# Patient Record
Sex: MALE | Race: WHITE | NOT HISPANIC OR LATINO | Employment: OTHER | URBAN - METROPOLITAN AREA
[De-identification: names, ages, dates, MRNs, and addresses within clinical notes are randomized per-mention and may not be internally consistent; named-entity substitution may affect disease eponyms.]

---

## 2023-02-20 LAB
ALANINE AMINOTRANSFERASE (SGPT) (U/L) IN SER/PLAS: 11 U/L (ref 10–52)
ALBUMIN (G/DL) IN SER/PLAS: 4.4 G/DL (ref 3.4–5)
ALKALINE PHOSPHATASE (U/L) IN SER/PLAS: 91 U/L (ref 33–136)
ANION GAP IN SER/PLAS: 15 MMOL/L (ref 10–20)
ASPARTATE AMINOTRANSFERASE (SGOT) (U/L) IN SER/PLAS: 14 U/L (ref 9–39)
BILIRUBIN TOTAL (MG/DL) IN SER/PLAS: 0.6 MG/DL (ref 0–1.2)
CALCIUM (MG/DL) IN SER/PLAS: 9.6 MG/DL (ref 8.6–10.3)
CARBON DIOXIDE, TOTAL (MMOL/L) IN SER/PLAS: 30 MMOL/L (ref 21–32)
CHLORIDE (MMOL/L) IN SER/PLAS: 100 MMOL/L (ref 98–107)
CHOLESTEROL (MG/DL) IN SER/PLAS: 192 MG/DL (ref 0–199)
CHOLESTEROL IN HDL (MG/DL) IN SER/PLAS: 60.2 MG/DL
CHOLESTEROL/HDL RATIO: 3.2
CREATININE (MG/DL) IN SER/PLAS: 0.94 MG/DL (ref 0.5–1.3)
ERYTHROCYTE DISTRIBUTION WIDTH (RATIO) BY AUTOMATED COUNT: 14 % (ref 11.5–14.5)
ERYTHROCYTE MEAN CORPUSCULAR HEMOGLOBIN CONCENTRATION (G/DL) BY AUTOMATED: 31.8 G/DL (ref 32–36)
ERYTHROCYTE MEAN CORPUSCULAR VOLUME (FL) BY AUTOMATED COUNT: 94 FL (ref 80–100)
ERYTHROCYTES (10*6/UL) IN BLOOD BY AUTOMATED COUNT: 4.5 X10E12/L (ref 4.5–5.9)
GFR MALE: 82 ML/MIN/1.73M2
GLUCOSE (MG/DL) IN SER/PLAS: 100 MG/DL (ref 74–99)
HEMATOCRIT (%) IN BLOOD BY AUTOMATED COUNT: 42.1 % (ref 41–52)
HEMOGLOBIN (G/DL) IN BLOOD: 13.4 G/DL (ref 13.5–17.5)
LDL: 110 MG/DL (ref 0–99)
LEUKOCYTES (10*3/UL) IN BLOOD BY AUTOMATED COUNT: 6.2 X10E9/L (ref 4.4–11.3)
PLATELETS (10*3/UL) IN BLOOD AUTOMATED COUNT: 244 X10E9/L (ref 150–450)
POTASSIUM (MMOL/L) IN SER/PLAS: 3.8 MMOL/L (ref 3.5–5.3)
PROSTATE SPECIFIC AG (NG/ML) IN SER/PLAS: 0.06 NG/ML (ref 0–4)
PROTEIN TOTAL: 7.5 G/DL (ref 6.4–8.2)
SODIUM (MMOL/L) IN SER/PLAS: 141 MMOL/L (ref 136–145)
TRIGLYCERIDE (MG/DL) IN SER/PLAS: 107 MG/DL (ref 0–149)
UREA NITROGEN (MG/DL) IN SER/PLAS: 14 MG/DL (ref 6–23)
VLDL: 21 MG/DL (ref 0–40)

## 2023-02-21 LAB
ESTIMATED AVERAGE GLUCOSE FOR HBA1C: 114 MG/DL
HEMOGLOBIN A1C/HEMOGLOBIN TOTAL IN BLOOD: 5.6 %

## 2023-03-29 LAB
ALBUMIN (G/DL) IN SER/PLAS: 3.8 G/DL (ref 3.4–5)
ANION GAP IN SER/PLAS: 9 MMOL/L (ref 10–20)
CALCIUM (MG/DL) IN SER/PLAS: 9 MG/DL (ref 8.6–10.3)
CARBON DIOXIDE, TOTAL (MMOL/L) IN SER/PLAS: 31 MMOL/L (ref 21–32)
CHLORIDE (MMOL/L) IN SER/PLAS: 106 MMOL/L (ref 98–107)
CREATININE (MG/DL) IN SER/PLAS: 1.06 MG/DL (ref 0.5–1.3)
GFR MALE: 71 ML/MIN/1.73M2
GLUCOSE (MG/DL) IN SER/PLAS: 85 MG/DL (ref 74–99)
PHOSPHATE (MG/DL) IN SER/PLAS: 3.8 MG/DL (ref 2.5–4.9)
POTASSIUM (MMOL/L) IN SER/PLAS: 4.3 MMOL/L (ref 3.5–5.3)
SODIUM (MMOL/L) IN SER/PLAS: 142 MMOL/L (ref 136–145)
UREA NITROGEN (MG/DL) IN SER/PLAS: 15 MG/DL (ref 6–23)

## 2023-04-12 ENCOUNTER — TELEPHONE (OUTPATIENT)
Dept: PRIMARY CARE | Facility: CLINIC | Age: 78
End: 2023-04-12
Payer: MEDICARE

## 2023-04-12 PROBLEM — R91.1 LUNG NODULE SEEN ON IMAGING STUDY: Status: ACTIVE | Noted: 2023-04-12

## 2023-04-12 PROBLEM — J44.9 COLD (CHRONIC OBSTRUCTIVE LUNG DISEASE) (MULTI): Status: ACTIVE | Noted: 2023-04-12

## 2023-04-12 PROBLEM — E11.9 NEW ONSET TYPE 2 DIABETES MELLITUS (MULTI): Status: ACTIVE | Noted: 2023-04-12

## 2023-04-12 PROBLEM — N40.0 BPH (BENIGN PROSTATIC HYPERPLASIA): Status: ACTIVE | Noted: 2023-04-12

## 2023-04-12 PROBLEM — E78.5 HYPERLIPIDEMIA: Status: ACTIVE | Noted: 2023-04-12

## 2023-04-12 PROBLEM — C61 PROSTATE CANCER (MULTI): Status: ACTIVE | Noted: 2023-04-12

## 2023-04-12 PROBLEM — I65.23 ATHEROSCLEROSIS OF BOTH CAROTID ARTERIES: Status: ACTIVE | Noted: 2023-04-12

## 2023-04-12 PROBLEM — I63.9 CEREBROVASCULAR ACCIDENT (CVA) (MULTI): Status: ACTIVE | Noted: 2023-04-12

## 2023-04-12 PROBLEM — R09.89 BRUIT OF RIGHT CAROTID ARTERY: Status: ACTIVE | Noted: 2023-04-12

## 2023-04-12 RX ORDER — MULTIVITAMIN
1 TABLET ORAL DAILY
COMMUNITY

## 2023-04-12 RX ORDER — ATORVASTATIN CALCIUM 20 MG/1
1 TABLET, FILM COATED ORAL DAILY
COMMUNITY
End: 2023-09-21 | Stop reason: SDUPTHER

## 2023-04-12 RX ORDER — ATORVASTATIN CALCIUM 10 MG/1
1 TABLET, FILM COATED ORAL DAILY
COMMUNITY
Start: 2021-04-13

## 2023-04-12 RX ORDER — FLUTICASONE PROPIONATE AND SALMETEROL 250; 50 UG/1; UG/1
POWDER RESPIRATORY (INHALATION)
COMMUNITY
Start: 2022-11-07 | End: 2023-09-21 | Stop reason: ALTCHOICE

## 2023-04-12 RX ORDER — FLUTICASONE FUROATE, UMECLIDINIUM BROMIDE AND VILANTEROL TRIFENATATE 100; 62.5; 25 UG/1; UG/1; UG/1
POWDER RESPIRATORY (INHALATION)
COMMUNITY
Start: 2023-02-20

## 2023-04-12 RX ORDER — CLOPIDOGREL BISULFATE 75 MG/1
1 TABLET ORAL DAILY
COMMUNITY
End: 2023-08-28 | Stop reason: SDUPTHER

## 2023-04-12 RX ORDER — ALBUTEROL SULFATE 0.83 MG/ML
3 SOLUTION RESPIRATORY (INHALATION) EVERY 6 HOURS
COMMUNITY

## 2023-04-12 RX ORDER — LISINOPRIL 40 MG/1
1 TABLET ORAL DAILY
COMMUNITY
Start: 2023-03-29

## 2023-04-12 RX ORDER — OXYBUTYNIN CHLORIDE 5 MG/1
TABLET ORAL 2 TIMES DAILY
COMMUNITY
End: 2023-09-21 | Stop reason: ALTCHOICE

## 2023-04-12 RX ORDER — PREDNISONE 20 MG/1
2 TABLET ORAL DAILY
COMMUNITY
Start: 2022-11-16 | End: 2023-09-21 | Stop reason: ALTCHOICE

## 2023-04-12 RX ORDER — ALBUTEROL SULFATE 90 UG/1
AEROSOL, METERED RESPIRATORY (INHALATION) EVERY 6 HOURS PRN
COMMUNITY
Start: 2022-11-22

## 2023-04-12 RX ORDER — ASPIRIN 81 MG/1
1 TABLET ORAL DAILY
COMMUNITY
Start: 2021-09-15

## 2023-04-12 RX ORDER — TIOTROPIUM BROMIDE INHALATION SPRAY 1.56 UG/1
SPRAY, METERED RESPIRATORY (INHALATION)
COMMUNITY
Start: 2021-01-13 | End: 2023-09-21 | Stop reason: ALTCHOICE

## 2023-04-12 NOTE — TELEPHONE ENCOUNTER
PATIENT STOPPED IN OFFICE AND DROPPED OFF PAPERWORK TO BE FILLED OUT TO HAVE HIS MAILBOX MOVED    PAPERWORK PLACED IN [facility] MAILBOX 04/12/2023

## 2023-04-19 ENCOUNTER — TELEPHONE (OUTPATIENT)
Dept: PRIMARY CARE | Facility: CLINIC | Age: 78
End: 2023-04-19
Payer: MEDICARE

## 2023-04-19 NOTE — TELEPHONE ENCOUNTER
PT STOPPED BY TO SEE IF PAPERWORK FOR MOVING HIS MAILBOX IS READY. I RELAYED THAT YOU WOULD FILL OUT WHEN YOU ARE ABLE. HE STATES THAT HE IS NOT ABLE TO CROSS THE ROAD TO GET HIS MAIL. I TOLD HIM WE WOULD CALL WHEN IT IS READY FOR HIM TO

## 2023-04-24 NOTE — TELEPHONE ENCOUNTER
"The form dropped off by Hai Laboy is from the post office and is signed by him (there are 2 of the same form, only one is signed by pt). It is in the pile in the back with the clip marked \"other\".  "

## 2023-04-28 ENCOUNTER — TELEPHONE (OUTPATIENT)
Dept: PRIMARY CARE | Facility: CLINIC | Age: 78
End: 2023-04-28
Payer: MEDICARE

## 2023-08-03 ENCOUNTER — PATIENT OUTREACH (OUTPATIENT)
Dept: CARE COORDINATION | Facility: CLINIC | Age: 78
End: 2023-08-03
Payer: MEDICARE

## 2023-08-03 ENCOUNTER — DOCUMENTATION (OUTPATIENT)
Dept: CARE COORDINATION | Facility: CLINIC | Age: 78
End: 2023-08-03
Payer: MEDICARE

## 2023-08-03 NOTE — PROGRESS NOTES
Discharge Facility: [facility]  Discharge Diagnosis: Fall, generalized weakness with impaired ambulation  Admission Date: 6/23/23  Discharge Date: 8/1/23    PCP Appointment Date: No appts, message to clinical pool  Specialist Appointment Date: n/a  Hospital Encounter and Summary: not available at this time  Unsuccessful attempts to reach patient x2

## 2023-08-15 ENCOUNTER — TELEPHONE (OUTPATIENT)
Dept: PRIMARY CARE | Facility: CLINIC | Age: 78
End: 2023-08-15
Payer: MEDICARE

## 2023-08-15 DIAGNOSIS — I63.9 CEREBROVASCULAR ACCIDENT (CVA), UNSPECIFIED MECHANISM (MULTI): Primary | ICD-10-CM

## 2023-08-15 NOTE — TELEPHONE ENCOUNTER
Pt just got out of a nursing home and was put on Clopidogrel 75mg. This is only showing as history for the clopidogrel, even in [facility]. Pt is requesting this to [facility] [place]. Pt is also requesting Gemtesa which was previously rx'd by Dr. Lopez in May. Pt is wanting Dr. MAIER to fill this as it would be more convenient for him.

## 2023-08-25 ENCOUNTER — PATIENT OUTREACH (OUTPATIENT)
Dept: CARE COORDINATION | Facility: CLINIC | Age: 78
End: 2023-08-25
Payer: MEDICARE

## 2023-08-25 NOTE — PROGRESS NOTES
Unable to reach patient for call back after patient's hospitalization. Patient did not have PCP appt within 14 days of discharge.  LVM with call back number for patient to call if needed   If no voicemail available call attempts x 2 were made to contact the patient to assist with any questions or concerns patient may have.

## 2023-08-28 RX ORDER — CLOPIDOGREL BISULFATE 75 MG/1
75 TABLET ORAL DAILY
Qty: 30 TABLET | Refills: 6 | Status: SHIPPED | OUTPATIENT
Start: 2023-08-28 | End: 2023-10-06

## 2023-09-21 ENCOUNTER — OFFICE VISIT (OUTPATIENT)
Dept: PRIMARY CARE | Facility: CLINIC | Age: 78
End: 2023-09-21
Payer: MEDICARE

## 2023-09-21 VITALS
OXYGEN SATURATION: 96 % | HEART RATE: 96 BPM | BODY MASS INDEX: 19.23 KG/M2 | RESPIRATION RATE: 16 BRPM | SYSTOLIC BLOOD PRESSURE: 138 MMHG | DIASTOLIC BLOOD PRESSURE: 84 MMHG | TEMPERATURE: 96.8 F | WEIGHT: 134 LBS

## 2023-09-21 DIAGNOSIS — E11.9 NEW ONSET TYPE 2 DIABETES MELLITUS (MULTI): ICD-10-CM

## 2023-09-21 DIAGNOSIS — I65.23 ATHEROSCLEROSIS OF BOTH CAROTID ARTERIES: ICD-10-CM

## 2023-09-21 DIAGNOSIS — R26.2 AMBULATORY DYSFUNCTION: ICD-10-CM

## 2023-09-21 DIAGNOSIS — E78.5 HYPERLIPIDEMIA, UNSPECIFIED HYPERLIPIDEMIA TYPE: ICD-10-CM

## 2023-09-21 DIAGNOSIS — I63.039 CEREBROVASCULAR ACCIDENT (CVA) DUE TO THROMBOSIS OF CAROTID ARTERY, UNSPECIFIED BLOOD VESSEL LATERALITY (MULTI): ICD-10-CM

## 2023-09-21 DIAGNOSIS — C61 PROSTATE CANCER (MULTI): ICD-10-CM

## 2023-09-21 DIAGNOSIS — Z00.00 ROUTINE GENERAL MEDICAL EXAMINATION AT HEALTH CARE FACILITY: Primary | ICD-10-CM

## 2023-09-21 DIAGNOSIS — J44.9 CHRONIC OBSTRUCTIVE PULMONARY DISEASE, UNSPECIFIED COPD TYPE (MULTI): ICD-10-CM

## 2023-09-21 DIAGNOSIS — R91.1 LUNG NODULE SEEN ON IMAGING STUDY: ICD-10-CM

## 2023-09-21 PROBLEM — R39.15 URINARY URGENCY: Status: ACTIVE | Noted: 2023-09-21

## 2023-09-21 PROCEDURE — 99215 OFFICE O/P EST HI 40 MIN: CPT | Performed by: INTERNAL MEDICINE

## 2023-09-21 PROCEDURE — 1126F AMNT PAIN NOTED NONE PRSNT: CPT | Performed by: INTERNAL MEDICINE

## 2023-09-21 PROCEDURE — 1036F TOBACCO NON-USER: CPT | Performed by: INTERNAL MEDICINE

## 2023-09-21 PROCEDURE — G0439 PPPS, SUBSEQ VISIT: HCPCS | Performed by: INTERNAL MEDICINE

## 2023-09-21 PROCEDURE — 1160F RVW MEDS BY RX/DR IN RCRD: CPT | Performed by: INTERNAL MEDICINE

## 2023-09-21 PROCEDURE — 1170F FXNL STATUS ASSESSED: CPT | Performed by: INTERNAL MEDICINE

## 2023-09-21 PROCEDURE — 1159F MED LIST DOCD IN RCRD: CPT | Performed by: INTERNAL MEDICINE

## 2023-09-21 RX ORDER — VIBEGRON 75 MG/1
1 TABLET, FILM COATED ORAL DAILY
Qty: 90 TABLET | Refills: 3 | Status: SHIPPED | OUTPATIENT
Start: 2023-09-21

## 2023-09-21 RX ORDER — VIBEGRON 75 MG/1
1 TABLET, FILM COATED ORAL DAILY
COMMUNITY
Start: 2023-05-30 | End: 2023-09-21 | Stop reason: SDUPTHER

## 2023-09-21 SDOH — ECONOMIC STABILITY: FOOD INSECURITY: WITHIN THE PAST 12 MONTHS, THE FOOD YOU BOUGHT JUST DIDN'T LAST AND YOU DIDN'T HAVE MONEY TO GET MORE.: NEVER TRUE

## 2023-09-21 SDOH — ECONOMIC STABILITY: FOOD INSECURITY: WITHIN THE PAST 12 MONTHS, YOU WORRIED THAT YOUR FOOD WOULD RUN OUT BEFORE YOU GOT MONEY TO BUY MORE.: NEVER TRUE

## 2023-09-21 ASSESSMENT — LIFESTYLE VARIABLES
SKIP TO QUESTIONS 9-10: 1
HOW MANY STANDARD DRINKS CONTAINING ALCOHOL DO YOU HAVE ON A TYPICAL DAY: PATIENT DOES NOT DRINK
HOW OFTEN DO YOU HAVE A DRINK CONTAINING ALCOHOL: NEVER
HOW OFTEN DO YOU HAVE SIX OR MORE DRINKS ON ONE OCCASION: NEVER
AUDIT-C TOTAL SCORE: 0

## 2023-09-21 ASSESSMENT — ACTIVITIES OF DAILY LIVING (ADL)
BATHING: NEEDS ASSISTANCE
MANAGING_FINANCES: NEEDS ASSISTANCE
GROCERY_SHOPPING: NEEDS ASSISTANCE
TAKING_MEDICATION: NEEDS ASSISTANCE
DOING_HOUSEWORK: NEEDS ASSISTANCE
DRESSING: NEEDS ASSISTANCE

## 2023-09-21 ASSESSMENT — PAIN SCALES - GENERAL: PAINLEVEL: 0-NO PAIN

## 2023-09-21 ASSESSMENT — PATIENT HEALTH QUESTIONNAIRE - PHQ9
1. LITTLE INTEREST OR PLEASURE IN DOING THINGS: NOT AT ALL
SUM OF ALL RESPONSES TO PHQ9 QUESTIONS 1 & 2: 0
2. FEELING DOWN, DEPRESSED OR HOPELESS: NOT AT ALL

## 2023-09-21 ASSESSMENT — ENCOUNTER SYMPTOMS
OCCASIONAL FEELINGS OF UNSTEADINESS: 1
DEPRESSION: 0
LOSS OF SENSATION IN FEET: 0

## 2023-09-21 NOTE — ASSESSMENT & PLAN NOTE
Patient has poor insight into illnesses, home health ordered, a face to face exam completed today, patient does not cook his own food, he is not able to dispense his own med therapy. The patient requires home health to perform activities of daily living.

## 2023-09-21 NOTE — PROGRESS NOTES
"Subjective   Reason for Visit: Hai Laboy is an 78 y.o. male here for a Medicare Wellness visit and follow up exam     Past Medical, Surgical, and Family History reviewed and updated in chart.    Reviewed all medications by prescribing practitioner or clinical pharmacist (such as prescriptions, OTCs, herbal therapies and supplements) and documented in the medical record.    HPI    Mechanical fall s/p hospital admission: He was discharged to a nursing facility. He has been home since August 1st.     CVA/Carotid stenosis: He is followed by Vascular Surgery, Dr. Sal. He is on ASA. No reports of declining mentation or neurologic symptoms. CTA of the neck showing \"Extensive atherosclerotic disease from the aortic arch to the carotid  siphons\", patient is not aware of follow up. He apparently does not have a follow up scheduled    COPD: He is currently on Trelegy-Ellipta and utilizes an Albuterol inhaler/nebulizer treatments per as needed. Trelegy was begun on prior visit. He feels that the therapy has helped. He is not followed by Pulmonology.     DM type-II: He is not currently on medical therapy. No recent use of insulin. He has not had recent labs completed    HLD: He is on Atorvastatin 10 mg daily. His meals are controlled by others including friends. He is unable to make his own meals. He is due for lab testing    Black stools: No recent reports or issues.    Urinary Urgency/History of Prostate CA: He is followed by Dr. Lopez. He is on Gemteza 75 mg. No urinary complaints.    Spiculated Lung Nodule: He has not yet followed up with Pulmonology. Previous history of work in [employer].    Visual disturbances: Cataract surgery. No new issues reported.      He awaiting for home health care, patient apparently had home health care after SNF admission for about 2 weeks. The nursing service could not get patient help. Patient currently has a friend helping him at home only    Patient Care Team:  Damian SAINI" MD Yoshi as PCP - General  Damian Belle MD as PCP - [employer] PCP     Review of Systems    Patient gets some shortness of breath with exertion. Otherwise he denies any further current complaints, except as  noted in the HPI    Objective   Vitals:  /84 (BP Location: Left arm, Patient Position: Sitting, BP Cuff Size: Adult)   Pulse 96   Temp 36 °C (96.8 °F) (Temporal)   Resp 16   Wt 60.8 kg (134 lb)   SpO2 96%   BMI 19.23 kg/m²       Physical Exam    Constitutional   General appearance: Abnormal.  well developed, well nourished, within normal limits of ideal weight , walks with a walker, patient seems to have limited insight of his illnesses. He is accompanied by friend who helps him  Eyes   Inspection of eyes: Sclera and conjunctiva were normal.   Ears, Nose, Mouth, and Throat   Ears: Auricles: Normal.   Pulmonary   Respiratory assessment: No respiratory distress, normal respiratory rhythm and effort.    Auscultation of lungs: Abnormal.  Auscultation of the lungs revealed decreased breath sounds diffusely, but no expiratory wheezing and no inspiratory wheezing. no rales or crackles were heard bilaterally. no rhonchi. no friction rub. no wheezing. markedly diminished breath sounds throughtout all fields. no bronchial breath sounds.   Cardiovascular   Auscultation of heart: Apical pulse normal, heart rate and rhythm normal, normal S1 and S2, no murmurs and no pericardial rub. The heart rate was normal. The rhythm was regular. Heart sounds: the heart sounds were distant, but normal S1 and normal S2. no murmurs were heard.    Exam for edema: No peripheral edema. bilateral  carotid bruits are noted.   Lymphatic   Palpation of lymph nodes in neck: No cervical lymphadenopathy.   Neurologic   Cranial nerves: Patient has a very slight left facial droop only,  not wearing a mask , he does not manage meds at home, he walks with a walker  Psychiatric   Orientation: Oriented to person,  place, and time.    Mood and affect: Normal. He seems to have poor insight into current multiple medical issues  Assessment/Plan   Problem List Items Addressed This Visit       Atherosclerosis of both carotid arteries    Current Assessment & Plan     Refer to vascular surgery for follow up          Relevant Orders    CBC and Auto Differential    Comprehensive metabolic panel    Referral to Vascular Surgery    Referral to Home Care    Cerebrovascular accident (CVA) (CMS/[facility])    Current Assessment & Plan     Patient has poor insight into illnesses, home health ordered, a face to face exam completed today, patient does not cook his own food, he is not able to dispense his own med therapy. The patient requires home health to perform activities of daily living.          Relevant Orders    CBC and Auto Differential    Comprehensive metabolic panel    Referral to Vascular Surgery    Referral to Home Care    COLD (chronic obstructive lung disease) (CMS/[facility])    Current Assessment & Plan     Continue the Trelegy, refer to pulmonary med for follow up         Relevant Orders    CBC and Auto Differential    Comprehensive metabolic panel    Referral to Pulmonology    Referral to Home Care    Hyperlipidemia    Current Assessment & Plan     Check lipids, continue the atorvastatin therapy         Relevant Orders    CBC and Auto Differential    Comprehensive metabolic panel    Lipid panel    Referral to Home Care    Lung nodule seen on imaging study    Current Assessment & Plan     Check a follow up CT of the chest         Relevant Orders    CBC and Auto Differential    Comprehensive metabolic panel    Referral to Pulmonology    CT chest wo IV contrast    Referral to Home Care    New onset type 2 diabetes mellitus (CMS/[facility])    Current Assessment & Plan     Check labs, patient takes no meds for DM at present time         Relevant Orders    CBC and Auto Differential    Comprehensive metabolic panel    Hemoglobin A1c    Albumin, urine,  random    Referral to Home Care    Prostate cancer (CMS/[facility])    Current Assessment & Plan     He should follow up regularly with urology         Relevant Medications    Gemtesa 75 mg tablet    Other Relevant Orders    CBC and Auto Differential    Comprehensive metabolic panel    Referral to Urology    Referral to Home Care    Routine general medical examination at health care facility - Primary    Relevant Orders    CBC and Auto Differential    Comprehensive metabolic panel    Referral to Home Care     Other Visit Diagnoses       Ambulatory dysfunction        Relevant Orders    Referral to Home Care          Patient refused all vaccines today, Medicare wellness exam was completed, patient was referred to home health care for assessment. He needs help with AOLs at home

## 2023-09-25 ENCOUNTER — TELEPHONE (OUTPATIENT)
Dept: PRIMARY CARE | Facility: CLINIC | Age: 78
End: 2023-09-25
Payer: MEDICARE

## 2023-09-25 NOTE — TELEPHONE ENCOUNTER
Bennett Gutierrez with  Homecare called stating they do not have a home health aid in his area.      They are able to provide a skilled nurse and a     Call back number [phone]

## 2023-10-01 DIAGNOSIS — I63.9 CEREBROVASCULAR ACCIDENT (CVA), UNSPECIFIED MECHANISM (MULTI): ICD-10-CM

## 2023-10-06 RX ORDER — CLOPIDOGREL BISULFATE 75 MG/1
75 TABLET ORAL DAILY
Qty: 30 TABLET | Refills: 6 | Status: SHIPPED | OUTPATIENT
Start: 2023-10-06

## 2023-10-16 ENCOUNTER — APPOINTMENT (OUTPATIENT)
Dept: RADIOLOGY | Facility: HOSPITAL | Age: 78
DRG: 871 | End: 2023-10-16
Payer: MEDICARE

## 2023-10-16 ENCOUNTER — HOSPITAL ENCOUNTER (INPATIENT)
Facility: HOSPITAL | Age: 78
LOS: 4 days | Discharge: NURSING, REHAB OR LONG-TERM CARE | DRG: 871 | End: 2023-10-20
Attending: EMERGENCY MEDICINE | Admitting: INTERNAL MEDICINE
Payer: MEDICARE

## 2023-10-16 DIAGNOSIS — R41.82 ALTERED MENTAL STATUS, UNSPECIFIED ALTERED MENTAL STATUS TYPE: ICD-10-CM

## 2023-10-16 DIAGNOSIS — I63.9 CEREBROVASCULAR ACCIDENT (CVA), UNSPECIFIED MECHANISM (MULTI): ICD-10-CM

## 2023-10-16 DIAGNOSIS — R93.0 ABNORMAL CT SCAN OF HEAD: ICD-10-CM

## 2023-10-16 DIAGNOSIS — J96.01 ACUTE HYPOXIC RESPIRATORY FAILURE (MULTI): ICD-10-CM

## 2023-10-16 DIAGNOSIS — G93.40 ENCEPHALOPATHY: ICD-10-CM

## 2023-10-16 DIAGNOSIS — G93.89 CEREBRAL VENTRICULOMEGALY: Primary | ICD-10-CM

## 2023-10-16 DIAGNOSIS — I65.23 ATHEROSCLEROSIS OF BOTH CAROTID ARTERIES: ICD-10-CM

## 2023-10-16 DIAGNOSIS — I65.01 OCCLUSION OF RIGHT VERTEBRAL ARTERY: ICD-10-CM

## 2023-10-16 DIAGNOSIS — J18.9 ACUTE PNEUMONIA: ICD-10-CM

## 2023-10-16 LAB
ALBUMIN SERPL BCP-MCNC: 4.6 G/DL (ref 3.4–5)
ALP SERPL-CCNC: 87 U/L (ref 33–136)
ALT SERPL W P-5'-P-CCNC: 6 U/L (ref 10–52)
ANION GAP BLDV CALCULATED.4IONS-SCNC: 12 MMOL/L (ref 10–25)
ANION GAP SERPL CALC-SCNC: 20 MMOL/L (ref 10–20)
AST SERPL W P-5'-P-CCNC: 21 U/L (ref 9–39)
BASE EXCESS BLDV CALC-SCNC: 1.4 MMOL/L (ref -2–3)
BASOPHILS # BLD AUTO: 0.07 X10*3/UL (ref 0–0.1)
BASOPHILS NFR BLD AUTO: 0.4 %
BILIRUB SERPL-MCNC: 0.9 MG/DL (ref 0–1.2)
BODY TEMPERATURE: 37 DEGREES CELSIUS
BUN SERPL-MCNC: 39 MG/DL (ref 6–23)
CA-I BLDV-SCNC: 1.16 MMOL/L (ref 1.1–1.33)
CALCIUM SERPL-MCNC: 10 MG/DL (ref 8.6–10.3)
CARDIAC TROPONIN I PNL SERPL HS: 21 NG/L (ref 0–20)
CHLORIDE BLDV-SCNC: 102 MMOL/L (ref 98–107)
CHLORIDE SERPL-SCNC: 103 MMOL/L (ref 98–107)
CO2 SERPL-SCNC: 20 MMOL/L (ref 21–32)
CREAT SERPL-MCNC: 1.29 MG/DL (ref 0.5–1.3)
D DIMER PPP FEU-MCNC: 1935 NG/ML FEU
EOSINOPHIL # BLD AUTO: 0.01 X10*3/UL (ref 0–0.4)
EOSINOPHIL NFR BLD AUTO: 0.1 %
ERYTHROCYTE [DISTWIDTH] IN BLOOD BY AUTOMATED COUNT: 14.3 % (ref 11.5–14.5)
FLUAV RNA RESP QL NAA+PROBE: NOT DETECTED
FLUBV RNA RESP QL NAA+PROBE: NOT DETECTED
GFR SERPL CREATININE-BSD FRML MDRD: 56 ML/MIN/1.73M*2
GLUCOSE BLDV-MCNC: 148 MG/DL (ref 74–99)
GLUCOSE SERPL-MCNC: 141 MG/DL (ref 74–99)
HCO3 BLDV-SCNC: 27.2 MMOL/L (ref 22–26)
HCT VFR BLD AUTO: 50.1 % (ref 41–52)
HCT VFR BLD EST: 47 % (ref 41–52)
HGB BLD-MCNC: 16.9 G/DL (ref 13.5–17.5)
HGB BLDV-MCNC: 15.5 G/DL (ref 13.5–17.5)
IMM GRANULOCYTES # BLD AUTO: 0.09 X10*3/UL (ref 0–0.5)
IMM GRANULOCYTES NFR BLD AUTO: 0.5 % (ref 0–0.9)
INHALED O2 CONCENTRATION: 37 %
LACTATE BLDV-SCNC: 2.4 MMOL/L (ref 0.4–2)
LACTATE SERPL-SCNC: 2.1 MMOL/L (ref 0.4–2)
LYMPHOCYTES # BLD AUTO: 0.79 X10*3/UL (ref 0.8–3)
LYMPHOCYTES NFR BLD AUTO: 4.8 %
MCH RBC QN AUTO: 30.7 PG (ref 26–34)
MCHC RBC AUTO-ENTMCNC: 33.7 G/DL (ref 32–36)
MCV RBC AUTO: 91 FL (ref 80–100)
MONOCYTES # BLD AUTO: 1.81 X10*3/UL (ref 0.05–0.8)
MONOCYTES NFR BLD AUTO: 11 %
NEUTROPHILS # BLD AUTO: 13.74 X10*3/UL (ref 1.6–5.5)
NEUTROPHILS NFR BLD AUTO: 83.2 %
NRBC BLD-RTO: 0 /100 WBCS (ref 0–0)
OXYHGB MFR BLDV: 53 % (ref 45–75)
PCO2 BLDV: 46 MM HG (ref 41–51)
PH BLDV: 7.38 PH (ref 7.33–7.43)
PLATELET # BLD AUTO: 275 X10*3/UL (ref 150–450)
PMV BLD AUTO: 10.7 FL (ref 7.5–11.5)
PO2 BLDV: 35 MM HG (ref 35–45)
POTASSIUM BLDV-SCNC: 4.5 MMOL/L (ref 3.5–5.3)
POTASSIUM SERPL-SCNC: 4.4 MMOL/L (ref 3.5–5.3)
POTASSIUM SERPL-SCNC: 5.7 MMOL/L (ref 3.5–5.3)
PROT SERPL-MCNC: 8.2 G/DL (ref 6.4–8.2)
RBC # BLD AUTO: 5.5 X10*6/UL (ref 4.5–5.9)
SAO2 % BLDV: 54 % (ref 45–75)
SARS-COV-2 RNA RESP QL NAA+PROBE: NOT DETECTED
SODIUM BLDV-SCNC: 137 MMOL/L (ref 136–145)
SODIUM SERPL-SCNC: 137 MMOL/L (ref 136–145)
WBC # BLD AUTO: 16.5 X10*3/UL (ref 4.4–11.3)

## 2023-10-16 PROCEDURE — 2500000004 HC RX 250 GENERAL PHARMACY W/ HCPCS (ALT 636 FOR OP/ED): Performed by: EMERGENCY MEDICINE

## 2023-10-16 PROCEDURE — 36415 COLL VENOUS BLD VENIPUNCTURE: CPT | Performed by: EMERGENCY MEDICINE

## 2023-10-16 PROCEDURE — 2500000004 HC RX 250 GENERAL PHARMACY W/ HCPCS (ALT 636 FOR OP/ED): Performed by: STUDENT IN AN ORGANIZED HEALTH CARE EDUCATION/TRAINING PROGRAM

## 2023-10-16 PROCEDURE — 71275 CT ANGIOGRAPHY CHEST: CPT | Performed by: RADIOLOGY

## 2023-10-16 PROCEDURE — 70450 CT HEAD/BRAIN W/O DYE: CPT | Mod: ME

## 2023-10-16 PROCEDURE — 83605 ASSAY OF LACTIC ACID: CPT | Performed by: EMERGENCY MEDICINE

## 2023-10-16 PROCEDURE — 80053 COMPREHEN METABOLIC PANEL: CPT | Performed by: EMERGENCY MEDICINE

## 2023-10-16 PROCEDURE — 2500000004 HC RX 250 GENERAL PHARMACY W/ HCPCS (ALT 636 FOR OP/ED)

## 2023-10-16 PROCEDURE — 71275 CT ANGIOGRAPHY CHEST: CPT | Mod: MG

## 2023-10-16 PROCEDURE — 2500000002 HC RX 250 W HCPCS SELF ADMINISTERED DRUGS (ALT 637 FOR MEDICARE OP, ALT 636 FOR OP/ED): Performed by: EMERGENCY MEDICINE

## 2023-10-16 PROCEDURE — 84132 ASSAY OF SERUM POTASSIUM: CPT | Performed by: EMERGENCY MEDICINE

## 2023-10-16 PROCEDURE — 70450 CT HEAD/BRAIN W/O DYE: CPT | Performed by: RADIOLOGY

## 2023-10-16 PROCEDURE — 85379 FIBRIN DEGRADATION QUANT: CPT | Performed by: EMERGENCY MEDICINE

## 2023-10-16 PROCEDURE — 1200000002 HC GENERAL ROOM WITH TELEMETRY DAILY

## 2023-10-16 PROCEDURE — 99285 EMERGENCY DEPT VISIT HI MDM: CPT | Performed by: EMERGENCY MEDICINE

## 2023-10-16 PROCEDURE — 99223 1ST HOSP IP/OBS HIGH 75: CPT | Performed by: PHYSICIAN ASSISTANT

## 2023-10-16 PROCEDURE — 2550000001 HC RX 255 CONTRASTS: Performed by: STUDENT IN AN ORGANIZED HEALTH CARE EDUCATION/TRAINING PROGRAM

## 2023-10-16 PROCEDURE — 87636 SARSCOV2 & INF A&B AMP PRB: CPT | Performed by: EMERGENCY MEDICINE

## 2023-10-16 PROCEDURE — 71045 X-RAY EXAM CHEST 1 VIEW: CPT | Mod: FY

## 2023-10-16 PROCEDURE — 87040 BLOOD CULTURE FOR BACTERIA: CPT | Mod: CMCLAB,PORLAB | Performed by: EMERGENCY MEDICINE

## 2023-10-16 PROCEDURE — 36415 COLL VENOUS BLD VENIPUNCTURE: CPT | Performed by: PHYSICIAN ASSISTANT

## 2023-10-16 PROCEDURE — 96375 TX/PRO/DX INJ NEW DRUG ADDON: CPT

## 2023-10-16 PROCEDURE — 85025 COMPLETE CBC W/AUTO DIFF WBC: CPT | Performed by: EMERGENCY MEDICINE

## 2023-10-16 PROCEDURE — 82805 BLOOD GASES W/O2 SATURATION: CPT | Performed by: EMERGENCY MEDICINE

## 2023-10-16 PROCEDURE — 96365 THER/PROPH/DIAG IV INF INIT: CPT

## 2023-10-16 PROCEDURE — 84484 ASSAY OF TROPONIN QUANT: CPT | Performed by: EMERGENCY MEDICINE

## 2023-10-16 PROCEDURE — 71045 X-RAY EXAM CHEST 1 VIEW: CPT | Performed by: RADIOLOGY

## 2023-10-16 RX ORDER — ACETAMINOPHEN 160 MG/5ML
650 SOLUTION ORAL EVERY 4 HOURS PRN
Status: DISCONTINUED | OUTPATIENT
Start: 2023-10-16 | End: 2023-10-20 | Stop reason: HOSPADM

## 2023-10-16 RX ORDER — ACETAMINOPHEN 325 MG/1
650 TABLET ORAL EVERY 4 HOURS PRN
Status: DISCONTINUED | OUTPATIENT
Start: 2023-10-16 | End: 2023-10-20 | Stop reason: HOSPADM

## 2023-10-16 RX ORDER — IPRATROPIUM BROMIDE AND ALBUTEROL SULFATE 2.5; .5 MG/3ML; MG/3ML
9 SOLUTION RESPIRATORY (INHALATION) ONCE
Status: COMPLETED | OUTPATIENT
Start: 2023-10-16 | End: 2023-10-16

## 2023-10-16 RX ORDER — PANTOPRAZOLE SODIUM 40 MG/1
40 TABLET, DELAYED RELEASE ORAL
Status: DISCONTINUED | OUTPATIENT
Start: 2023-10-17 | End: 2023-10-20 | Stop reason: HOSPADM

## 2023-10-16 RX ORDER — CEFTRIAXONE 1 G/50ML
1 INJECTION, SOLUTION INTRAVENOUS ONCE
Status: COMPLETED | OUTPATIENT
Start: 2023-10-16 | End: 2023-10-16

## 2023-10-16 RX ORDER — ATORVASTATIN CALCIUM 10 MG/1
10 TABLET, FILM COATED ORAL DAILY
Status: DISCONTINUED | OUTPATIENT
Start: 2023-10-17 | End: 2023-10-20 | Stop reason: HOSPADM

## 2023-10-16 RX ORDER — TALC
3 POWDER (GRAM) TOPICAL NIGHTLY PRN
Status: DISCONTINUED | OUTPATIENT
Start: 2023-10-16 | End: 2023-10-20 | Stop reason: HOSPADM

## 2023-10-16 RX ORDER — CLOPIDOGREL BISULFATE 75 MG/1
75 TABLET ORAL DAILY
Status: DISCONTINUED | OUTPATIENT
Start: 2023-10-17 | End: 2023-10-20 | Stop reason: HOSPADM

## 2023-10-16 RX ORDER — CEFTRIAXONE 1 G/50ML
INJECTION, SOLUTION INTRAVENOUS
Status: COMPLETED
Start: 2023-10-16 | End: 2023-10-16

## 2023-10-16 RX ORDER — ACETAMINOPHEN 650 MG/1
650 SUPPOSITORY RECTAL EVERY 4 HOURS PRN
Status: DISCONTINUED | OUTPATIENT
Start: 2023-10-16 | End: 2023-10-20 | Stop reason: HOSPADM

## 2023-10-16 RX ORDER — FLUTICASONE FUROATE AND VILANTEROL 100; 25 UG/1; UG/1
1 POWDER RESPIRATORY (INHALATION)
Status: DISCONTINUED | OUTPATIENT
Start: 2023-10-17 | End: 2023-10-20 | Stop reason: HOSPADM

## 2023-10-16 RX ORDER — ASPIRIN 81 MG/1
81 TABLET ORAL DAILY
Status: DISCONTINUED | OUTPATIENT
Start: 2023-10-17 | End: 2023-10-20 | Stop reason: HOSPADM

## 2023-10-16 RX ORDER — PANTOPRAZOLE SODIUM 40 MG/10ML
40 INJECTION, POWDER, LYOPHILIZED, FOR SOLUTION INTRAVENOUS
Status: DISCONTINUED | OUTPATIENT
Start: 2023-10-17 | End: 2023-10-20 | Stop reason: HOSPADM

## 2023-10-16 RX ORDER — POLYETHYLENE GLYCOL 3350 17 G/17G
17 POWDER, FOR SOLUTION ORAL DAILY
Status: DISCONTINUED | OUTPATIENT
Start: 2023-10-17 | End: 2023-10-20 | Stop reason: HOSPADM

## 2023-10-16 RX ORDER — HEPARIN SODIUM 5000 [USP'U]/ML
5000 INJECTION, SOLUTION INTRAVENOUS; SUBCUTANEOUS EVERY 8 HOURS
Status: DISCONTINUED | OUTPATIENT
Start: 2023-10-16 | End: 2023-10-20 | Stop reason: HOSPADM

## 2023-10-16 RX ORDER — LISINOPRIL 20 MG/1
40 TABLET ORAL DAILY
Status: DISCONTINUED | OUTPATIENT
Start: 2023-10-17 | End: 2023-10-20 | Stop reason: HOSPADM

## 2023-10-16 RX ADMIN — CEFTRIAXONE SODIUM 1 G: 1 INJECTION, SOLUTION INTRAVENOUS at 21:13

## 2023-10-16 RX ADMIN — CEFTRIAXONE 1 G: 1 INJECTION, SOLUTION INTRAVENOUS at 21:13

## 2023-10-16 RX ADMIN — IOHEXOL 75 ML: 350 INJECTION, SOLUTION INTRAVENOUS at 20:05

## 2023-10-16 RX ADMIN — IPRATROPIUM BROMIDE AND ALBUTEROL SULFATE 9 ML: 2.5; .5 SOLUTION RESPIRATORY (INHALATION) at 17:22

## 2023-10-16 RX ADMIN — METHYLPREDNISOLONE SODIUM SUCCINATE 125 MG: 125 INJECTION, POWDER, FOR SOLUTION INTRAMUSCULAR; INTRAVENOUS at 17:20

## 2023-10-16 RX ADMIN — AZITHROMYCIN MONOHYDRATE 500 MG: 500 INJECTION, POWDER, LYOPHILIZED, FOR SOLUTION INTRAVENOUS at 21:54

## 2023-10-16 SDOH — SOCIAL STABILITY: SOCIAL INSECURITY: ARE YOU OR HAVE YOU BEEN THREATENED OR ABUSED PHYSICALLY, EMOTIONALLY, OR SEXUALLY BY ANYONE?: NO

## 2023-10-16 SDOH — SOCIAL STABILITY: SOCIAL INSECURITY: DO YOU FEEL UNSAFE GOING BACK TO THE PLACE WHERE YOU ARE LIVING?: NO

## 2023-10-16 SDOH — SOCIAL STABILITY: SOCIAL INSECURITY: HAVE YOU HAD THOUGHTS OF HARMING ANYONE ELSE?: NO

## 2023-10-16 SDOH — SOCIAL STABILITY: SOCIAL INSECURITY: HAS ANYONE EVER THREATENED TO HURT YOUR FAMILY OR YOUR PETS?: NO

## 2023-10-16 SDOH — SOCIAL STABILITY: SOCIAL INSECURITY: DO YOU FEEL ANYONE HAS EXPLOITED OR TAKEN ADVANTAGE OF YOU FINANCIALLY OR OF YOUR PERSONAL PROPERTY?: NO

## 2023-10-16 SDOH — SOCIAL STABILITY: SOCIAL INSECURITY: DOES ANYONE TRY TO KEEP YOU FROM HAVING/CONTACTING OTHER FRIENDS OR DOING THINGS OUTSIDE YOUR HOME?: NO

## 2023-10-16 SDOH — SOCIAL STABILITY: SOCIAL INSECURITY: ARE THERE ANY APPARENT SIGNS OF INJURIES/BEHAVIORS THAT COULD BE RELATED TO ABUSE/NEGLECT?: NO

## 2023-10-16 SDOH — SOCIAL STABILITY: SOCIAL INSECURITY: ABUSE: ADULT

## 2023-10-16 ASSESSMENT — COGNITIVE AND FUNCTIONAL STATUS - GENERAL
HELP NEEDED FOR BATHING: TOTAL
MOBILITY SCORE: 8
DRESSING REGULAR UPPER BODY CLOTHING: TOTAL
TOILETING: TOTAL
TURNING FROM BACK TO SIDE WHILE IN FLAT BAD: A LOT
WALKING IN HOSPITAL ROOM: TOTAL
EATING MEALS: A LITTLE
PERSONAL GROOMING: A LOT
MOVING FROM LYING ON BACK TO SITTING ON SIDE OF FLAT BED WITH BEDRAILS: A LOT
DRESSING REGULAR LOWER BODY CLOTHING: TOTAL
DAILY ACTIVITIY SCORE: 9
STANDING UP FROM CHAIR USING ARMS: TOTAL
MOVING TO AND FROM BED TO CHAIR: TOTAL
CLIMB 3 TO 5 STEPS WITH RAILING: TOTAL
PATIENT BASELINE BEDBOUND: UNABLE TO ASSESS AT THIS TIME

## 2023-10-16 ASSESSMENT — LIFESTYLE VARIABLES
HOW OFTEN DO YOU HAVE 6 OR MORE DRINKS ON ONE OCCASION: PATIENT DECLINED
EVER HAD A DRINK FIRST THING IN THE MORNING TO STEADY YOUR NERVES TO GET RID OF A HANGOVER: NO
HAVE YOU EVER FELT YOU SHOULD CUT DOWN ON YOUR DRINKING: NO
HOW MANY STANDARD DRINKS CONTAINING ALCOHOL DO YOU HAVE ON A TYPICAL DAY: PATIENT DECLINED
HOW OFTEN DO YOU HAVE A DRINK CONTAINING ALCOHOL: 4 OR MORE TIMES A WEEK
AUDIT-C TOTAL SCORE: -1
AUDIT-C TOTAL SCORE: -1
HAVE PEOPLE ANNOYED YOU BY CRITICIZING YOUR DRINKING: NO
SKIP TO QUESTIONS 9-10: 0
EVER FELT BAD OR GUILTY ABOUT YOUR DRINKING: NO

## 2023-10-16 ASSESSMENT — PATIENT HEALTH QUESTIONNAIRE - PHQ9
SUM OF ALL RESPONSES TO PHQ9 QUESTIONS 1 & 2: 0
SUM OF ALL RESPONSES TO PHQ9 QUESTIONS 1 & 2: 0
1. LITTLE INTEREST OR PLEASURE IN DOING THINGS: NOT AT ALL
1. LITTLE INTEREST OR PLEASURE IN DOING THINGS: NOT AT ALL
2. FEELING DOWN, DEPRESSED OR HOPELESS: NOT AT ALL
2. FEELING DOWN, DEPRESSED OR HOPELESS: NOT AT ALL

## 2023-10-16 ASSESSMENT — ACTIVITIES OF DAILY LIVING (ADL)
TOILETING: NEEDS ASSISTANCE
WALKS IN HOME: NEEDS ASSISTANCE
BATHING: NEEDS ASSISTANCE
HEARING - RIGHT EAR: FUNCTIONAL
LACK_OF_TRANSPORTATION: PATIENT DECLINED
ADEQUATE_TO_COMPLETE_ADL: UNABLE TO ASSESS
GROOMING: NEEDS ASSISTANCE
PATIENT'S MEMORY ADEQUATE TO SAFELY COMPLETE DAILY ACTIVITIES?: NO
ASSISTIVE_DEVICE: WALKER
HEARING - LEFT EAR: FUNCTIONAL
DRESSING YOURSELF: NEEDS ASSISTANCE
FEEDING YOURSELF: UNABLE TO ASSESS
JUDGMENT_ADEQUATE_SAFELY_COMPLETE_DAILY_ACTIVITIES: YES

## 2023-10-16 ASSESSMENT — PAIN - FUNCTIONAL ASSESSMENT: PAIN_FUNCTIONAL_ASSESSMENT: 0-10

## 2023-10-16 ASSESSMENT — COLUMBIA-SUICIDE SEVERITY RATING SCALE - C-SSRS
1. IN THE PAST MONTH, HAVE YOU WISHED YOU WERE DEAD OR WISHED YOU COULD GO TO SLEEP AND NOT WAKE UP?: NO
6. HAVE YOU EVER DONE ANYTHING, STARTED TO DO ANYTHING, OR PREPARED TO DO ANYTHING TO END YOUR LIFE?: NO
2. HAVE YOU ACTUALLY HAD ANY THOUGHTS OF KILLING YOURSELF?: NO

## 2023-10-16 ASSESSMENT — PAIN SCALES - GENERAL: PAINLEVEL_OUTOF10: 0 - NO PAIN

## 2023-10-16 NOTE — ED PROVIDER NOTES
HPI   Chief Complaint   Patient presents with   • Weakness, Gen     Inability to ambulate yesterday and today. Strong urine smell. Has been incontinent since last week and involuntary of bowels since last week. Both new to him.        HISTORY OF PRESENT ILLNESS:  Patient is a 78-year-old male who presents to the emergency department for generalized weakness.  EMS stated that they were called for initial generalized weakness, and possible urinary tract infection.  They found the patient to his not supposed to be on baseline oxygen confused disoriented.  Patient was brought in for further evaluation.  He currently denies being in pain.  He does appear confused, believing that he needed a blood transfusion.  He does not state that he feels short of breath.  He is currently on 3 L nasal cannula oxygen.    Past Medical History: Limited due to patient confusion and lack of family members.  Per EMR CVA, chronic obstructive lung disease, hyperlipidemia, lung nodule, diabetes, prostate cancer  Past Surgical History: Limited due to patient confusion and lack of family members.  Family History:  Limited due to patient confusion and lack of family members.  Social History:  Limited due to patient confusion and lack of family members.    __________________________________________________________  PHYSICAL EXAM:    Appearance:  male, chronically ill-appearing, alert and oriented to name only, cooperative   Skin: Intact,  dry skin, no lesions, rash, petechiae or purpura.   Eyes: PERRLA, EOMs intact,  Conjunctiva pink with no redness or exudates.    HENT: Normocephalic, atraumatic. Nares patent   Neck: Supple. Trachea at midline.   Pulmonary: Hypoxic requiring 3 L nasal cannula oxygen, tachypneic, diminished breath sounds,   Cardiac: Tachycardic, 106 regular rate and rhythm, no rubs, murmurs, or gallops. No JVD,   Abdomen: Abdomen is soft, nontender, and nondistended.  No palpable organomegaly.  No rebound or guarding.  No  CVA tenderness. Nonsurgical abdomen  Genitourinary: Exam deferred.  Musculoskeletal: no edema, pain, cyanosis, or deformity in extremities. Pulses full and equal.   Neurological:  Cranial nerves are grossly intact, grossly normal sensation, no weakness, no focal findings identified.    __________________________________________________________  MEDICAL DECISION MAKING:    Patient was seen and examined.  Unfortunately, its not totally clear who called EMS, given that he is presenting from home.  The patient himself is confused.  The patient is currently having a new hypoxia and is alert and oriented name only.  I did not identify any focal neurologic deficits.  Differential diagnosis includes hypercapnic respiratory failure, pneumonia, urinary tract infection, intracranial bleed.  Patient also sounds like there is some potential diminished breath sounds and wheezing so I ordered DuoNebs and Solu-Medrol.  Cardiac work-up, D-dimer, venous panel was ordered.    Patient had a leukocytosis of 16.5.  This was at 1700 hrs. and I was concerned for sepsis at this time.  However, no source identified yet.  The patient was signed out to Dr LEENA Hodge     Chronic Medical Conditions Significantly Affecting Care: CVA, COPD, hyperlipidemia, lung nodule, diabetes, prostate cancer    External Records Reviewed: I reviewed recent and relevant outside records including: Patient progress note from September 21, 2023    [facility]  Emergency Medicine                          No data recorded                Patient History   Past Medical History:   Diagnosis Date   • Personal history of other infectious and parasitic diseases 11/26/2019    History of Clostridioides difficile colitis   • Personal history of other specified conditions 09/27/2019    History of urinary incontinence   • Personal history of other specified conditions 09/27/2019    History of elevated prostate specific antigen (PSA)     Past Surgical History:   Procedure  Laterality Date   • CT NECK ANGIO W AND WO IV CONTRAST  4/14/2023    CT NECK ANGIO W AND WO IV CONTRAST POR CT   • OTHER SURGICAL HISTORY  04/27/2018    Wrist Surgery     Family History   Problem Relation Name Age of Onset   • No Known Problems Mother     • No Known Problems Father       Social History     Tobacco Use   • Smoking status: Former     Types: Cigarettes   • Smokeless tobacco: Never   Vaping Use   • Vaping Use: Never used   Substance Use Topics   • Alcohol use: Never   • Drug use: Never       Physical Exam   ED Triage Vitals [10/16/23 1543]   Temp Heart Rate Resp BP   36.7 °C (98.1 °F) 102 18 (!) 136/91      SpO2 Temp Source Heart Rate Source Patient Position   94 % Temporal -- Sitting      BP Location FiO2 (%)     Left arm --       Physical Exam    ED Course & MDM   ED Course as of 10/16/23 1715   Mon Oct 16, 2023   1700 WBC(!): 16.5  Concern for sepsis at this time [WJ]   1714 Patient twelve-lead EKG inter by myself shows sinus tachycardia with a ventricular rate of 107, normal NH interval, normal axis, normal QRS duration, normal QT, no STEMI.  Interpretation is limited due to artifact back. [WJ]      ED Course User Index  [WJ] Srinath Kelly, DO       Medical Decision Making      Procedure  Procedures     Srinath Kelly, DO  10/16/23 1714       Srinath Kelly, DO  10/16/23 1715

## 2023-10-17 PROBLEM — A41.9 SEPSIS (MULTI): Status: ACTIVE | Noted: 2023-10-17

## 2023-10-17 PROBLEM — I65.01 OCCLUSION OF RIGHT VERTEBRAL ARTERY: Status: ACTIVE | Noted: 2023-10-17

## 2023-10-17 PROBLEM — R09.89 BRUIT OF RIGHT CAROTID ARTERY: Status: RESOLVED | Noted: 2023-04-12 | Resolved: 2023-10-17

## 2023-10-17 PROBLEM — G93.40 ENCEPHALOPATHY: Status: ACTIVE | Noted: 2023-10-17

## 2023-10-17 PROBLEM — G93.89 CEREBRAL VENTRICULOMEGALY: Status: ACTIVE | Noted: 2023-10-17

## 2023-10-17 PROBLEM — R93.0 ABNORMAL CT SCAN OF HEAD: Status: RESOLVED | Noted: 2023-10-16 | Resolved: 2023-10-17

## 2023-10-17 LAB
ALBUMIN SERPL BCP-MCNC: 3.7 G/DL (ref 3.4–5)
ALP SERPL-CCNC: 69 U/L (ref 33–136)
ALT SERPL W P-5'-P-CCNC: 9 U/L (ref 10–52)
ANION GAP SERPL CALC-SCNC: 11 MMOL/L (ref 10–20)
APPEARANCE UR: ABNORMAL
AST SERPL W P-5'-P-CCNC: 12 U/L (ref 9–39)
BILIRUB SERPL-MCNC: 0.6 MG/DL (ref 0–1.2)
BILIRUB UR STRIP.AUTO-MCNC: NEGATIVE MG/DL
BUN SERPL-MCNC: 38 MG/DL (ref 6–23)
CALCIUM SERPL-MCNC: 9.3 MG/DL (ref 8.6–10.3)
CHLORIDE SERPL-SCNC: 105 MMOL/L (ref 98–107)
CO2 SERPL-SCNC: 28 MMOL/L (ref 21–32)
COLOR UR: YELLOW
CREAT SERPL-MCNC: 1.15 MG/DL (ref 0.5–1.3)
CRP SERPL-MCNC: 6.86 MG/DL
ERYTHROCYTE [DISTWIDTH] IN BLOOD BY AUTOMATED COUNT: 13.9 % (ref 11.5–14.5)
GFR SERPL CREATININE-BSD FRML MDRD: 64 ML/MIN/1.73M*2
GLUCOSE BLD MANUAL STRIP-MCNC: 123 MG/DL (ref 74–99)
GLUCOSE SERPL-MCNC: 173 MG/DL (ref 74–99)
GLUCOSE UR STRIP.AUTO-MCNC: NEGATIVE MG/DL
HBV SURFACE AG SERPL QL IA: NONREACTIVE
HCT VFR BLD AUTO: 39.4 % (ref 41–52)
HCV AB SER QL: NONREACTIVE
HGB BLD-MCNC: 13.6 G/DL (ref 13.5–17.5)
HIV 1+2 AB+HIV1P24 AG SERPLBLD IA.RAPID: NONREACTIVE
HOLD SPECIMEN: NORMAL
KETONES UR STRIP.AUTO-MCNC: ABNORMAL MG/DL
LACTATE BLDA-SCNC: 1.7 MMOL/L (ref 0.4–2)
LEUKOCYTE ESTERASE UR QL STRIP.AUTO: NEGATIVE
MCH RBC QN AUTO: 30.9 PG (ref 26–34)
MCHC RBC AUTO-ENTMCNC: 34.5 G/DL (ref 32–36)
MCV RBC AUTO: 90 FL (ref 80–100)
NITRITE UR QL STRIP.AUTO: NEGATIVE
NRBC BLD-RTO: 0 /100 WBCS (ref 0–0)
PH UR STRIP.AUTO: 6 [PH]
PLATELET # BLD AUTO: 238 X10*3/UL (ref 150–450)
PMV BLD AUTO: 10.3 FL (ref 7.5–11.5)
POTASSIUM SERPL-SCNC: 4 MMOL/L (ref 3.5–5.3)
PROCALCITONIN SERPL-MCNC: 0.1 NG/ML
PROT SERPL-MCNC: 6.4 G/DL (ref 6.4–8.2)
PROT UR STRIP.AUTO-MCNC: NEGATIVE MG/DL
RBC # BLD AUTO: 4.4 X10*6/UL (ref 4.5–5.9)
RBC # UR STRIP.AUTO: NEGATIVE /UL
SODIUM SERPL-SCNC: 140 MMOL/L (ref 136–145)
SP GR UR STRIP.AUTO: 1.06
UROBILINOGEN UR STRIP.AUTO-MCNC: <2 MG/DL
WBC # BLD AUTO: 9.9 X10*3/UL (ref 4.4–11.3)

## 2023-10-17 PROCEDURE — 86140 C-REACTIVE PROTEIN: CPT | Performed by: PHYSICIAN ASSISTANT

## 2023-10-17 PROCEDURE — 2500000002 HC RX 250 W HCPCS SELF ADMINISTERED DRUGS (ALT 637 FOR MEDICARE OP, ALT 636 FOR OP/ED): Performed by: INTERNAL MEDICINE

## 2023-10-17 PROCEDURE — 80053 COMPREHEN METABOLIC PANEL: CPT | Performed by: PHYSICIAN ASSISTANT

## 2023-10-17 PROCEDURE — 94640 AIRWAY INHALATION TREATMENT: CPT

## 2023-10-17 PROCEDURE — 82947 ASSAY GLUCOSE BLOOD QUANT: CPT

## 2023-10-17 PROCEDURE — 81003 URINALYSIS AUTO W/O SCOPE: CPT | Performed by: EMERGENCY MEDICINE

## 2023-10-17 PROCEDURE — 85027 COMPLETE CBC AUTOMATED: CPT | Performed by: PHYSICIAN ASSISTANT

## 2023-10-17 PROCEDURE — 2500000002 HC RX 250 W HCPCS SELF ADMINISTERED DRUGS (ALT 637 FOR MEDICARE OP, ALT 636 FOR OP/ED): Performed by: PHYSICIAN ASSISTANT

## 2023-10-17 PROCEDURE — 2500000004 HC RX 250 GENERAL PHARMACY W/ HCPCS (ALT 636 FOR OP/ED): Performed by: PHYSICIAN ASSISTANT

## 2023-10-17 PROCEDURE — 96372 THER/PROPH/DIAG INJ SC/IM: CPT | Performed by: PHYSICIAN ASSISTANT

## 2023-10-17 PROCEDURE — 83605 ASSAY OF LACTIC ACID: CPT | Performed by: INTERNAL MEDICINE

## 2023-10-17 PROCEDURE — 2500000004 HC RX 250 GENERAL PHARMACY W/ HCPCS (ALT 636 FOR OP/ED): Performed by: INTERNAL MEDICINE

## 2023-10-17 PROCEDURE — 86803 HEPATITIS C AB TEST: CPT | Mod: CMCLAB,PORLAB | Performed by: INTERNAL MEDICINE

## 2023-10-17 PROCEDURE — 87075 CULTR BACTERIA EXCEPT BLOOD: CPT | Mod: CMCLAB,PORLAB | Performed by: PHYSICIAN ASSISTANT

## 2023-10-17 PROCEDURE — 99233 SBSQ HOSP IP/OBS HIGH 50: CPT | Performed by: INTERNAL MEDICINE

## 2023-10-17 PROCEDURE — 36415 COLL VENOUS BLD VENIPUNCTURE: CPT | Performed by: PHYSICIAN ASSISTANT

## 2023-10-17 PROCEDURE — 87899 AGENT NOS ASSAY W/OPTIC: CPT | Mod: CMCLAB,PORLAB | Performed by: PHYSICIAN ASSISTANT

## 2023-10-17 PROCEDURE — 84145 PROCALCITONIN (PCT): CPT | Mod: CMCLAB,PORLAB | Performed by: PHYSICIAN ASSISTANT

## 2023-10-17 PROCEDURE — 99223 1ST HOSP IP/OBS HIGH 75: CPT | Performed by: PSYCHIATRY & NEUROLOGY

## 2023-10-17 PROCEDURE — C9113 INJ PANTOPRAZOLE SODIUM, VIA: HCPCS | Performed by: PHYSICIAN ASSISTANT

## 2023-10-17 PROCEDURE — 87340 HEPATITIS B SURFACE AG IA: CPT | Mod: CMCLAB,PORLAB | Performed by: INTERNAL MEDICINE

## 2023-10-17 PROCEDURE — 86703 HIV-1/HIV-2 1 RESULT ANTBDY: CPT | Performed by: INTERNAL MEDICINE

## 2023-10-17 PROCEDURE — 36600 WITHDRAWAL OF ARTERIAL BLOOD: CPT | Performed by: INTERNAL MEDICINE

## 2023-10-17 PROCEDURE — 36415 COLL VENOUS BLD VENIPUNCTURE: CPT | Performed by: INTERNAL MEDICINE

## 2023-10-17 PROCEDURE — 2500000001 HC RX 250 WO HCPCS SELF ADMINISTERED DRUGS (ALT 637 FOR MEDICARE OP): Performed by: PHYSICIAN ASSISTANT

## 2023-10-17 PROCEDURE — 1200000002 HC GENERAL ROOM WITH TELEMETRY DAILY

## 2023-10-17 RX ORDER — SODIUM CHLORIDE 0.9 % (FLUSH) 0.9 %
SYRINGE (ML) INJECTION
Status: COMPLETED
Start: 2023-10-17 | End: 2023-10-17

## 2023-10-17 RX ORDER — CEFTRIAXONE 2 G/50ML
2 INJECTION, SOLUTION INTRAVENOUS EVERY 24 HOURS
Status: DISCONTINUED | OUTPATIENT
Start: 2023-10-17 | End: 2023-10-20 | Stop reason: HOSPADM

## 2023-10-17 RX ORDER — AZITHROMYCIN 250 MG/1
500 TABLET, FILM COATED ORAL
Status: DISCONTINUED | OUTPATIENT
Start: 2023-10-17 | End: 2023-10-20 | Stop reason: HOSPADM

## 2023-10-17 RX ADMIN — TIOTROPIUM BROMIDE INHALATION SPRAY 2 PUFF: 3.12 SPRAY, METERED RESPIRATORY (INHALATION) at 09:23

## 2023-10-17 RX ADMIN — SODIUM CHLORIDE 500 ML: 9 INJECTION, SOLUTION INTRAVENOUS at 13:43

## 2023-10-17 RX ADMIN — ASPIRIN 81 MG: 81 TABLET, COATED ORAL at 09:22

## 2023-10-17 RX ADMIN — Medication 10 ML: at 06:20

## 2023-10-17 RX ADMIN — ATORVASTATIN CALCIUM 10 MG: 10 TABLET, FILM COATED ORAL at 20:45

## 2023-10-17 RX ADMIN — CEFTRIAXONE SODIUM 2 G: 2 INJECTION, SOLUTION INTRAVENOUS at 20:45

## 2023-10-17 RX ADMIN — PANTOPRAZOLE SODIUM 40 MG: 40 INJECTION, POWDER, FOR SOLUTION INTRAVENOUS at 06:15

## 2023-10-17 RX ADMIN — AZITHROMYCIN 500 MG: 250 TABLET, FILM COATED ORAL at 20:45

## 2023-10-17 RX ADMIN — FLUTICASONE FUROATE AND VILANTEROL TRIFENATATE 1 PUFF: 100; 25 POWDER RESPIRATORY (INHALATION) at 09:23

## 2023-10-17 RX ADMIN — LISINOPRIL 40 MG: 20 TABLET ORAL at 09:22

## 2023-10-17 RX ADMIN — HEPARIN SODIUM 5000 UNITS: 5000 INJECTION INTRAVENOUS; SUBCUTANEOUS at 06:15

## 2023-10-17 RX ADMIN — HEPARIN SODIUM 5000 UNITS: 5000 INJECTION INTRAVENOUS; SUBCUTANEOUS at 14:00

## 2023-10-17 RX ADMIN — HEPARIN SODIUM 5000 UNITS: 5000 INJECTION INTRAVENOUS; SUBCUTANEOUS at 22:18

## 2023-10-17 RX ADMIN — CLOPIDOGREL BISULFATE 75 MG: 75 TABLET ORAL at 09:22

## 2023-10-17 RX ADMIN — HYDROCORTISONE SODIUM SUCCINATE 50 MG: 100 INJECTION, POWDER, FOR SOLUTION INTRAMUSCULAR; INTRAVENOUS at 22:19

## 2023-10-17 ASSESSMENT — PAIN - FUNCTIONAL ASSESSMENT
PAIN_FUNCTIONAL_ASSESSMENT: 0-10
PAIN_FUNCTIONAL_ASSESSMENT: 0-10

## 2023-10-17 ASSESSMENT — COGNITIVE AND FUNCTIONAL STATUS - GENERAL
STANDING UP FROM CHAIR USING ARMS: TOTAL
CLIMB 3 TO 5 STEPS WITH RAILING: TOTAL
HELP NEEDED FOR BATHING: TOTAL
EATING MEALS: A LITTLE
TURNING FROM BACK TO SIDE WHILE IN FLAT BAD: A LOT
WALKING IN HOSPITAL ROOM: TOTAL
DRESSING REGULAR UPPER BODY CLOTHING: TOTAL
MOVING FROM LYING ON BACK TO SITTING ON SIDE OF FLAT BED WITH BEDRAILS: A LOT
MOVING TO AND FROM BED TO CHAIR: TOTAL
TOILETING: TOTAL
PERSONAL GROOMING: A LOT
DRESSING REGULAR LOWER BODY CLOTHING: TOTAL
MOBILITY SCORE: 8
DAILY ACTIVITIY SCORE: 9

## 2023-10-17 ASSESSMENT — PAIN SCALES - GENERAL
PAINLEVEL_OUTOF10: 0 - NO PAIN

## 2023-10-17 NOTE — H&P
History Of Present Illness  Hai Laboy is a 78 y.o. male with PMH of COPD with no baseline oxygen requirement, prior stroke, HLD, prostate cancer, who presents with altered mental status. Patient unable to provide history due to mental status, history obtained by ED provider and chart review. Per report EMS was called for patient due to weakness. Upon arrival they found him to be confused and hypoxic on RA. At this time patient is not waking to provide history, will wake briefly to state he does not know why he is here or where he is, but does not answer any specific questions. Further history unable to be obtained at this time.     ED course: Afebrile, , RR 18, /91, pulse ox 94% on room air. WBC 16.5, Hgb 16.9. D-dimer 1935. Chemistries remarkable for K 5.7 -> 4.4. Lactate 2.1. Troponin 21. CT head shows ventricular prominence may be NPH vs other hydrocephalus. CT angio chest shows no evidence of acute PE, possible pneumonia vs aspiration pneumonitis, emphysematous changes. Patient     ED Course as of 10/17/23 2005   Mon Oct 16, 2023   1700 WBC(!): 16.5  Concern for sepsis at this time [WJ]   1714 Patient twelve-lead EKG inter by myself shows sinus tachycardia with a ventricular rate of 107, normal DE interval, normal axis, normal QRS duration, normal QT, no STEMI.  Interpretation is limited due to artifact back. [WJ]   1828 XR chest 1 view  Calcified plaques, similar to prior [NS]   1829 CBC and Auto Differential(!)  Leukocytosis noted. Unsure whether related to infection or potential steroid use. Will look into [NS]   1829 Blood Gas Venous Full Panel(!)  Lactate elevated with normal PH [NS]   1829 Comprehensive Metabolic Panel(!)  Mild hyperkalemia noted, mild hemolysis. Will recheck [NS]   1830 D-dimer, VTE Exclusion(!)  D dimer elevated, will require CT [NS]   2104 CT angio chest for pulmonary embolism  CT without evidence of PE but shows concern for pna [NS]      ED Course User Index  [NS]  Partha Hodge MD  [WJ] Srinath Kelly,          Diagnoses as of 10/17/23 2005   Acute pneumonia        Past Medical History  He has a past medical history of Personal history of other infectious and parasitic diseases (11/26/2019), Personal history of other specified conditions (09/27/2019), and Personal history of other specified conditions (09/27/2019).    Surgical History  He has a past surgical history that includes Other surgical history (04/27/2018) and CT angio neck w and wo IV contrast (4/14/2023).     Social History  He reports that he has quit smoking. His smoking use included cigarettes. He has never used smokeless tobacco. He reports that he does not drink alcohol and does not use drugs.    Family History  Family History   Problem Relation Name Age of Onset    No Known Problems Mother      No Known Problems Father          Allergies  Patient has no known allergies.    Review of Systems  12 point ROS reviewed, negative except for as noted above    Last Recorded Vitals  /67 (BP Location: Right arm, Patient Position: Lying)   Pulse 66   Temp 37.2 °C (99 °F) (Temporal)   Resp 17   Wt 59 kg (130 lb 1.1 oz)   SpO2 97%       Physical Exam  Constitutional: Frail elderly male in no acute respiratory distress. Laying back in bed comfortably, drowsy, does not answer questions appropriately    Eyes: PERRL, EOMI    Head/Neck: Normocephalic, atraumatic. Neck supple, no thyromegaly, JVD. Trachea midline    Respiratory/Thorax: Normal respiratory effort. Lungs CTAB with no wheezing, rales, or rhonchi noted    Cardiovascular: RRR, no murmurs, rubs, or gallops noted. Peripheral pulses 2+    Gastrointestinal: Bowel sounds normal. Abdomen soft, nontender, nondistended, with no palpable masses    Musculoskeletal: No gross deformities. Does not follow commands but moves all 4 extremities spontaneously    Extremities: No lower extremity edema noted bilaterally    Neurological: Alert and oriented to self  only, CN II - VII grossly intact    Psychological: Lethargic, drowsy    Skin: No rashes or lesions noted.         Relevant Results  Results for orders placed or performed during the hospital encounter of 10/16/23 (from the past 24 hour(s))   Blood Culture    Specimen: Peripheral Venipuncture; Blood culture   Result Value Ref Range    Blood Culture Loaded on Instrument - Culture in progress    Rapid HIV   Result Value Ref Range    Rapid HIV Nonreactive Nonreactive   Hepatitis B Surface Antigen   Result Value Ref Range    Hepatitis B Surface AG Nonreactive Nonreactive   Hepatitis C Antibody   Result Value Ref Range    Hepatitis C AB Nonreactive Nonreactive   CBC   Result Value Ref Range    WBC 9.9 4.4 - 11.3 x10*3/uL    nRBC 0.0 0.0 - 0.0 /100 WBCs    RBC 4.40 (L) 4.50 - 5.90 x10*6/uL    Hemoglobin 13.6 13.5 - 17.5 g/dL    Hematocrit 39.4 (L) 41.0 - 52.0 %    MCV 90 80 - 100 fL    MCH 30.9 26.0 - 34.0 pg    MCHC 34.5 32.0 - 36.0 g/dL    RDW 13.9 11.5 - 14.5 %    Platelets 238 150 - 450 x10*3/uL    MPV 10.3 7.5 - 11.5 fL   Comprehensive metabolic panel   Result Value Ref Range    Glucose 173 (H) 74 - 99 mg/dL    Sodium 140 136 - 145 mmol/L    Potassium 4.0 3.5 - 5.3 mmol/L    Chloride 105 98 - 107 mmol/L    Bicarbonate 28 21 - 32 mmol/L    Anion Gap 11 10 - 20 mmol/L    Urea Nitrogen 38 (H) 6 - 23 mg/dL    Creatinine 1.15 0.50 - 1.30 mg/dL    eGFR 64 >60 mL/min/1.73m*2    Calcium 9.3 8.6 - 10.3 mg/dL    Albumin 3.7 3.4 - 5.0 g/dL    Alkaline Phosphatase 69 33 - 136 U/L    Total Protein 6.4 6.4 - 8.2 g/dL    AST 12 9 - 39 U/L    Bilirubin, Total 0.6 0.0 - 1.2 mg/dL    ALT 9 (L) 10 - 52 U/L   Procalcitonin   Result Value Ref Range    Procalcitonin 0.10 (H) <=0.07 ng/mL   C-Reactive Protein   Result Value Ref Range    C-Reactive Protein 6.86 (H) <1.00 mg/dL   Urinalysis with Reflex Microscopic and Culture   Result Value Ref Range    Color, Urine Yellow Straw, Yellow    Appearance, Urine Hazy (N) Clear    Specific [place],  Urine 1.057 (N) 1.005 - 1.035    pH, Urine 6.0 5.0, 5.5, 6.0, 6.5, 7.0, 7.5, 8.0    Protein, Urine NEGATIVE NEGATIVE mg/dL    Glucose, Urine NEGATIVE NEGATIVE mg/dL    Blood, Urine NEGATIVE NEGATIVE    Ketones, Urine 5 (TRACE) (A) NEGATIVE mg/dL    Bilirubin, Urine NEGATIVE NEGATIVE    Urobilinogen, Urine <2.0 <2.0 mg/dL    Nitrite, Urine NEGATIVE NEGATIVE    Leukocyte Esterase, Urine NEGATIVE NEGATIVE   Extra Urine Gray Tube   Result Value Ref Range    Extra Tube Hold for add-ons.    Lactic acid, arterial, whole blood   Result Value Ref Range    POCT Lactate, Arterial 1.7 0.4 - 2.0 mmol/L   POCT GLUCOSE   Result Value Ref Range    POCT Glucose 123 (H) 74 - 99 mg/dL      CT head wo IV contrast    Result Date: 10/16/2023  Interpreted By:  Topher Hendricks, STUDY: CT HEAD WO IV CONTRAST;  10/16/2023 8:05 pm   INDICATION: Signs/Symptoms:new confusion.   COMPARISON: CT head 06/07/2023   ACCESSION NUMBER(S): [id number]   ORDERING CLINICIAN: FRANKLIN WELCH   TECHNIQUE: Noncontrast axial CT images of head were obtained with coronal and sagittal reconstructed images.   FINDINGS: BRAIN PARENCHYMA: Mild generalized cerebral volume loss. No evidence of acute large territory infarction or transcortical edema. The deep gray structures are preserved. No mass-effect, midline shift or effacement of cerebral sulci. Confluent periventricular and subcortical white matter hypodensities, nonspecific but often seen in the setting of chronic microangiopathic disease.   HEMORRHAGE: No acute intracranial hemorrhage.   VENTRICLES and EXTRA-AXIAL SPACES: Prominence of the lateral and 3rd ventricles greater than expected for degree of cerebral volume loss. No abnormal extra-axial fluid collection.   ORBITS: The visualized orbits and globes are within normal limits.   EXTRACRANIAL SOFT TISSUES: Within normal limits.   PARANASAL SINUSES/MASTOIDS: The visualized paranasal sinuses and mastoid air cells are well aerated.   CALVARIUM: No depressed  skull fracture.   Brain Injury Guidelines (BIG) CT Values:   Skull fracture: No SDH (subdural hematoma): No EDH (epidural hematoma): No IPH (intraparenchymal hemorrhage): No SAH (subarachnoid hemorrhage): No IVH (intraventricular hemorrhage): No   Reference: Carl JENKINS, Judy JEFFERSON, Everton M, et al. The BIG (brain injury guidelines) project: defining the management of traumatic brain injury by acute care surgeons. J Trauma Acute Care Surg 2014; 76:965-969.       1. No acute intracranial abnormality identified. 2. Ventricular prominence greater than expected for degree of cerebral volume loss, consider normal pressure hydrocephalus or other hydrocephalus. 3. Chronic white matter changes likely related to small-vessel ischemic disease.       MACRO: None   Signed by: Topher Hendricks 10/16/2023 8:45 PM Dictation workstation:   [id number]    CT angio chest for pulmonary embolism    Result Date: 10/16/2023  Interpreted By:  Topher Hendricks, STUDY: CT ANGIO CHEST FOR PULMONARY EMBOLISM;  10/16/2023 8:05 pm   INDICATION: Signs/Symptoms:SOB, elevated dimer.   COMPARISON: CT chest 03/09/2023   ACCESSION NUMBER(S): [id number]   ORDERING CLINICIAN: TOPHER WANG   TECHNIQUE: Contiguous axial images of the chest were obtained after the intravenous administration of 75 mL Omnipaque 350 contrast using angiographic PE protocol. Coronal and sagittal reformatted images were reconstructed from the axial data. MIP images were created on an independent workstation and reviewed.   FINDINGS: PULMONARY ARTERIES: Adequate opacification to the level of the segmental arteries. The subsegmental arteries are suboptimally assessed due to mixing artifact and respiratory motion. No filling defect to suggest pulmonary embolus in the visualized pulmonary arteries. The main pulmonary artery is normal in diameter. No CT evidence of right heart strain.   HEART: Normal in size. Moderate to heavy triple-vessel coronary vascular calcifications.  Calcifications in the plane of the mitral valve. No significant pericardial effusion.   VESSELS: Normal caliber aorta without dissection. Heavy calcifications of the aortic arch and proximal great vessels as well as the descending thoracic aorta.   MEDIASTINUM AND LYMPH NODES: Visualized thyroid is within normal limits. No enlarged intrathoracic or axillary lymph nodes by imaging criteria. No pneumomediastinum. The esophagus appears within normal limits.   LUNG, AIRWAYS, AND PLEURA: Dependent debris within the distal trachea. Left lower lobe bronchial wall thickening with adjacent patchy and consolidative opacities. There is a background of diffuse centrilobular and paraseptal emphysematous change. Bibasilar scarring. Bandlike scarring in the right apex. No pleural effusion or pneumothorax. Scattered bilateral pleural calcifications most pronounced along the pleural surfaces of the right lower lobe. Findings suggest prior asbestos exposure.   OSSEOUS STRUCTURES: No acute osseous abnormality.   CHEST WALL SOFT TISSUES: No discernible abnormality.   UPPER ABDOMEN/OTHER: Heavy vascular calcifications in the upper abdomen.       1. No evidence of pulmonary embolus to the level of the segmental arteries. Filling defects within the subsegmental arteries can not be entirely excluded. 2. Bronchial wall thickening in the medial left lower lobe with adjacent patchy and consolidative opacities. Findings are most suggestive of pneumonia or aspiration pneumonitis. Follow-up is recommended to ensure resolution and exclude underlying lesion. 3. Diffuse background of emphysematous changes and pleural calcifications suggestive of prior asbestos exposure. 4. Please see additional findings and discussion as above.   MACRO: None   Signed by: Topher Hendricks 10/16/2023 8:36 PM Dictation workstation:   [id number]    XR chest 1 view    Result Date: 10/16/2023  Interpreted By:  Paco Andrews, STUDY: XR CHEST 1 VIEW   INDICATION:  Signs/Symptoms:hypoxia, confusion.   COMPARISON: June 7, 2023   ACCESSION NUMBER(S): [id number]   ORDERING CLINICIAN: FRANKLIN WELCH   FINDINGS: Extensive pleural-based calcifications in the bilateral lungs right worse than left, similar to prior study.   No new consolidation or edema. No evidence of pneumothorax or effusion.       Extensive calcified pleural plaques suggesting asbestos exposure similar to prior study. No evidence of acute intrathoracic abnormality.   Signed by: Paco Andrews 10/16/2023 5:27 PM Dictation workstation:   [id number]     Scheduled medications:  aspirin, 81 mg, oral, Daily  atorvastatin, 10 mg, oral, Daily  azithromycin, 500 mg, oral, q24h CAITY  cefTRIAXone, 2 g, intravenous, q24h  clopidogrel, 75 mg, oral, Daily  tiotropium, 2 Inhalation, inhalation, Daily   And  fluticasone furoate-vilanteroL, 1 puff, inhalation, Daily  heparin (porcine), 5,000 Units, subcutaneous, q8h  hydrocortisone sodium succinate, 50 mg, intravenous, q8h CAITY  [Held by provider] lisinopril, 40 mg, oral, Daily  pantoprazole, 40 mg, oral, Daily before breakfast   Or  pantoprazole, 40 mg, intravenous, Daily before breakfast  polyethylene glycol, 17 g, oral, Daily  [Held by provider] vibegron, 1 tablet, oral, Daily      Continuous medications:     PRN medications:       Assessment and Plan  * Acute pneumonia  Assessment & Plan  Continue on azithromycin, ceftriaxone. Follow cultures, urinary antigens. Procal / CRP pending. Continue to follow    Altered mental status  Assessment & Plan  2/2 above. Currently Aox1. Continue to monitor     Acute hypoxic respiratory failure (CMS/[facility])  Assessment & Plan  2/2 pneumonia. Supplemental oxygen as needed, wean as tolerated. Continue to follow    COPD (chronic obstructive pulmonary disease) (CMS/HCC)  Assessment & Plan  No baseline oxygen requirement. Continue home medications    Hyperlipidemia  Assessment & Plan  Continue home medications        DVT ppx    SCDs, SQ Heparin          Augustine Reyes PA-C

## 2023-10-17 NOTE — PROGRESS NOTES
Hai Laboy is a 78 y.o. male on day 1 of admission presenting with Acute pneumonia.    Subjective   Hai Laboy is a 78 y.o. male with PMH of COPD with no baseline oxygen requirement, prior stroke, HLD, prostate cancer, who presents with altered mental status. Patient unable to provide history due to mental status, history obtained by ED provider and chart review. Per report EMS was called for patient due to weakness. Upon arrival they found him to be confused and hypoxic on RA. At this time patient is not waking to provide history, will wake briefly to state he does not know why he is here or where he is, but does not answer any specific questions. Further history unable to be obtained at this time.      ED course: Afebrile, , RR 18, /91, pulse ox 94% on room air. WBC 16.5, Hgb 16.9. D-dimer 1935. Chemistries remarkable for K 5.7 -> 4.4. Lactate 2.1. Troponin 21. CT head shows ventricular prominence may be NPH vs other hydrocephalus. CT angio chest shows no evidence of acute PE, possible pneumonia vs aspiration pneumonitis, emphysematous changes. Patient          ED Course as of 10/16/23 2232   Mon Oct 16, 2023   1700 WBC(!): 16.5  Concern for sepsis at this time [WJ]   1714 Patient twelve-lead EKG inter by myself shows sinus tachycardia with a ventricular rate of 107, normal SC interval, normal axis, normal QRS duration, normal QT, no STEMI.  Interpretation is limited due to artifact back. [WJ]   1828 XR chest 1 view  Calcified plaques, similar to prior [NS]   1829 CBC and Auto Differential(!)  Leukocytosis noted. Unsure whether related to infection or potential steroid use. Will look into [NS]   1829 Blood Gas Venous Full Panel(!)  Lactate elevated with normal PH [NS]   1829 Comprehensive Metabolic Panel(!)  Mild hyperkalemia noted, mild hemolysis. Will recheck [NS]   1830 D-dimer, VTE Exclusion(!)  D dimer elevated, will require CT [NS]   2104 CT angio chest for pulmonary embolism  CT without  evidence of PE but shows concern for pna [NS]       ED Course User Index  [NS] Partha Hodge MD  [WJ] Srinath Kelly DO           Diagnoses as of 10/16/23 2232   Acute pneumonia   Hypoxia   Delirium         Past Medical History  He has a past medical history of Personal history of other infectious and parasitic diseases (11/26/2019), Personal history of other specified conditions (09/27/2019), and Personal history of other specified conditions (09/27/2019).     Surgical History  He has a past surgical history that includes Other surgical history (04/27/2018) and CT angio neck w and wo IV contrast (4/14/2023).     Social History  He reports that he has quit smoking. His smoking use included cigarettes. He has never used smokeless tobacco. He reports that he does not drink alcohol and does not use drugs.     Family History  Family History          Family History   Problem Relation Name Age of Onset    No Known Problems Mother        No Known Problems Father                Allergies  Patient has no known allergies.     Hai Laboy is a 78 y.o. male with PMH of COPD with no baseline oxygen requirement, prior stroke, HLD, prostate cancer, who presents with altered mental status. Patient unable to provide history due to mental status, history obtained by ED provider and chart review. Per report EMS was called for patient due to weakness. Upon arrival they found him to be confused and hypoxic on RA. At this time patient is not waking to provide history, will wake briefly to state he does not know why he is here or where he is, but does not answer any specific questions. Further history unable to be obtained at this time.      ED course: Afebrile, , RR 18, /91, pulse ox 94% on room air. WBC 16.5, Hgb 16.9. D-dimer 1935. Chemistries remarkable for K 5.7 -> 4.4. Lactate 2.1. Troponin 21. CT head shows ventricular prominence may be NPH vs other hydrocephalus. CT angio chest shows no evidence of acute  PE, possible pneumonia vs aspiration pneumonitis, emphysematous changes. Patient          ED Course as of 10/16/23 2232   Mon Oct 16, 2023   1700 WBC(!): 16.5  Concern for sepsis at this time [WJ]   1714 Patient twelve-lead EKG inter by myself shows sinus tachycardia with a ventricular rate of 107, normal TN interval, normal axis, normal QRS duration, normal QT, no STEMI.  Interpretation is limited due to artifact back. [WJ]   1828 XR chest 1 view  Calcified plaques, similar to prior [NS]   1829 CBC and Auto Differential(!)  Leukocytosis noted. Unsure whether related to infection or potential steroid use. Will look into [NS]   1829 Blood Gas Venous Full Panel(!)  Lactate elevated with normal PH [NS]   1829 Comprehensive Metabolic Panel(!)  Mild hyperkalemia noted, mild hemolysis. Will recheck [NS]   1830 D-dimer, VTE Exclusion(!)  D dimer elevated, will require CT [NS]   2104 CT angio chest for pulmonary embolism  CT without evidence of PE but shows concern for pna [NS]       ED Course User Index  [NS] Partha Hodge MD  [WJ] Srinath Kelly, DO           Diagnoses as of 10/16/23 2232   Acute pneumonia   Hypoxia   Delirium         Past Medical History  He has a past medical history of Personal history of other infectious and parasitic diseases (11/26/2019), Personal history of other specified conditions (09/27/2019), and Personal history of other specified conditions (09/27/2019).     Surgical History  He has a past surgical history that includes Other surgical history (04/27/2018) and CT angio neck w and wo IV contrast (4/14/2023).     Social History  He reports that he has quit smoking. His smoking use included cigarettes. He has never used smokeless tobacco. He reports that he does not drink alcohol and does not use drugs.     Family History  Family History          Family History   Problem Relation Name Age of Onset    No Known Problems Mother        No Known Problems Father                 Allergies  Patient has no known allergies.    10/17: Patient with some confusion today very weak states he was brought in the hospital because he had fallen after trying to adjust his TV antenna.  Did have a sepsis alert this afternoon giving him 500 cc bolus normal saline continuing IV antibiotics checking spot lactate stopping his lisinopril given short course of Solu-Cortef.  We will continue to monitor and review records and review medications.  There is some infiltrates on the CT scan although they are not incredibly impressive we will continue to look at blood cultures and other source of his infection as well.  Being treated for community-acquired pneumonia at this time.    Objective     Physical Exam  Constitutional:       Appearance: He is ill-appearing.      Comments: Weak and frail hunched over slightly sideways on the   HENT:      Head: Normocephalic.      Nose: Nose normal.      Mouth/Throat:      Mouth: Mucous membranes are moist.   Eyes:      Extraocular Movements: Extraocular movements intact.      Pupils: Pupils are equal, round, and reactive to light.   Cardiovascular:      Rate and Rhythm: Normal rate and regular rhythm.      Heart sounds: Normal heart sounds.   Pulmonary:      Breath sounds: Rhonchi present. No rales.   Abdominal:      General: Abdomen is flat.      Tenderness: There is no guarding.   Musculoskeletal:         General: Normal range of motion.      Cervical back: Neck supple. No rigidity.   Skin:     Capillary Refill: Capillary refill takes 2 to 3 seconds.      Coloration: Skin is pale.   Neurological:      General: No focal deficit present.      Mental Status: He is disoriented.   Psychiatric:         Behavior: Behavior normal.      Comments: His mentation somewhat slowed but able to carry on coherent, conversation but at times wandering off subject.    Patient clinically is improving throughout the day reevaluated mentation improving wants to know where his hat is.         Last  "Recorded Vitals  Blood pressure 100/58, pulse 66, temperature 36.5 °C (97.7 °F), temperature source Temporal, resp. rate 18, height 1.753 m (5' 9\"), weight 59 kg (130 lb 1.1 oz), SpO2 97 %.  Intake/Output last 3 Shifts:  I/O last 3 completed shifts:  In: - (0 mL/kg)   Out: 2 (0 mL/kg) [Urine:2 (0 mL/kg/hr)]  Weight: 59 kg     Relevant Results           This patient currently has cardiac telemetry ordered; if you would like to modify or discontinue the telemetry order, click here to go to the orders activity to modify/discontinue the order.             Scheduled medications  aspirin, 81 mg, oral, Daily  atorvastatin, 10 mg, oral, Daily  clopidogrel, 75 mg, oral, Daily  tiotropium, 2 Inhalation, inhalation, Daily   And  fluticasone furoate-vilanteroL, 1 puff, inhalation, Daily  heparin (porcine), 5,000 Units, subcutaneous, q8h  hydrocortisone sodium succinate, 50 mg, intravenous, q8h CAITY  [Held by provider] lisinopril, 40 mg, oral, Daily  pantoprazole, 40 mg, oral, Daily before breakfast   Or  pantoprazole, 40 mg, intravenous, Daily before breakfast  polyethylene glycol, 17 g, oral, Daily  [Held by provider] vibegron, 1 tablet, oral, Daily      Continuous medications     PRN medications  PRN medications: acetaminophen **OR** acetaminophen **OR** acetaminophen, acetaminophen **OR** acetaminophen **OR** acetaminophen, melatonin      Assessment/Plan   Principal Problem:    Acute pneumonia  Active Problems:    COPD (chronic obstructive pulmonary disease) (CMS/[facility])    Hyperlipidemia    Acute hypoxic respiratory failure (CMS/[facility])    Abnormal CT scan of head    Altered mental status    Encephalopathy    Cerebral ventriculomegaly    Rocephin 2 g IV daily reordered  Azithromycin 500 mg a day  IV fluid bolus 500 mL  IV Solu-Cortef 50 mg 3 times a day x3 doses  Hold antihypertensive medications  Continue aspirin 81 mg a day  Continue Lipitor 10 mg a day  Continue Plavix 75 mg a day  Spiriva 2 puffs daily  Advair substitute " daily  Protonix formulary milligrams daily  MiraLAX 17 mg daily  We will add as needed aerosols  Nasal cannula oxygen wean as able  DVT prophylaxis subcu heparin  Check lactate  Serial exams  Sepsis protocol  Follow cultures and urine studies  Check labs in a.m.  See orders for complete plan       I spent 50 minutes in the professional and overall care of this patient.      Lexa Medina MD

## 2023-10-17 NOTE — CONSULTS
History Of Present Illness  Hai Laboy is a 78 y.o. male ?-handed, admitted to [facility] on 10/16/23 presenting with altered mental status. Neurology consulted for abnormal head CT, ? NPH.    Inpatient consult to Neurology  Consult performed by: Omar Contreras MD  Consult ordered by: Augustine Reyes PA-C      Listed history of COPD nonsegmental oxygen, prior stroke 2017 (per imaging left sara), dyslipidemia, prostate cancer, known carotid artery disease (for follow-up with vascular surgery), known vertebral artery occlusion.    Patient seen this afternoon, no family members around.    Patient poor historian.  Seems confused and encephalopathic.  Most of history obtained from review of ER and admit notes.  Per report EMS was called for patient due to weakness.  He was found to be confused and hypoxic on room air.  There was report of urinary and bowel incontinence since last week and he smelled of strong urine, apparently both new to him.  He will wake briefly to answer simple questions, but does not answer any specific questions.  Further history unable to be obtained.  Head CT without contrast done in the ER showed findings of ventricular prominence greater than expected for degree of cerebral volume loss, consider NPH versus other, as well as multiple subcortical hypodensities bilaterally.  He was subsequently found to have acute pneumonia, and is being treated for bacterial pneumonia.    PCP note from earlier this year notes patient did not complain of any memory issues, however, he seems to have had poor insight into his medical issues and has had poor follow-up.    When seen this afternoon, patient appears to be awake, able to answer questions, but appears to be somewhat confused.  He knows he is in the hospital.  He thinks he is 37 or 38 years old.  He states he knows he fell at home and hit his head just prior to being in the hospital.  He cannot tell me why he fell.  He tells me he lives alone.  When asked  who the emergency contact in the chart is (last name Gonzalo), he initially told me he did not know who that is.  Later, when his nurse asked him about it, he says it was his neighbor.  He tells me that he walks with a cane, and that has been since his stroke (some months ago).  I do not know whether he had stroke a few months ago, I do know from review of imaging in PACS that he had an acute stroke in the left sara back in 2017.  He denies having any memory issues.  He denies being incontinent usually.  He denies any headache history.  No other information available to me.    Of note, I did review his previous head scans and found that he has had some degree of ventricular prominence with rounded ventricles as early as 2017 (earliest head imaging in PACS is from 2017).  Comparing 2017 imaging to current imaging, there was minimal increase in the size of ventricular prominence.    Review of Systems   Unable    Past Medical History  Past Medical History:   Diagnosis Date    Personal history of other infectious and parasitic diseases 11/26/2019    History of Clostridioides difficile colitis    Personal history of other specified conditions 09/27/2019    History of urinary incontinence    Personal history of other specified conditions 09/27/2019    History of elevated prostate specific antigen (PSA)       Surgical History  Past Surgical History:   Procedure Laterality Date    CT NECK ANGIO W AND WO IV CONTRAST  4/14/2023    CT NECK ANGIO W AND WO IV CONTRAST POR CT    OTHER SURGICAL HISTORY  04/27/2018    Wrist Surgery       Social History  Social History     Tobacco Use    Smoking status: Former     Types: Cigarettes    Smokeless tobacco: Never   Vaping Use    Vaping Use: Never used   Substance Use Topics    Alcohol use: Never    Drug use: Never       Home Meds  Medications Prior to Admission   Medication Sig Dispense Refill Last Dose    clopidogrel (Plavix) 75 mg tablet TAKE 1 TABLET BY MOUTH ONCE DAILY. 30 tablet 6   "   albuterol 2.5 mg /3 mL (0.083 %) nebulizer solution Inhale 3 mL every 6 hours.       albuterol 90 mcg/actuation inhaler Inhale every 6 hours if needed.       aspirin 81 mg EC tablet Take 1 tablet (81 mg) by mouth once daily.       atorvastatin (Lipitor) 10 mg tablet Take 1 tablet (10 mg) by mouth once daily.       Gemtesa 75 mg tablet Take 1 tablet (75 mg) by mouth once daily. 90 tablet 3     lisinopril 40 mg tablet Take 1 tablet (40 mg) by mouth once daily.       multivitamin tablet Take 1 tablet by mouth once daily.       Trelegy Ellipta 100-62.5-25 mcg blister with device Inhale.          Allergies  Patient has no known allergies.    Current Meds  Scheduled medications  aspirin, 81 mg, oral, Daily  atorvastatin, 10 mg, oral, Daily  clopidogrel, 75 mg, oral, Daily  tiotropium, 2 Inhalation, inhalation, Daily   And  fluticasone furoate-vilanteroL, 1 puff, inhalation, Daily  heparin (porcine), 5,000 Units, subcutaneous, q8h  hydrocortisone sodium succinate, 50 mg, intravenous, q8h CAITY  [Held by provider] lisinopril, 40 mg, oral, Daily  pantoprazole, 40 mg, oral, Daily before breakfast   Or  pantoprazole, 40 mg, intravenous, Daily before breakfast  polyethylene glycol, 17 g, oral, Daily  [Held by provider] vibegron, 1 tablet, oral, Daily      Continuous medications     PRN medications  PRN medications: acetaminophen **OR** acetaminophen **OR** acetaminophen, acetaminophen **OR** acetaminophen **OR** acetaminophen, melatonin    Last Recorded Vitals  Blood pressure 100/58, pulse 66, temperature 36.5 °C (97.7 °F), temperature source Temporal, resp. rate 18, height 1.753 m (5' 9\"), weight 59 kg (130 lb 1.1 oz), SpO2 97 %.    Awake, alert, oriented to place and name only, on supplemental oxygen  Appears mildly confused when answering  Appears his stated age, in bed   No leg edema    Mental status exam as above, conversant   Fund of knowledge/memory unable to formally assess  Fair attention span  Pupils round reactive " to light, 3-4 mm, (-) RAPD   Fundoscopic examination was attempted but fundus was not visualized bilaterally   Full EOMs intact, no nystagmus, no ptosis   V1 to V3 sensation is intact   No facial droop   Hearing grossly intact   No dysarthria  Good shoulder shrug bilaterally   Tongue is midline     Motor strength is at least 4+/5 on all extremities, tone/bulk normal   I am not seeing any gross asymmetry in strength bilaterally, although patient is not the most cooperative either  Reflexes 1+ on all 4 extremities except trace on bilateral ankles, downgoing toes bilaterally   Sensation is intact to light touch on all 4 extremities   Finger to nose test intact bilaterally   I did not have him stand or walk    Relevant Results  I have personally reviewed the following:    Labs  Results for orders placed or performed during the hospital encounter of 10/16/23 (from the past 24 hour(s))   CBC and Auto Differential   Result Value Ref Range    WBC 16.5 (H) 4.4 - 11.3 x10*3/uL    nRBC 0.0 0.0 - 0.0 /100 WBCs    RBC 5.50 4.50 - 5.90 x10*6/uL    Hemoglobin 16.9 13.5 - 17.5 g/dL    Hematocrit 50.1 41.0 - 52.0 %    MCV 91 80 - 100 fL    MCH 30.7 26.0 - 34.0 pg    MCHC 33.7 32.0 - 36.0 g/dL    RDW 14.3 11.5 - 14.5 %    Platelets 275 150 - 450 x10*3/uL    MPV 10.7 7.5 - 11.5 fL    Neutrophils % 83.2 40.0 - 80.0 %    Immature Granulocytes %, Automated 0.5 0.0 - 0.9 %    Lymphocytes % 4.8 13.0 - 44.0 %    Monocytes % 11.0 2.0 - 10.0 %    Eosinophils % 0.1 0.0 - 6.0 %    Basophils % 0.4 0.0 - 2.0 %    Neutrophils Absolute 13.74 (H) 1.60 - 5.50 x10*3/uL    Immature Granulocytes Absolute, Automated 0.09 0.00 - 0.50 x10*3/uL    Lymphocytes Absolute 0.79 (L) 0.80 - 3.00 x10*3/uL    Monocytes Absolute 1.81 (H) 0.05 - 0.80 x10*3/uL    Eosinophils Absolute 0.01 0.00 - 0.40 x10*3/uL    Basophils Absolute 0.07 0.00 - 0.10 x10*3/uL   Comprehensive Metabolic Panel   Result Value Ref Range    Glucose 141 (H) 74 - 99 mg/dL    Sodium 137 136 - 145  mmol/L    Potassium 5.7 (H) 3.5 - 5.3 mmol/L    Chloride 103 98 - 107 mmol/L    Bicarbonate 20 (L) 21 - 32 mmol/L    Anion Gap 20 10 - 20 mmol/L    Urea Nitrogen 39 (H) 6 - 23 mg/dL    Creatinine 1.29 0.50 - 1.30 mg/dL    eGFR 56 (L) >60 mL/min/1.73m*2    Calcium 10.0 8.6 - 10.3 mg/dL    Albumin 4.6 3.4 - 5.0 g/dL    Alkaline Phosphatase 87 33 - 136 U/L    Total Protein 8.2 6.4 - 8.2 g/dL    AST 21 9 - 39 U/L    Bilirubin, Total 0.9 0.0 - 1.2 mg/dL    ALT 6 (L) 10 - 52 U/L   Blood Culture    Specimen: Peripheral Venipuncture; Blood culture   Result Value Ref Range    Blood Culture Loaded on Instrument - Culture in progress    Blood Culture    Specimen: Peripheral Venipuncture; Blood culture   Result Value Ref Range    Blood Culture Loaded on Instrument - Culture in progress    Troponin I, High Sensitivity   Result Value Ref Range    Troponin I, High Sensitivity 21 (H) 0 - 20 ng/L   D-dimer, VTE Exclusion   Result Value Ref Range    D-Dimer, Quantitative VTE Exclusion 1,935 (H) <=500 ng/mL FEU   Lactate   Result Value Ref Range    Lactate 2.1 (H) 0.4 - 2.0 mmol/L   Blood Gas Venous Full Panel   Result Value Ref Range    POCT pH, Venous 7.38 7.33 - 7.43 pH    POCT pCO2, Venous 46 41 - 51 mm Hg    POCT pO2, Venous 35 35 - 45 mm Hg    POCT SO2, Venous 54 45 - 75 %    POCT Oxy Hemoglobin, Venous 53.0 45.0 - 75.0 %    POCT Hematocrit Calculated, Venous 47.0 41.0 - 52.0 %    POCT Sodium, Venous 137 136 - 145 mmol/L    POCT Potassium, Venous 4.5 3.5 - 5.3 mmol/L    POCT Chloride, Venous 102 98 - 107 mmol/L    POCT Ionized Calicum, Venous 1.16 1.10 - 1.33 mmol/L    POCT Glucose, Venous 148 (H) 74 - 99 mg/dL    POCT Lactate, Venous 2.4 (H) 0.4 - 2.0 mmol/L    POCT Base Excess, Venous 1.4 -2.0 - 3.0 mmol/L    POCT HCO3 Calculated, Venous 27.2 (H) 22.0 - 26.0 mmol/L    POCT Hemoglobin, Venous 15.5 13.5 - 17.5 g/dL    POCT Anion Gap, Venous 12.0 10.0 - 25.0 mmol/L    Patient Temperature 37.0 degrees Celsius    FiO2 37 %    Influenza A, and B PCR   Result Value Ref Range    Flu A Result Not Detected Not Detected    Flu B Result Not Detected Not Detected   SARS-CoV-2 RT PCR   Result Value Ref Range    Coronavirus 2019, PCR Not Detected Not Detected   Potassium   Result Value Ref Range    Potassium 4.4 3.5 - 5.3 mmol/L   Rapid HIV   Result Value Ref Range    Rapid HIV Nonreactive Nonreactive   Hepatitis B Surface Antigen   Result Value Ref Range    Hepatitis B Surface AG Nonreactive Nonreactive   CBC   Result Value Ref Range    WBC 9.9 4.4 - 11.3 x10*3/uL    nRBC 0.0 0.0 - 0.0 /100 WBCs    RBC 4.40 (L) 4.50 - 5.90 x10*6/uL    Hemoglobin 13.6 13.5 - 17.5 g/dL    Hematocrit 39.4 (L) 41.0 - 52.0 %    MCV 90 80 - 100 fL    MCH 30.9 26.0 - 34.0 pg    MCHC 34.5 32.0 - 36.0 g/dL    RDW 13.9 11.5 - 14.5 %    Platelets 238 150 - 450 x10*3/uL    MPV 10.3 7.5 - 11.5 fL   Comprehensive metabolic panel   Result Value Ref Range    Glucose 173 (H) 74 - 99 mg/dL    Sodium 140 136 - 145 mmol/L    Potassium 4.0 3.5 - 5.3 mmol/L    Chloride 105 98 - 107 mmol/L    Bicarbonate 28 21 - 32 mmol/L    Anion Gap 11 10 - 20 mmol/L    Urea Nitrogen 38 (H) 6 - 23 mg/dL    Creatinine 1.15 0.50 - 1.30 mg/dL    eGFR 64 >60 mL/min/1.73m*2    Calcium 9.3 8.6 - 10.3 mg/dL    Albumin 3.7 3.4 - 5.0 g/dL    Alkaline Phosphatase 69 33 - 136 U/L    Total Protein 6.4 6.4 - 8.2 g/dL    AST 12 9 - 39 U/L    Bilirubin, Total 0.6 0.0 - 1.2 mg/dL    ALT 9 (L) 10 - 52 U/L   Procalcitonin   Result Value Ref Range    Procalcitonin 0.10 (H) <=0.07 ng/mL   C-Reactive Protein   Result Value Ref Range    C-Reactive Protein 6.86 (H) <1.00 mg/dL   Urinalysis with Reflex Microscopic and Culture   Result Value Ref Range    Color, Urine Yellow Straw, Yellow    Appearance, Urine Hazy (N) Clear    Specific Gravity, Urine 1.057 (N) 1.005 - 1.035    pH, Urine 6.0 5.0, 5.5, 6.0, 6.5, 7.0, 7.5, 8.0    Protein, Urine NEGATIVE NEGATIVE mg/dL    Glucose, Urine NEGATIVE NEGATIVE mg/dL    Blood, Urine  NEGATIVE NEGATIVE    Ketones, Urine 5 (TRACE) (A) NEGATIVE mg/dL    Bilirubin, Urine NEGATIVE NEGATIVE    Urobilinogen, Urine <2.0 <2.0 mg/dL    Nitrite, Urine NEGATIVE NEGATIVE    Leukocyte Esterase, Urine NEGATIVE NEGATIVE   Extra Urine Gray Tube   Result Value Ref Range    Extra Tube Hold for add-ons.    Lactic acid, arterial, whole blood   Result Value Ref Range    POCT Lactate, Arterial 1.7 0.4 - 2.0 mmol/L       Imaging results  No MRI head results found for the past 12 months   CT head wo IV contrast    Result Date: 10/16/2023  Interpreted By:  Topher Hendricks, STUDY: CT HEAD WO IV CONTRAST;  10/16/2023 8:05 pm   INDICATION: Signs/Symptoms:new confusion.   COMPARISON: CT head 06/07/2023   ACCESSION NUMBER(S): [id number]   ORDERING CLINICIAN: FRANKLIN WELCH   TECHNIQUE: Noncontrast axial CT images of head were obtained with coronal and sagittal reconstructed images.   FINDINGS: BRAIN PARENCHYMA: Mild generalized cerebral volume loss. No evidence of acute large territory infarction or transcortical edema. The deep gray structures are preserved. No mass-effect, midline shift or effacement of cerebral sulci. Confluent periventricular and subcortical white matter hypodensities, nonspecific but often seen in the setting of chronic microangiopathic disease.   HEMORRHAGE: No acute intracranial hemorrhage.   VENTRICLES and EXTRA-AXIAL SPACES: Prominence of the lateral and 3rd ventricles greater than expected for degree of cerebral volume loss. No abnormal extra-axial fluid collection.   ORBITS: The visualized orbits and globes are within normal limits.   EXTRACRANIAL SOFT TISSUES: Within normal limits.   PARANASAL SINUSES/MASTOIDS: The visualized paranasal sinuses and mastoid air cells are well aerated.   CALVARIUM: No depressed skull fracture.   Brain Injury Guidelines (BIG) CT Values:   Skull fracture: No SDH (subdural hematoma): No EDH (epidural hematoma): No IPH (intraparenchymal hemorrhage): No SAH (subarachnoid  hemorrhage): No IVH (intraventricular hemorrhage): No   Reference: Carl JENKINS, Judy RS, Everton M, et al. The BIG (brain injury guidelines) project: defining the management of traumatic brain injury by acute care surgeons. J Trauma Acute Care Surg 2014; 76:965-969.       1. No acute intracranial abnormality identified. 2. Ventricular prominence greater than expected for degree of cerebral volume loss, consider normal pressure hydrocephalus or other hydrocephalus. 3. Chronic white matter changes likely related to small-vessel ischemic disease.       MACRO: None   Signed by: Topher Hendricks 10/16/2023 8:45 PM Dictation workstation:   [id number]    CT head wo IV contrast    Result Date: 6/7/2023  Interpreted By:  SCHOENBERGER, JOSEPH, MD MRN: [id number] Patient Name: RUSS KHAN  STUDY: CT HEAD WO CONTRAST;  6/7/2023 10:59 am  INDICATION: fall .  COMPARISON: None.  ACCESSION NUMBER(S): [id number]  ORDERING CLINICIAN: AMBER COX  TECHNIQUE: Noncontrast axial CT scan of head was performed. Angled reformats in brain and bone windows were generated. The images were reviewed in bone, brain, blood and soft tissue windows.  FINDINGS: CSF Spaces: Enlarged due to parenchymal volume loss. Normal configuration with intact basal cisterns. There is no extraaxial fluid collection.  Parenchyma: Extensive periventricular deep white matter hypoattenuation consistent with small vessel ischemic white matter demyelination. The grey-white differentiation is intact. There is no mass effect or midline shift.  There is no intracranial hemorrhage.  Calvarium: The calvarium is unremarkable.  Paranasal sinuses and mastoids: Visualized paranasal sinuses and mastoids are clear.      Atrophy and chronic ischemic white matter demyelination without acute intracranial findings.  No evidence of intracranial hemorrhage or displaced skull fracture.      Assessment/Plan  Problem List Items Addressed This Visit             ICD-10-CM    Atherosclerosis of both  "carotid arteries I65.23     Seeing vascular surgery Dr Sal outpatient  Listed to be on ASA and clopidogrel and statin, unknown adherence         Cerebrovascular accident (CVA) (CMS/[facility]) I63.9    * (Principal) Acute pneumonia J18.9     Per primary team         RESOLVED: Abnormal CT scan of head R93.0     Ventricular prominence noted on CT, ? Concern for NPH. Will consult neurology. Continue to follow         Encephalopathy G93.40     Unknown history, presumably acute encephalopathy  Ddx: Toxic metabolic (pneumonia) versus other  Ventricular prominence/ventriculomegaly seen in the CT scan DOES NOT appear to be acute (has been present since at least 2017) and doubt this is contributing to acute encephalopathy  Clinically low likelihood of CNS infection         Cerebral ventriculomegaly - Primary G93.89     Present since at least 2017, minimally increased in size since then  Clinical question of ? NPH will be difficult to answer without corroborating history  Patient currently encephalopathic and unknown veracity of history and answers  Patient denies memory issues and incontinence  Patient says he uses a cane to walk since he had a stroke  I tried to call listed emergency contact \"Daniel Sánchez\" in the chart with the listed contact number to try to gather some information.  Mr. Sánchez apparently is his neighbor per patient.  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 NPH  Continue antiplatelet and statin  Follow-up with vascular surgery outpatient for chronic known cerebrovascular issues  Check B12 level/urine drug screen    Message sent to primary team Dr Medina.    All questions answered. Please call with questions.    Omar Contreras MD  10/17/2023  3:00 PM

## 2023-10-17 NOTE — ED PROVIDER NOTES
HPI   Chief Complaint   Patient presents with    Weakness, Gen     Inability to ambulate yesterday and today. Strong urine smell. Has been incontinent since last week and involuntary of bowels since last week. Both new to him.        HPI                    [place] Coma Scale Score: 13                  Patient History   Past Medical History:   Diagnosis Date    Personal history of other infectious and parasitic diseases 11/26/2019    History of Clostridioides difficile colitis    Personal history of other specified conditions 09/27/2019    History of urinary incontinence    Personal history of other specified conditions 09/27/2019    History of elevated prostate specific antigen (PSA)     Past Surgical History:   Procedure Laterality Date    CT NECK ANGIO W AND WO IV CONTRAST  4/14/2023    CT NECK ANGIO W AND WO IV CONTRAST POR CT    OTHER SURGICAL HISTORY  04/27/2018    Wrist Surgery     Family History   Problem Relation Name Age of Onset    No Known Problems Mother      No Known Problems Father       Social History     Tobacco Use    Smoking status: Former     Types: Cigarettes    Smokeless tobacco: Never   Vaping Use    Vaping Use: Never used   Substance Use Topics    Alcohol use: Never    Drug use: Never       Physical Exam   ED Triage Vitals   Temp Heart Rate Resp BP   10/16/23 1543 10/16/23 1543 10/16/23 1543 10/16/23 1543   36.7 °C (98.1 °F) 102 18 (!) 136/91      SpO2 Temp Source Heart Rate Source Patient Position   10/16/23 1543 10/16/23 1543 10/16/23 1948 10/16/23 1543   94 % Temporal Monitor Sitting      BP Location FiO2 (%)     10/16/23 1543 --     Left arm        Physical Exam    ED Course & [facility]   ED Course as of 10/16/23 2137   Mon Oct 16, 2023   1700 WBC(!): 16.5  Concern for sepsis at this time [WJ]   1714 Patient twelve-lead EKG inter by myself shows sinus tachycardia with a ventricular rate of 107, normal NE interval, normal axis, normal QRS duration, normal QT, no STEMI.  Interpretation is limited  due to artifact back. [WJ]   1828 XR chest 1 view  Calcified plaques, similar to prior [NS]   1829 CBC and Auto Differential(!)  Leukocytosis noted. Unsure whether related to infection or potential steroid use. Will look into [NS]   1829 Blood Gas Venous Full Panel(!)  Lactate elevated with normal PH [NS]   1829 Comprehensive Metabolic Panel(!)  Mild hyperkalemia noted, mild hemolysis. Will recheck [NS]   1830 D-dimer, VTE Exclusion(!)  D dimer elevated, will require CT [NS]   2104 CT angio chest for pulmonary embolism  CT without evidence of PE but shows concern for pna [NS]      ED Course User Index  [NS] Partha Hodge MD  [WJ] Srinath Kelly, DO       Medical Decision Making      Procedure  Procedures

## 2023-10-17 NOTE — ASSESSMENT & PLAN NOTE
"Present since at least 2017, minimally increased in size since then  Clinical question of ? NPH will be difficult to answer without corroborating history  Patient currently encephalopathic and unknown veracity of history and answers  Patient denies memory issues and incontinence  Patient says he uses a cane to walk since he had a stroke  I tried to call listed emergency contact \"Daniel Sánchez\" in the chart with the listed contact number to try to gather some information.  Mr. Sánchez apparently is his neighbor per patient.  I called him twice this afternoon (before 1 PM, and at 2:30 PM), no answer, no ability to leave voicemail    "

## 2023-10-17 NOTE — ASSESSMENT & PLAN NOTE
Unknown history, presumably acute encephalopathy  Ddx: Toxic metabolic (pneumonia) versus other  Ventricular prominence/ventriculomegaly seen in the CT scan DOES NOT appear to be acute (has been present since at least 2017) and doubt this is contributing to acute encephalopathy  Clinically low likelihood of CNS infection

## 2023-10-17 NOTE — ASSESSMENT & PLAN NOTE
Seeing vascular surgery Dr Sal outpatient  Listed to be on ASA and clopidogrel and statin, unknown adherence

## 2023-10-17 NOTE — PROGRESS NOTES
"Pt admitted with altered mental status.  He tells me that he does not recall why he is here.  I oriented him to situation.  He was able to answer questions about home circumstances-lives alone, has a friend that drives him and helps with meals (neighbor, Mr Rodríguez).  He denies that he has any family \"they are all gone.\" He states he does use a cane (verified from last admit) and that PCP is Dr. Belle.  He is on supplemental O2 but denies using it at home.  Pt may benefit from PT/OT evals. Will discuss with Dr. Medina. Discharge dispo undetermined at this time.  "

## 2023-10-17 NOTE — PROGRESS NOTES
"Nutrition Assessment Note  Reason for Assessment  Reason for Assessment: Admission nursing screening    History:  PMH includes COPD with no baseline oxygen requirement, prior stroke, HLD, prostate cancer     History of present illness:  Patient presents from home with AMS, weakness, hypoxia.      10/17:  Patient awake at time of visit, finishing lunch, poor intake per visual, patient reported meal intake was better than usual.  Patient reports not having dentures in, needs soft foods, will modify diet.  Reviewed supplement options, patient enjoys milk, agreeable to trial Ensure plus high protein BID.  Severe malnutrition indicated.        Energy Intake: Poor < 50 %  Food and Nutrient History: Patient lives alone, noted neighbor helps with meals.  Patient consumed 1-2 meatballs, milk, and ice cream for lunch per visual, reports this is more intake than usual at home.  Vitamin/Herbal Supplement Use: Does not take oral supplements at home    Current Diet: Adult diet Cardiac; 70 gm fat; 2 - 3 grams Sodium  Skin: Pressure injury to sacrum, no staging noted   Last BM Date:10/16/23      Anthropometrics:  Height: 175.3 cm (5' 9\")  Weight: 59 kg (130 lb 1.1 oz)  BMI (Calculated): 19.2  IBW/kg (Dietitian Calculated): 72.7 kg  Percent of IBW: 81 %       Weight History / % Weight Change: 19% loss indicated x 5 months  Significant Weight Loss: Yes  Interpretation of Weight Loss: >10% in 6 months  Wt Readings from Last 10 Encounters:   10/16/23 59 kg (130 lb 1.1 oz)   09/21/23 60.8 kg (134 lb)   05/30/23 72.6 kg (160 lb)   03/29/23 65.8 kg (145 lb)   02/20/23 65.3 kg (144 lb)   09/15/21 75.8 kg (167 lb 2 oz)   04/13/21 76.4 kg (168 lb 8 oz)   01/13/21 76.2 kg (168 lb)   09/25/20 75.3 kg (166 lb)       Labs:  Results from last 7 days   Lab Units 10/17/23  0416 10/16/23  1809 10/16/23  1628   GLUCOSE mg/dL 173*  --  141*   SODIUM mmol/L 140  --  137   POTASSIUM mmol/L 4.0 4.4 5.7*   CHLORIDE mmol/L 105  --  103   CO2 mmol/L 28  --  " 20*   BUN mg/dL 38*  --  39*   CREATININE mg/dL 1.15  --  1.29   EGFR mL/min/1.73m*2 64  --  56*   CALCIUM mg/dL 9.3  --  10.0     Lab Results   Component Value Date    HGBA1C 5.6 02/20/2023    HGBA1C 6.4 (A) 09/03/2021    HGBA1C 6.7 01/14/2021       Energy Needs:  Total Energy Estimated Needs (kCal):  (0710-8048)  Total Estimated Energy Need per Day (kCal/kg):  (35, CBW)    Total Protein Estimated Needs (g):  (70-80)  Total Protein Estimated Needs (g/kg):  (1.2-1.3, CBW)    Total Fluid Estimated Needs (mL):  (1800)  Total Fluid Estimated Needs (mL/kg):  (30, CBW)      Nutrition Focused Physical Findings:  Subcutaneous Fat Loss  Orbital Fat Pads: Mild-Moderate (slight dark circles and slight hollowing)  Buccal Fat Pads: Severe (hollow, sunken and narrow face)  Triceps: Severe (negligible fat tissue)  Ribs: Mild-Moderate (ribs protrude, illiac crest prominent)    Muscle Wasting  Temporalis: Severe (hollowed scooping depression)  Pectoralis (Clavicular Region): Severe (protruding prominent clavicle)  Deltoid/Trapezius: Severe (squared shoulders, acromion process prominent)  Interosseous: Defer  Trapezius/Infraspinatus/Supraspinatus (Scapular Region): Defer  Quadriceps: Defer  Gastrocnemius: Defer      Diagnosis   Malnutrition Diagnosis  Patient has Malnutrition Diagnosis: Yes  Diagnosis Status: New  Malnutrition Diagnosis: Severe malnutrition related to starvation  As Evidenced by: severe muscle and fat loss, 19% weight loss indicated x 5 months, intake <75% of estimated energy needs > 1 month      Interventions/Recommendations   Individualized Nutrition Prescription Provided for : Add soft/easy to chew to cardiac diet,  Add Ensure plus high protein supplement BID      Monitoring and Evaluation   Will continue to monitor po intakes and tolerance, weight, I/Os, labs, skin, GI tolerance    Follow Up  Time Spent (min): 45 minutes  Last Date of Nutrition Visit: 10/17/23  Nutrition Follow-Up Needed?: Dietitian to reassess  per policy  Follow up Comment: 10/20

## 2023-10-17 NOTE — DOCUMENTATION CLARIFICATION NOTE
"    PATIENT:               RUSS KHAN  ACCT #:                  [id number]  MRN:                       [id number]  :                       1945  ADMIT DATE:       10/16/2023 3:43 PM  DISCH DATE:  RESPONDING PROVIDER #:        [id number]          PROVIDER RESPONSE TEXT:    Sepsis due to pneumonia with associated acute respiratory organ dysfunction of acute hypoxic respiratory failure POA    CDI QUERY TEXT:    [facility]_Sepsis Link Organ Dysfunction    Instruction:    Based on your assessment of the patient and the clinical information, please provide the requested documentation by clicking on the appropriate radio button and enter any additional information if prompted.    Question: Please further clarify if a relationship exists between the Sepsis and acute organ dysfunction    When answering this query, please exercise your independent professional judgment. The fact that a question is being asked, does not imply that any particular answer is desired or expected.    The patient's clinical indicators include:  Clinical Information: Patient admitted with pneumonia, AMS, and acute respiratory failure with noted concern for sepsis    Clinical Indicators: Per ED, \"Patient had a leukocytosis of 16.5. This was at 1700 hrs. and I was concerned for sepsis at this time. However, no source identified yet.\"    H&P, \"Acute pneumonia..Altered mental status- 2/2 above. Currently Aox1. Continue to monitor. Acute hypoxic respiratory failure.\"    10/16: WBC 16.5, abs neutrophils 13.74, temp 98.1, pulse , resps 18, /91- pt on nasal cannula-nonrebreather    Treatment: IV zithromax, IV rocephin, CXR, blood cultures, supplemental O2    Risk Factors: 78yom admitted with pneumonia, AMS, and acute respiratory failure with noted concern for sepsis  Options provided:  -- Sepsis due to pneumonia with associated acute respiratory organ dysfunction of acute hypoxic respiratory failure POA  -- Sepsis due to pneumonia with other " associated acute organ dysfunction POA, Please specify additional information below  -- Sepsis has been ruled out  -- Other - I will add my own diagnosis  -- Refer to Clinical Documentation Reviewer    Query created by: Rocio Shannon on 10/17/2023 10:29 AM      Electronically signed by:  GALA LAYNE MD 10/17/2023 5:14 PM

## 2023-10-17 NOTE — PROGRESS NOTES
Emergency Medicine Transition of Care Note.    I received Hai Laboy in signout from Dr. Kelly.  Please see the previous ED provider note for all HPI, PE and MDM up to the time of signout at 1700. This is in addition to the primary record.    In brief Hai Laboy is an 78 y.o. male presenting for weakness and SOB, new O2 requirement  Chief Complaint   Patient presents with    Weakness, Gen     Inability to ambulate yesterday and today. Strong urine smell. Has been incontinent since last week and involuntary of bowels since last week. Both new to him.      At the time of signout we were awaiting: work-up    ED Course as of 10/16/23 2149   Mon Oct 16, 2023   1700 WBC(!): 16.5  Concern for sepsis at this time [WJ]   1714 Patient twelve-lead EKG inter by myself shows sinus tachycardia with a ventricular rate of 107, normal RI interval, normal axis, normal QRS duration, normal QT, no STEMI.  Interpretation is limited due to artifact back. [WJ]   1828 XR chest 1 view  Calcified plaques, similar to prior [NS]   1829 CBC and Auto Differential(!)  Leukocytosis noted. Unsure whether related to infection or potential steroid use. Will look into [NS]   1829 Blood Gas Venous Full Panel(!)  Lactate elevated with normal PH [NS]   1829 Comprehensive Metabolic Panel(!)  Mild hyperkalemia noted, mild hemolysis. Will recheck [NS]   1830 D-dimer, VTE Exclusion(!)  D dimer elevated, will require CT [NS]   2104 CT angio chest for pulmonary embolism  CT without evidence of PE but shows concern for pna [NS]      ED Course User Index  [NS] Partha Hodge MD  [WJ] Srinath Kelly, DO         Diagnoses as of 10/16/23 2149   Acute pneumonia   Hypoxia   Delirium       MDM:  All mentioned lab results, ECGs, and imaging were independently reviewed by myself  - Patient evaluated.  Patient presenting for weakness, inability to ambulate, incontinence, concerned that he is falling and confused.  At the time of signout he is pending his  work-up.Work-up demonstrates a leukocytosis, slightly elevated troponin, a mild hyperkalemia that is mild hemolysis, repeat potassium within normal limits, COVID and flu are negative, no acidosis.  D-dimer is elevated and so after the chest x-ray returned is unremarkable a CT was obtained.  CT angio demonstrates likely pneumonia without any evidence of a PE.  CT of the head demonstrates ventricular prominence without any definitive pathology.  Given his hypoxia and confusion in the setting of pneumonia we will admit the patient for work-up.  - Monitored for any changes in stability or symptomatology. Patient remained stable.       *Disclaimer: This note was dictated by speech recognition. Minor errors in transcription may be present. Please call with questions.    Melvin Hodge MD      Final diagnoses:   [J18.9] Acute pneumonia   [R09.02] Hypoxia   [R41.0] Delirium           Procedure  Procedures    Partha Hodge MD

## 2023-10-17 NOTE — ASSESSMENT & PLAN NOTE
Continue on azithromycin, ceftriaxone. Follow cultures, urinary antigens. Procal / CRP pending. Continue to follow

## 2023-10-18 LAB
AMPHETAMINES UR QL SCN: NORMAL
ANION GAP SERPL CALC-SCNC: 10 MMOL/L (ref 10–20)
BARBITURATES UR QL SCN: NORMAL
BASOPHILS # BLD AUTO: 0.01 X10*3/UL (ref 0–0.1)
BASOPHILS NFR BLD AUTO: 0.1 %
BENZODIAZ UR QL SCN: NORMAL
BUN SERPL-MCNC: 42 MG/DL (ref 6–23)
BZE UR QL SCN: NORMAL
CALCIUM SERPL-MCNC: 9.1 MG/DL (ref 8.6–10.3)
CANNABINOIDS UR QL SCN: NORMAL
CHLORIDE SERPL-SCNC: 106 MMOL/L (ref 98–107)
CO2 SERPL-SCNC: 28 MMOL/L (ref 21–32)
CREAT SERPL-MCNC: 1.18 MG/DL (ref 0.5–1.3)
EOSINOPHIL # BLD AUTO: 0 X10*3/UL (ref 0–0.4)
EOSINOPHIL NFR BLD AUTO: 0 %
ERYTHROCYTE [DISTWIDTH] IN BLOOD BY AUTOMATED COUNT: 14.2 % (ref 11.5–14.5)
FENTANYL+NORFENTANYL UR QL SCN: NORMAL
GFR SERPL CREATININE-BSD FRML MDRD: 62 ML/MIN/1.73M*2
GLUCOSE SERPL-MCNC: 123 MG/DL (ref 74–99)
HCT VFR BLD AUTO: 38.3 % (ref 41–52)
HGB BLD-MCNC: 12.8 G/DL (ref 13.5–17.5)
IMM GRANULOCYTES # BLD AUTO: 0.09 X10*3/UL (ref 0–0.5)
IMM GRANULOCYTES NFR BLD AUTO: 0.7 % (ref 0–0.9)
LYMPHOCYTES # BLD AUTO: 0.66 X10*3/UL (ref 0.8–3)
LYMPHOCYTES NFR BLD AUTO: 5.3 %
MAGNESIUM SERPL-MCNC: 2.16 MG/DL (ref 1.6–2.4)
MCH RBC QN AUTO: 30.3 PG (ref 26–34)
MCHC RBC AUTO-ENTMCNC: 33.4 G/DL (ref 32–36)
MCV RBC AUTO: 91 FL (ref 80–100)
MONOCYTES # BLD AUTO: 0.94 X10*3/UL (ref 0.05–0.8)
MONOCYTES NFR BLD AUTO: 7.5 %
NEUTROPHILS # BLD AUTO: 10.85 X10*3/UL (ref 1.6–5.5)
NEUTROPHILS NFR BLD AUTO: 86.4 %
NRBC BLD-RTO: 0 /100 WBCS (ref 0–0)
OPIATES UR QL SCN: NORMAL
OXYCODONE+OXYMORPHONE UR QL SCN: NORMAL
PCP UR QL SCN: NORMAL
PLATELET # BLD AUTO: 198 X10*3/UL (ref 150–450)
PMV BLD AUTO: 9.8 FL (ref 7.5–11.5)
POTASSIUM SERPL-SCNC: 3.7 MMOL/L (ref 3.5–5.3)
RBC # BLD AUTO: 4.22 X10*6/UL (ref 4.5–5.9)
SODIUM SERPL-SCNC: 140 MMOL/L (ref 136–145)
VIT B12 SERPL-MCNC: 323 PG/ML (ref 211–911)
WBC # BLD AUTO: 12.6 X10*3/UL (ref 4.4–11.3)

## 2023-10-18 PROCEDURE — 1200000002 HC GENERAL ROOM WITH TELEMETRY DAILY

## 2023-10-18 PROCEDURE — 96372 THER/PROPH/DIAG INJ SC/IM: CPT | Performed by: PHYSICIAN ASSISTANT

## 2023-10-18 PROCEDURE — 99233 SBSQ HOSP IP/OBS HIGH 50: CPT | Performed by: INTERNAL MEDICINE

## 2023-10-18 PROCEDURE — 99232 SBSQ HOSP IP/OBS MODERATE 35: CPT | Performed by: NURSE PRACTITIONER

## 2023-10-18 PROCEDURE — 80307 DRUG TEST PRSMV CHEM ANLYZR: CPT | Performed by: PSYCHIATRY & NEUROLOGY

## 2023-10-18 PROCEDURE — 80048 BASIC METABOLIC PNL TOTAL CA: CPT | Performed by: INTERNAL MEDICINE

## 2023-10-18 PROCEDURE — 87899 AGENT NOS ASSAY W/OPTIC: CPT | Mod: CMCLAB,PORLAB | Performed by: INTERNAL MEDICINE

## 2023-10-18 PROCEDURE — 2500000004 HC RX 250 GENERAL PHARMACY W/ HCPCS (ALT 636 FOR OP/ED): Performed by: PHYSICIAN ASSISTANT

## 2023-10-18 PROCEDURE — 36415 COLL VENOUS BLD VENIPUNCTURE: CPT | Performed by: PSYCHIATRY & NEUROLOGY

## 2023-10-18 PROCEDURE — 97161 PT EVAL LOW COMPLEX 20 MIN: CPT | Mod: GP | Performed by: PHYSICAL THERAPIST

## 2023-10-18 PROCEDURE — 83735 ASSAY OF MAGNESIUM: CPT | Performed by: INTERNAL MEDICINE

## 2023-10-18 PROCEDURE — 87449 NOS EACH ORGANISM AG IA: CPT | Mod: CMCLAB,PORLAB | Performed by: INTERNAL MEDICINE

## 2023-10-18 PROCEDURE — 2500000004 HC RX 250 GENERAL PHARMACY W/ HCPCS (ALT 636 FOR OP/ED): Performed by: INTERNAL MEDICINE

## 2023-10-18 PROCEDURE — 82607 VITAMIN B-12: CPT | Performed by: PSYCHIATRY & NEUROLOGY

## 2023-10-18 PROCEDURE — 85025 COMPLETE CBC W/AUTO DIFF WBC: CPT | Performed by: INTERNAL MEDICINE

## 2023-10-18 RX ADMIN — HYDROCORTISONE SODIUM SUCCINATE 50 MG: 100 INJECTION, POWDER, FOR SOLUTION INTRAMUSCULAR; INTRAVENOUS at 06:44

## 2023-10-18 RX ADMIN — PANTOPRAZOLE SODIUM 40 MG: 40 TABLET, DELAYED RELEASE ORAL at 06:44

## 2023-10-18 RX ADMIN — HEPARIN SODIUM 5000 UNITS: 5000 INJECTION INTRAVENOUS; SUBCUTANEOUS at 06:44

## 2023-10-18 RX ADMIN — CEFTRIAXONE SODIUM 2 G: 2 INJECTION, SOLUTION INTRAVENOUS at 20:07

## 2023-10-18 ASSESSMENT — COGNITIVE AND FUNCTIONAL STATUS - GENERAL
HELP NEEDED FOR BATHING: TOTAL
DRESSING REGULAR UPPER BODY CLOTHING: A LOT
WALKING IN HOSPITAL ROOM: TOTAL
CLIMB 3 TO 5 STEPS WITH RAILING: TOTAL
STANDING UP FROM CHAIR USING ARMS: A LOT
MOVING TO AND FROM BED TO CHAIR: TOTAL
CLIMB 3 TO 5 STEPS WITH RAILING: TOTAL
TOILETING: A LOT
STANDING UP FROM CHAIR USING ARMS: A LOT
TOILETING: TOTAL
WALKING IN HOSPITAL ROOM: TOTAL
WALKING IN HOSPITAL ROOM: TOTAL
PERSONAL GROOMING: A LOT
EATING MEALS: A LITTLE
DAILY ACTIVITIY SCORE: 12
HELP NEEDED FOR BATHING: TOTAL
MOBILITY SCORE: 9
STANDING UP FROM CHAIR USING ARMS: A LOT
DRESSING REGULAR LOWER BODY CLOTHING: A LOT
PERSONAL GROOMING: A LOT
TURNING FROM BACK TO SIDE WHILE IN FLAT BAD: A LOT
EATING MEALS: A LITTLE
TURNING FROM BACK TO SIDE WHILE IN FLAT BAD: A LOT
MOBILITY SCORE: 11
CLIMB 3 TO 5 STEPS WITH RAILING: TOTAL
MOVING TO AND FROM BED TO CHAIR: A LOT
MOVING FROM LYING ON BACK TO SITTING ON SIDE OF FLAT BED WITH BEDRAILS: A LITTLE
DAILY ACTIVITIY SCORE: 9
DRESSING REGULAR UPPER BODY CLOTHING: TOTAL
MOVING TO AND FROM BED TO CHAIR: TOTAL
MOBILITY SCORE: 13
DRESSING REGULAR LOWER BODY CLOTHING: TOTAL
MOVING FROM LYING ON BACK TO SITTING ON SIDE OF FLAT BED WITH BEDRAILS: A LOT

## 2023-10-18 ASSESSMENT — PAIN SCALES - GENERAL
PAINLEVEL_OUTOF10: 0 - NO PAIN

## 2023-10-18 ASSESSMENT — PAIN - FUNCTIONAL ASSESSMENT
PAIN_FUNCTIONAL_ASSESSMENT: 0-10
PAIN_FUNCTIONAL_ASSESSMENT: 0-10

## 2023-10-18 NOTE — PROGRESS NOTES
Social work consult placed for positive medical risk screen. SW reviewed pt's chart and communicated with [facility]. No SW needs foreseen at this time. SW signing off; available upon request.    Arnoldo Samuels, MSW, LSW ([id number])   Care Transitions

## 2023-10-18 NOTE — PROGRESS NOTES
Notified by Nichelle Campbell RN [facility]  that pt preference for SNF is [facility]- a referral will be sent    10.19 [facility]- [facility] has been sent to  Case Management for submission.      10.19 @9290 Prec[facility] approved - auth good til 10.23    10.19 @1527 [facility] unable to accept due to not following rules at SNF- pt willing to go to [place]

## 2023-10-18 NOTE — PROGRESS NOTES
I met with pt to discuss PT rec for mod therapy.  Pt had visitor, Konrad Tinoco (cousin) and was agreeable that I can talk in his presence about DC plan.  Pt states he has been at [facility] and would like to go there again.  It is across the street from his home.  LAUREEN Riojas, SURYAN, RT notified of choice.

## 2023-10-18 NOTE — CARE PLAN
Problem: Impaired Mobility   Goal: transfers   Description: Patient will perform all transfers with Min assist x 1   Outcome: Progressing  Goal: gait  Description: Patient will amb 25+ feet with Min assist x 1 and A.D. prn   Outcome: Progressing  Goal: strength  Description: Patient will perform 20+ reps of AROM/AAROM for CHAD LE's to improve safety and functional independence    Outcome: Progressing

## 2023-10-18 NOTE — PROGRESS NOTES
"Hai Laboy is a 78 y.o. male on day 2 of admission presenting with Acute pneumonia.      Subjective   Pt seen and examined this afternoon by myself for first time. Initially seen by Dr. Contreras yesterday. Pt resting in bed sitting upright. No family present. Pt with nasal cannula hanging off ear. Asking for help to fix it. States \"make this thing give me more air. I can't breathe.\" No acute distress noted but using abdominal muscles to help with respirations and slight tachypnea at 22 bpm. Readjusted nasal cannula for patient.    Pt agitated with attempts for examination. States his main concern is his breathing and doesn't know why he needs to see someone here from neurology, though does say he is in the hospital because he had a stroke.     B12 wnl at 323. UDS negative.        Objective     Last Recorded Vitals  Blood pressure 134/64, pulse 63, temperature 36.3 °C (97.4 °F), resp. rate 16, height 1.753 m (5' 9\"), weight 59 kg (130 lb 1.1 oz), SpO2 94 %.    Physical Exam  Constitutional:       General: He is awake.   Neurological:      Mental Status: He is alert.   Psychiatric:         Speech: Speech normal.       Neurological Exam  Mental Status  Awake and alert. Oriented only to person and place. Orientation: Correctly identifies his age, city and hospital location  Unable to give year.   States he is here for \"stroke\". Speech is normal. Language is fluent with no aphasia. Fund of knowledge is abnormal.    Cranial Nerves  CN II-VII grossly intact, limited exam due to poor cooperation.    Motor  Decreased muscle bulk throughout. No fasciculations present. Normal muscle tone. No abnormal involuntary movements.  At least antigravity throughout, poor cooperation limiting full strength testing.    Sensory  Light touch is normal in upper and lower extremities.     Gait    Not tested.      Relevant Results    Scheduled medications  aspirin, 81 mg, oral, Daily  atorvastatin, 10 mg, oral, Daily  azithromycin, 500 mg, oral, " q24h CAITY  cefTRIAXone, 2 g, intravenous, q24h  clopidogrel, 75 mg, oral, Daily  tiotropium, 2 Inhalation, inhalation, Daily   And  fluticasone furoate-vilanteroL, 1 puff, inhalation, Daily  heparin (porcine), 5,000 Units, subcutaneous, q8h  hydrocortisone sodium succinate, 50 mg, intravenous, q8h CAITY  [Held by provider] lisinopril, 40 mg, oral, Daily  pantoprazole, 40 mg, oral, Daily before breakfast   Or  pantoprazole, 40 mg, intravenous, Daily before breakfast  polyethylene glycol, 17 g, oral, Daily  [Held by provider] vibegron, 1 tablet, oral, Daily      Continuous medications     PRN medications  PRN medications: acetaminophen **OR** acetaminophen **OR** acetaminophen, acetaminophen **OR** acetaminophen **OR** acetaminophen, melatonin, oxygen    Results for orders placed or performed during the hospital encounter of 10/16/23 (from the past 24 hour(s))   Lactic acid, arterial, whole blood   Result Value Ref Range    POCT Lactate, Arterial 1.7 0.4 - 2.0 mmol/L   POCT GLUCOSE   Result Value Ref Range    POCT Glucose 123 (H) 74 - 99 mg/dL   Vitamin B12   Result Value Ref Range    Vitamin B12 323 211 - 911 pg/mL   Magnesium   Result Value Ref Range    Magnesium 2.16 1.60 - 2.40 mg/dL   Basic Metabolic Panel   Result Value Ref Range    Glucose 123 (H) 74 - 99 mg/dL    Sodium 140 136 - 145 mmol/L    Potassium 3.7 3.5 - 5.3 mmol/L    Chloride 106 98 - 107 mmol/L    Bicarbonate 28 21 - 32 mmol/L    Anion Gap 10 10 - 20 mmol/L    Urea Nitrogen 42 (H) 6 - 23 mg/dL    Creatinine 1.18 0.50 - 1.30 mg/dL    eGFR 62 >60 mL/min/1.73m*2    Calcium 9.1 8.6 - 10.3 mg/dL   CBC and Auto Differential   Result Value Ref Range    WBC 12.6 (H) 4.4 - 11.3 x10*3/uL    nRBC 0.0 0.0 - 0.0 /100 WBCs    RBC 4.22 (L) 4.50 - 5.90 x10*6/uL    Hemoglobin 12.8 (L) 13.5 - 17.5 g/dL    Hematocrit 38.3 (L) 41.0 - 52.0 %    MCV 91 80 - 100 fL    MCH 30.3 26.0 - 34.0 pg    MCHC 33.4 32.0 - 36.0 g/dL    RDW 14.2 11.5 - 14.5 %    Platelets 198 150 - 450  x10*3/uL    MPV 9.8 7.5 - 11.5 fL    Neutrophils % 86.4 40.0 - 80.0 %    Immature Granulocytes %, Automated 0.7 0.0 - 0.9 %    Lymphocytes % 5.3 13.0 - 44.0 %    Monocytes % 7.5 2.0 - 10.0 %    Eosinophils % 0.0 0.0 - 6.0 %    Basophils % 0.1 0.0 - 2.0 %    Neutrophils Absolute 10.85 (H) 1.60 - 5.50 x10*3/uL    Immature Granulocytes Absolute, Automated 0.09 0.00 - 0.50 x10*3/uL    Lymphocytes Absolute 0.66 (L) 0.80 - 3.00 x10*3/uL    Monocytes Absolute 0.94 (H) 0.05 - 0.80 x10*3/uL    Eosinophils Absolute 0.00 0.00 - 0.40 x10*3/uL    Basophils Absolute 0.01 0.00 - 0.10 x10*3/uL   DRUG SCREEN,URINE   Result Value Ref Range    Amphetamine Screen, Urine Presumptive Negative Presumptive Negative    Barbiturate Screen, Urine Presumptive Negative Presumptive Negative    Benzodiazepines Screen, Urine Presumptive Negative Presumptive Negative    Cannabinoid Screen, Urine Presumptive Negative Presumptive Negative    Cocaine Metabolite Screen, Urine Presumptive Negative Presumptive Negative    Fentanyl Screen, Urine Presumptive Negative Presumptive Negative    Opiate Screen, Urine Presumptive Negative Presumptive Negative    Oxycodone Screen, Urine Presumptive Negative Presumptive Negative    PCP Screen, Urine Presumptive Negative Presumptive Negative     CT head wo IV contrast    Result Date: 10/16/2023  Interpreted By:  Topher Hendricks, STUDY: CT HEAD WO IV CONTRAST;  10/16/2023 8:05 pm   INDICATION: Signs/Symptoms:new confusion.   COMPARISON: CT head 06/07/2023   ACCESSION NUMBER(S): [id number]   ORDERING CLINICIAN: FRANKLIN WELCH   TECHNIQUE: Noncontrast axial CT images of head were obtained with coronal and sagittal reconstructed images.   FINDINGS: BRAIN PARENCHYMA: Mild generalized cerebral volume loss. No evidence of acute large territory infarction or transcortical edema. The deep gray structures are preserved. No mass-effect, midline shift or effacement of cerebral sulci. Confluent periventricular and subcortical  white matter hypodensities, nonspecific but often seen in the setting of chronic microangiopathic disease.   HEMORRHAGE: No acute intracranial hemorrhage.   VENTRICLES and EXTRA-AXIAL SPACES: Prominence of the lateral and 3rd ventricles greater than expected for degree of cerebral volume loss. No abnormal extra-axial fluid collection.   ORBITS: The visualized orbits and globes are within normal limits.   EXTRACRANIAL SOFT TISSUES: Within normal limits.   PARANASAL SINUSES/MASTOIDS: The visualized paranasal sinuses and mastoid air cells are well aerated.   CALVARIUM: No depressed skull fracture.   Brain Injury Guidelines (BIG) CT Values:   Skull fracture: No SDH (subdural hematoma): No EDH (epidural hematoma): No IPH (intraparenchymal hemorrhage): No SAH (subarachnoid hemorrhage): No IVH (intraventricular hemorrhage): No   Reference: Carl JENKINS, Judy RS, Everton M, et al. The BIG (brain injury guidelines) project: defining the management of traumatic brain injury by acute care surgeons. J Trauma Acute Care Surg 2014; 76:965-969.       1. No acute intracranial abnormality identified. 2. Ventricular prominence greater than expected for degree of cerebral volume loss, consider normal pressure hydrocephalus or other hydrocephalus. 3. Chronic white matter changes likely related to small-vessel ischemic disease.       MACRO: None   Signed by: Topher Hendricks 10/16/2023 8:45 PM Dictation workstation:   [id number]    CT angio chest for pulmonary embolism    Result Date: 10/16/2023  Interpreted By:  Topher Hendricks, STUDY: CT ANGIO CHEST FOR PULMONARY EMBOLISM;  10/16/2023 8:05 pm   INDICATION: Signs/Symptoms:SOB, elevated dimer.   COMPARISON: CT chest 03/09/2023   ACCESSION NUMBER(S): [id number]   ORDERING CLINICIAN: TOPHER WANG   TECHNIQUE: Contiguous axial images of the chest were obtained after the intravenous administration of 75 mL Omnipaque 350 contrast using angiographic PE protocol. Coronal and sagittal  reformatted images were reconstructed from the axial data. MIP images were created on an independent workstation and reviewed.   FINDINGS: PULMONARY ARTERIES: Adequate opacification to the level of the segmental arteries. The subsegmental arteries are suboptimally assessed due to mixing artifact and respiratory motion. No filling defect to suggest pulmonary embolus in the visualized pulmonary arteries. The main pulmonary artery is normal in diameter. No CT evidence of right heart strain.   HEART: Normal in size. Moderate to heavy triple-vessel coronary vascular calcifications. Calcifications in the plane of the mitral valve. No significant pericardial effusion.   VESSELS: Normal caliber aorta without dissection. Heavy calcifications of the aortic arch and proximal great vessels as well as the descending thoracic aorta.   MEDIASTINUM AND LYMPH NODES: Visualized thyroid is within normal limits. No enlarged intrathoracic or axillary lymph nodes by imaging criteria. No pneumomediastinum. The esophagus appears within normal limits.   LUNG, AIRWAYS, AND PLEURA: Dependent debris within the distal trachea. Left lower lobe bronchial wall thickening with adjacent patchy and consolidative opacities. There is a background of diffuse centrilobular and paraseptal emphysematous change. Bibasilar scarring. Bandlike scarring in the right apex. No pleural effusion or pneumothorax. Scattered bilateral pleural calcifications most pronounced along the pleural surfaces of the right lower lobe. Findings suggest prior asbestos exposure.   OSSEOUS STRUCTURES: No acute osseous abnormality.   CHEST WALL SOFT TISSUES: No discernible abnormality.   UPPER ABDOMEN/OTHER: Heavy vascular calcifications in the upper abdomen.       1. No evidence of pulmonary embolus to the level of the segmental arteries. Filling defects within the subsegmental arteries can not be entirely excluded. 2. Bronchial wall thickening in the medial left lower lobe with  adjacent patchy and consolidative opacities. Findings are most suggestive of pneumonia or aspiration pneumonitis. Follow-up is recommended to ensure resolution and exclude underlying lesion. 3. Diffuse background of emphysematous changes and pleural calcifications suggestive of prior asbestos exposure. 4. Please see additional findings and discussion as above.   MACRO: None   Signed by: Topher Hendricks 10/16/2023 8:36 PM Dictation workstation:   [id number]    XR chest 1 view    Result Date: 10/16/2023  Interpreted By:  Paco Andrews, STUDY: XR CHEST 1 VIEW   INDICATION: Signs/Symptoms:hypoxia, confusion.   COMPARISON: June 7, 2023   ACCESSION NUMBER(S): [id number]   ORDERING CLINICIAN: FRANKLIN WELCH   FINDINGS: Extensive pleural-based calcifications in the bilateral lungs right worse than left, similar to prior study.   No new consolidation or edema. No evidence of pneumothorax or effusion.       Extensive calcified pleural plaques suggesting asbestos exposure similar to prior study. No evidence of acute intrathoracic abnormality.   Signed by: Paco Andrews 10/16/2023 5:27 PM Dictation workstation:   [id number]           NIH Stroke Scale  1A. Level of Consciousness: Alert, Keenly Responsive  1B. Ask Month and Age: 1 Question Right  1C. Blink Eyes & Squeeze Hands: Performs Both Tasks  2. Best Gaze: Normal  3. Visual: No Visual Loss  4. Facial Palsy: Normal Symmetrical Movements  5A. Motor - Left Arm: No Drift  5B. Motor - Right Arm: No Drift  6A. Motor - Left Leg: Some Effort Against Gravity  6B. Motor - Right Leg: Some Effort Against Gravity  7. Limb Ataxia: Absent  8. Sensory Loss: Normal  9. Best Language: No Aphasia  10. Dysarthria: Normal  11. Extinction and Inattention: No Abnormality  NIH Stroke Scale: 5           [place] Coma Scale  Best Eye Response: Spontaneous  Best Verbal Response: Confused  Best Motor Response: Follows commands  [place] Coma Scale Score: 14                    Assessment/Plan   This  "patient currently has cardiac telemetry ordered; if you would like to modify or discontinue the telemetry order, click here to go to the orders activity to modify/discontinue the order.  Principal Problem:    Acute pneumonia  Active Problems:    COPD (chronic obstructive pulmonary disease) (CMS/HCC)    Hyperlipidemia    Acute hypoxic respiratory failure (CMS/HCC)    Altered mental status    Encephalopathy    Cerebral ventriculomegaly    Sepsis (CMS/HCC)    Occlusion of right vertebral artery    IMPRESSION:  Encephalopathy  ? Improving today, answering more questions and alert but still confused to some questions  Poor cooperation due to \"trouble breathing\"  Hx of CVA  2017, L [facility]  On DAPT and statin PTA, unsure if complaint  Carotid artery disease  Following with Dr. Sal outpatient   On DAPT and statin  Cerebral ventriculomegaly  Ventricular prominence noted on CT, ? Concern for NPH  This seems to be present since at least 2017, not seemingly any worse and likely not contributing to current mental state  Will need further information from patient when able, ? Urinary incontinence and/or gait issues as well as progression/onset of symptoms.   Occlusion of R vertebral artery    RECOMMENDATIONS:  Continue supportive care.   Continue to treat and improve metabolic and infectious processes  Continue antiplatelets and statin daily.   Continue PT/OT.   Follow up outpatient with vascular surgery as planned.     Discussed with patient. Case managed with Dr. Contreras. Will continue to follow.          I spent 45 minutes in the professional and overall care of this patient.      Mandi Umana, APRN-CNP  "

## 2023-10-18 NOTE — PROGRESS NOTES
Physical Therapy    Physical Therapy Evaluation    Patient Name: Hai Laboy  MRN: [id number]  Today's Date: 10/18/2023   Time Calculation  Start Time: 1225  Stop Time: 1237  Time Calculation (min): 12 min    Assessment/Plan   PT Assessment  PT Assessment Results: Decreased strength, Decreased endurance, Impaired balance, Decreased mobility, Decreased coordination, Decreased cognition, Impaired judgement, Decreased safety awareness  Rehab Prognosis: Fair  Evaluation/Treatment Tolerance:  (tolerated fair)  End of Session Communication: Bedside nurse  Assessment Comment:  (Patient requires Mod assist x 1 for most mobility, unable to amb, not safe for home alone at this time.)  End of Session Patient Position: Bed, 3 rail up, Alarm on  IP OR SWING BED PT PLAN  Inpatient or Swing Bed: Inpatient  PT Plan  Treatment/Interventions: Bed mobility, Transfer training, Gait training, Balance training, Strengthening, Endurance training, Therapeutic exercise, Therapeutic activity  PT Plan: Skilled PT  PT Frequency: 3 times per week  PT Discharge Recommendations: Moderate intensity level of continued care    Subjective       General Visit Information:  General  Reason for Referral: Dx: Altered mental status, PNA.  Referred By: Carol  Past Medical History Relevant to Rehab: COPD, CVA, prostate CA  Patient Position Received: Bed, 3 rail up, Alarm on  General Comment:  (Patient seen awake and alert in room 2327, confused)    Home Living:  Home Living  Type of Home:  (Per chart, patient lives alone, has cane. Alexx stated he has FWW also when asked but unclear accuracy of statement)    Prior Level of Function:       Precautions:  Precautions  Precautions Comment:  (falls)    Vital Signs:     Objective     Pain:  Pain Assessment  Pain Assessment:  (difficult to formally assess due to garbled speech and decreased cognition, appeared no pain)    Cognition:  Cognition  Overall Cognitive Status: Impaired (confused, garbled speech,  oriented to self only but difficult to fully assess; poor safety awareness)    General Assessments:  General Observation  General Observation:  (Stood x 3 times EOB, 1 x without AD, twice with FWW; patient having trouble forcusing and following commands for amb/activity, returned to upright position in bed to finish lunch)   Activity Tolerance  Endurance:  (decreased tolerance for activity, mild SOB, unsure if limited activity due to fatigue or just cognition - patinet sitting repeatedly instead of amb)     Strength  Strength Comments:  (not formally assessed due to cognition; CHAD LE quads no buckling noted with WB activity)        Postural Control  Postural Control:  (static sitting balance fair/fair- with mild retro lean; static standing balance poor+ with moderate retro lean)          Functional Assessments:     Bed Mobility  Bed Mobility:  (Mod to Mod/Max assist x 1 supine to/from sit)  Transfers  Transfer:  (sit to stand with Mod/Max assist x 1 without AD, Mod/Min assist x 1 with AD)  Ambulation/Gait Training  Ambulation/Gait Training Performed:  (Attempted x 3 but patient not taking steps despite much verbal and tactile cues to progress LE's)          Extremity/Trunk Assessments:                Outcome Measures:  [facility] Basic Mobility  Turning from your back to your side while in a flat bed without using bedrails: A lot  Moving from lying on your back to sitting on the side of a flat bed without using bedrails: A lot  Moving to and from bed to chair (including a wheelchair): Total  Standing up from a chair using your arms (e.g. wheelchair or bedside chair): A lot  To walk in hospital room: Total  Climbing 3-5 steps with railing: Total  Basic Mobility - Total Score: 9                            Goals:  Encounter Problems       Encounter Problems (Active)       Impaired Mobility        transfers  (Progressing)       Start:  10/18/23    Expected End:  11/01/23       Patient will perform all transfers with Min  assist x 1          gait (Progressing)       Start:  10/18/23    Expected End:  11/01/23       Patient will amb 25+ feet with Min assist x 1 and A.D. prn          strength (Progressing)       Start:  10/18/23    Expected End:  11/01/23       Patient will perform 20+ reps of AROM/AAROM for CHAD LE's to improve safety and functional independence              Pain - Adult            Education Documentation  Precautions, taught by Sri Wallace, PT at 10/18/2023 12:52 PM.  Learner: Patient  Readiness: Acceptance  Method: Explanation  Response: No Evidence of Learning    Mobility Training, taught by Sri Wallace, PT at 10/18/2023 12:52 PM.  Learner: Patient  Readiness: Acceptance  Method: Explanation  Response: No Evidence of Learning    Education Comments  No comments found.

## 2023-10-18 NOTE — CARE PLAN
The patient's goals for the shift include      The clinical goals for the shift include Pt. will remain free from falls and injury throughout the shift.    Over the shift, the patient did not make progress toward the following goals. Barriers to progression include pt.'s confused mental state. Recommendations to address these barriers include reorientation when needed; re-education when available.

## 2023-10-19 LAB
LEGIONELLA AG UR QL: NEGATIVE
S PNEUM AG UR QL: NEGATIVE

## 2023-10-19 PROCEDURE — 2500000002 HC RX 250 W HCPCS SELF ADMINISTERED DRUGS (ALT 637 FOR MEDICARE OP, ALT 636 FOR OP/ED): Performed by: INTERNAL MEDICINE

## 2023-10-19 PROCEDURE — 2500000004 HC RX 250 GENERAL PHARMACY W/ HCPCS (ALT 636 FOR OP/ED): Performed by: INTERNAL MEDICINE

## 2023-10-19 PROCEDURE — 2500000001 HC RX 250 WO HCPCS SELF ADMINISTERED DRUGS (ALT 637 FOR MEDICARE OP): Performed by: PHYSICIAN ASSISTANT

## 2023-10-19 PROCEDURE — 99231 SBSQ HOSP IP/OBS SF/LOW 25: CPT | Performed by: NURSE PRACTITIONER

## 2023-10-19 PROCEDURE — 2500000002 HC RX 250 W HCPCS SELF ADMINISTERED DRUGS (ALT 637 FOR MEDICARE OP, ALT 636 FOR OP/ED): Performed by: PHYSICIAN ASSISTANT

## 2023-10-19 PROCEDURE — 1200000002 HC GENERAL ROOM WITH TELEMETRY DAILY

## 2023-10-19 RX ADMIN — ASPIRIN 81 MG: 81 TABLET, COATED ORAL at 08:22

## 2023-10-19 RX ADMIN — CLOPIDOGREL BISULFATE 75 MG: 75 TABLET ORAL at 08:22

## 2023-10-19 RX ADMIN — TIOTROPIUM BROMIDE INHALATION SPRAY 2 PUFF: 3.12 SPRAY, METERED RESPIRATORY (INHALATION) at 08:22

## 2023-10-19 RX ADMIN — ATORVASTATIN CALCIUM 10 MG: 10 TABLET, FILM COATED ORAL at 21:42

## 2023-10-19 RX ADMIN — Medication 3 MG: at 21:42

## 2023-10-19 RX ADMIN — AZITHROMYCIN 500 MG: 250 TABLET, FILM COATED ORAL at 21:42

## 2023-10-19 RX ADMIN — CEFTRIAXONE SODIUM 2 G: 2 INJECTION, SOLUTION INTRAVENOUS at 21:00

## 2023-10-19 RX ADMIN — FLUTICASONE FUROATE AND VILANTEROL TRIFENATATE 1 PUFF: 100; 25 POWDER RESPIRATORY (INHALATION) at 08:22

## 2023-10-19 RX ADMIN — ACETAMINOPHEN 650 MG: 325 TABLET ORAL at 21:42

## 2023-10-19 ASSESSMENT — PAIN SCALES - GENERAL
PAINLEVEL_OUTOF10: 0 - NO PAIN
PAINLEVEL_OUTOF10: 3
PAINLEVEL_OUTOF10: 0 - NO PAIN

## 2023-10-19 ASSESSMENT — COGNITIVE AND FUNCTIONAL STATUS - GENERAL
CLIMB 3 TO 5 STEPS WITH RAILING: TOTAL
CLIMB 3 TO 5 STEPS WITH RAILING: TOTAL
DRESSING REGULAR UPPER BODY CLOTHING: TOTAL
MOVING TO AND FROM BED TO CHAIR: TOTAL
STANDING UP FROM CHAIR USING ARMS: A LOT
MOVING FROM LYING ON BACK TO SITTING ON SIDE OF FLAT BED WITH BEDRAILS: A LITTLE
DAILY ACTIVITIY SCORE: 8
DAILY ACTIVITIY SCORE: 12
MOBILITY SCORE: 13
DRESSING REGULAR UPPER BODY CLOTHING: A LOT
DRESSING REGULAR LOWER BODY CLOTHING: TOTAL
PERSONAL GROOMING: A LOT
MOVING TO AND FROM BED TO CHAIR: TOTAL
TOILETING: TOTAL
TURNING FROM BACK TO SIDE WHILE IN FLAT BAD: A LITTLE
HELP NEEDED FOR BATHING: TOTAL
HELP NEEDED FOR BATHING: TOTAL
TOILETING: A LOT
WALKING IN HOSPITAL ROOM: TOTAL
PERSONAL GROOMING: TOTAL
WALKING IN HOSPITAL ROOM: TOTAL
DRESSING REGULAR LOWER BODY CLOTHING: A LOT
MOBILITY SCORE: 11
STANDING UP FROM CHAIR USING ARMS: A LOT
EATING MEALS: A LITTLE
EATING MEALS: A LITTLE

## 2023-10-19 ASSESSMENT — PAIN - FUNCTIONAL ASSESSMENT
PAIN_FUNCTIONAL_ASSESSMENT: 0-10

## 2023-10-19 NOTE — PROGRESS NOTES
Hai Laboy is a 78 y.o. male on day 2 of admission presenting with Acute pneumonia.    Subjective   Hai Laboy is a 78 y.o. male with PMH of COPD with no baseline oxygen requirement, prior stroke, HLD, prostate cancer, who presents with altered mental status. Patient unable to provide history due to mental status, history obtained by ED provider and chart review. Per report EMS was called for patient due to weakness. Upon arrival they found him to be confused and hypoxic on RA. At this time patient is not waking to provide history, will wake briefly to state he does not know why he is here or where he is, but does not answer any specific questions. Further history unable to be obtained at this time.      ED course: Afebrile, , RR 18, /91, pulse ox 94% on room air. WBC 16.5, Hgb 16.9. D-dimer 1935. Chemistries remarkable for K 5.7 -> 4.4. Lactate 2.1. Troponin 21. CT head shows ventricular prominence may be NPH vs other hydrocephalus. CT angio chest shows no evidence of acute PE, possible pneumonia vs aspiration pneumonitis, emphysematous changes. Patient            ED Course as of 10/16/23 2232   Mon Oct 16, 2023   1700 WBC(!): 16.5  Concern for sepsis at this time [WJ]   1714 Patient twelve-lead EKG inter by myself shows sinus tachycardia with a ventricular rate of 107, normal [place] interval, normal axis, normal QRS duration, normal QT, no STEMI.  Interpretation is limited due to artifact back. [WJ]   1828 XR chest 1 view  Calcified plaques, similar to prior [NS]   1829 CBC and Auto Differential(!)  Leukocytosis noted. Unsure whether related to infection or potential steroid use. Will look into [NS]   1829 Blood Gas Venous Full Panel(!)  Lactate elevated with normal PH [NS]   1829 Comprehensive Metabolic Panel(!)  Mild hyperkalemia noted, mild hemolysis. Will recheck [NS]   1830 D-dimer, VTE Exclusion(!)  D dimer elevated, will require CT [NS]   2104 CT angio chest for pulmonary embolism  CT without  "evidence of PE but shows concern for pna [NS]       ED Course User Index  [NS] Partha Hodge MD  [WJ] Srinath Kelly DO           Diagnoses as of 10/16/23 2232   Acute pneumonia   Hypoxia   Delirium         Past Medical History  He has a past medical history of Personal history of other infectious and parasitic diseases (11/26/2019), Personal history of other specified conditions (09/27/2019), and Personal history of other specified conditions (09/27/2019).     Surgical History  He has a past surgical history that includes Other surgical history (04/27/2018) and CT angio neck w and wo IV contrast (4/14/2023).     Social History  He reports that he has quit smoking. His smoking use included cigarettes. He has never used smokeless tobacco. He reports that he does not drink alcohol and does not use drugs.     Family History  Family History               Family History   Problem Relation Name Age of Onset    No Known Problems Mother        No Known Problems Father                Allergies  Patient has no known allergies.        10/18: Patient clinically is looking much better today more more contrary but appears to be feeling quite a bit better.  Continue with current care follow labs.    Objective     Physical Exam    Last Recorded Vitals  Blood pressure 158/67, pulse 73, temperature 37.3 °C (99.1 °F), resp. rate 17, height 1.753 m (5' 9\"), weight 59 kg (130 lb 1.1 oz), SpO2 92 %.  Intake/Output last 3 Shifts:  I/O last 3 completed shifts:  In: 1680 (28.5 mL/kg) [P.O.:1180; IV Piggyback:500]  Out: 110 (1.9 mL/kg) [Urine:110 (0.1 mL/kg/hr)]  Weight: 59 kg     Relevant Results    Current Facility-Administered Medications:     acetaminophen (Tylenol) tablet 650 mg, 650 mg, oral, q4h PRN **OR** acetaminophen (Tylenol) oral liquid 650 mg, 650 mg, nasogastric tube, q4h PRN **OR** acetaminophen (Tylenol) suppository 650 mg, 650 mg, rectal, q4h PRN, Augustine Reyes PA-C    acetaminophen (Tylenol) tablet 650 mg, " 650 mg, oral, q4h PRN **OR** acetaminophen (Tylenol) oral liquid 650 mg, 650 mg, oral, q4h PRN **OR** acetaminophen (Tylenol) suppository 650 mg, 650 mg, rectal, q4h PRN, Augustine Reyes PA-C    aspirin EC tablet 81 mg, 81 mg, oral, Daily, Augustine Reyes PA-C, 81 mg at 10/17/23 0922    atorvastatin (Lipitor) tablet 10 mg, 10 mg, oral, Daily, Augustine Reyes PA-C, 10 mg at 10/17/23 2045    azithromycin (Zithromax) tablet 500 mg, 500 mg, oral, q24h CAITY, Lexa Medina MD, 500 mg at 10/17/23 2045    cefTRIAXone (Rocephin) 2 g IV in dextrose 5% 50 mL, 2 g, intravenous, q24h, Lexa Medina MD, Stopped at 10/18/23 2037    clopidogrel (Plavix) tablet 75 mg, 75 mg, oral, Daily, Augustine Reyes PA-C, 75 mg at 10/17/23 0922    tiotropium (Spiriva Respimat) 2.5 mcg/actuation inhaler 2 puff, 2 Inhalation, inhalation, Daily, 2 puff at 10/17/23 0923 **AND** fluticasone furoate-vilanteroL (Breo Ellipta) 100-25 mcg/dose inhaler 1 puff, 1 puff, inhalation, Daily, Augustine Reyes PA-C, 1 puff at 10/17/23 0923    heparin (porcine) injection 5,000 Units, 5,000 Units, subcutaneous, q8h, Augustine Reyes PA-C, 5,000 Units at 10/18/23 0644    [Held by provider] lisinopril tablet 40 mg, 40 mg, oral, Daily, Augustine Reyes PA-C, 40 mg at 10/17/23 0922    melatonin tablet 3 mg, 3 mg, oral, Nightly PRN, Augustine Reyes PA-C    oxygen (O2) therapy, , inhalation, Continuous PRN - O2/gases, Lexa Medina MD, 1 L/min at 10/18/23 1324    pantoprazole (ProtoNix) EC tablet 40 mg, 40 mg, oral, Daily before breakfast, 40 mg at 10/18/23 0644 **OR** pantoprazole (ProtoNix) injection 40 mg, 40 mg, intravenous, Daily before breakfast, Augustine Reyes PA-C, 40 mg at 10/17/23 0615    polyethylene glycol (Glycolax, Miralax) packet 17 g, 17 g, oral, Daily, Augustine Reyes PA-C    [Held by provider] vibegron tablet 75 mg, 1 tablet, oral, Daily, Augustine Reyes PA-C     Labs from the last few days have been reviewed.    Assessment/Plan    Principal Problem:    Acute pneumonia  Active Problems:    COPD (chronic obstructive pulmonary disease) (CMS/HCC)    Hyperlipidemia    Acute hypoxic respiratory failure (CMS/HCC)    Altered mental status    Encephalopathy    Cerebral ventriculomegaly    Sepsis (CMS/HCC)    Occlusion of right vertebral artery    Rocephin 2 g IV daily reordered  Azithromycin 500 mg a day  IV fluid bolus 500 mL  IV Solu-Cortef 50 mg 3 times a day x3 doses  Hold antihypertensive medications  Continue aspirin 81 mg a day  Continue Lipitor 10 mg a day  Continue Plavix 75 mg a day  Spiriva 2 puffs daily  Advair substitute daily  Protonix formulary milligrams daily  MiraLAX 17 mg daily  We will add as needed aerosols  Nasal cannula oxygen wean as able  DVT prophylaxis subcu heparin  Check lactate  Serial exams  Sepsis protocol  Follow cultures and urine studies  Check labs in a.m.  See orders for complete plan       I spent 45 minutes in the professional and overall care of this patient.      Lexa Medina MD

## 2023-10-19 NOTE — NURSING NOTE
"This RN tried to collect patients morning labs after night shift RN reported that patient declined. Patient stated that \" you aint getting shit from me\". This RN educated patient on the need and the purpose and patient stated \" I don't give a shit you aren't doing nothing to me\" .   "

## 2023-10-19 NOTE — PROGRESS NOTES
"Occupational Therapy                 Therapy Communication Note    Patient Name: Hai Laboy  MRN: [id number]  Today's Date: 10/19/2023     Discipline: Occupational Therapy    Patient declined to participate in O.T. eval. at this time., stating \"no thankyou\" , \"not unless you can take me to [place]\"  Will attempt another time.     "

## 2023-10-19 NOTE — PROGRESS NOTES
"Physical Therapy                 Therapy Communication Note    Patient Name: Hai Laboy  MRN: [id number]  Today's Date: 10/20/2023     Discipline: Physical Therapy    Missed Visit Reason:      Missed Time: Attempt    Comment:pt decline treatment. No reason given pt just stated\"no.\"  "

## 2023-10-19 NOTE — PROGRESS NOTES
"Hai Laboy is a 78 y.o. male on day 3 of admission presenting with Acute pneumonia.      Subjective   Pt seen and examined again this afternoon. Resting in bed. No acute distress. No family present at time of evaluation. Pt slightly agitated to questioning today. States \"get me out of here.\" Able to correctly state year and age today. Unable to name president. States \"[place]\" and \"[facility]\" for location.        Objective     Last Recorded Vitals  Blood pressure 116/75, pulse 73, temperature 36.9 °C (98.4 °F), resp. rate 18, height 1.753 m (5' 9\"), weight 59 kg (130 lb 1.1 oz), SpO2 98 %.    Physical Exam  Vitals and nursing note reviewed.   Constitutional:       General: He is awake.   Neurological:      Mental Status: He is alert.   Psychiatric:         Speech: Speech normal.       Neurological Exam  Mental Status  Awake and alert. Oriented only to person and place. Orientation: Correctly identifies his age and year  Incorrectly identifies city. Aware he is in hospital.   Unable to state president or why he is in hospital. Speech is normal. Language is fluent with no aphasia. Fund of knowledge is abnormal.    Cranial Nerves  CN II-VII grossly intact, limited exam due to poor cooperation.    Motor  Decreased muscle bulk throughout. No fasciculations present. Normal muscle tone. No abnormal involuntary movements.  At least antigravity throughout, poor cooperation limiting full strength testing.    Sensory  Light touch is normal in upper and lower extremities.     Gait    Not tested.    Relevant Results        NIH Stroke Scale  1A. Level of Consciousness: Alert, Keenly Responsive  1B. Ask Month and Age: 1 Question Right  1C. Blink Eyes & Squeeze Hands: Performs Both Tasks  2. Best Gaze: Normal  3. Visual: No Visual Loss  4. Facial Palsy: Normal Symmetrical Movements  5A. Motor - Left Arm: No Drift  5B. Motor - Right Arm: No Drift  6A. Motor - Left Leg: Some Effort Against Gravity  6B. Motor - Right " Leg: Some Effort Against Gravity  7. Limb Ataxia: Absent  8. Sensory Loss: Normal  9. Best Language: No Aphasia  10. Dysarthria: Normal  11. Extinction and Inattention: No Abnormality  NIH Stroke Scale: 5           [place] Coma Scale  Best Eye Response: Spontaneous  Best Verbal Response: Confused  Best Motor Response: Follows commands  [place] Coma Scale Score: 14                  Assessment/Plan   This patient currently has cardiac telemetry ordered; if you would like to modify or discontinue the telemetry order, click here to go to the orders activity to modify/discontinue the order.  Principal Problem:    Acute pneumonia  Active Problems:    COPD (chronic obstructive pulmonary disease) (CMS/HCC)    Hyperlipidemia    Acute hypoxic respiratory failure (CMS/HCC)    Altered mental status    Encephalopathy    Cerebral ventriculomegaly    Sepsis (CMS/HCC)    Occlusion of right vertebral artery    IMPRESSION:  Encephalopathy  Answering questions and alert but still confused  Limited cooperation  Hx of CVA  2017, L [place]  On DAPT and statin PTA, unsure if complaint  Carotid artery disease  Following with Dr. Sal outpatient   On DAPT and statin  Cerebral ventriculomegaly  Ventricular prominence noted on CT, ? Concern for NPH  This seems to be present since at least 2017, not seemingly any worse and likely not contributing to current mental state  Does have gait issues currently but unsure if related to mental state.  Occlusion of R vertebral artery    Still unsure of baseline at this time.      RECOMMENDATIONS:  Continue supportive care.   Continue to treat and improve metabolic and infectious processes  Continue antiplatelets and statin daily.   Continue PT/OT.   Follow up outpatient with vascular surgery as planned.   Consider outpatient consult to Dr. Carl Casas with neurosurgery for NPH evaluation.     Discussed with patient. Case managed with Dr. Contreras. Will sign off.    Please note, there is no inpatient neurology  coverage starting tomorrow 10/20 until 10/30/23 at 0700 when Dr. Lux resumes coverage.          I spent 30 minutes in the professional and overall care of this patient.      TIGRE Gilmore-CNP

## 2023-10-19 NOTE — PROGRESS NOTES
"Physical Therapy                 Therapy Communication Note    Patient Name: Hai Laboy  MRN: [id number]  Today's Date: 10/19/2023     Discipline: Physical Therapy    Missed Visit Reason:      Missed Time: Attempt    Comment: pt declined treatment. He did not give a reason just stated \"No.\"  "

## 2023-10-20 VITALS
WEIGHT: 130.07 LBS | TEMPERATURE: 98.7 F | HEIGHT: 69 IN | DIASTOLIC BLOOD PRESSURE: 56 MMHG | HEART RATE: 67 BPM | OXYGEN SATURATION: 92 % | BODY MASS INDEX: 19.27 KG/M2 | RESPIRATION RATE: 16 BRPM | SYSTOLIC BLOOD PRESSURE: 123 MMHG

## 2023-10-20 LAB
ANION GAP SERPL CALC-SCNC: 8 MMOL/L (ref 10–20)
BASOPHILS # BLD AUTO: 0.01 X10*3/UL (ref 0–0.1)
BASOPHILS NFR BLD AUTO: 0.2 %
BUN SERPL-MCNC: 26 MG/DL (ref 6–23)
CALCIUM SERPL-MCNC: 8.7 MG/DL (ref 8.6–10.3)
CHLORIDE SERPL-SCNC: 107 MMOL/L (ref 98–107)
CO2 SERPL-SCNC: 31 MMOL/L (ref 21–32)
CREAT SERPL-MCNC: 0.83 MG/DL (ref 0.5–1.3)
EOSINOPHIL # BLD AUTO: 0.16 X10*3/UL (ref 0–0.4)
EOSINOPHIL NFR BLD AUTO: 3.2 %
ERYTHROCYTE [DISTWIDTH] IN BLOOD BY AUTOMATED COUNT: 14.1 % (ref 11.5–14.5)
GFR SERPL CREATININE-BSD FRML MDRD: 88 ML/MIN/1.73M*2
GLUCOSE SERPL-MCNC: 124 MG/DL (ref 74–99)
HCT VFR BLD AUTO: 35.3 % (ref 41–52)
HGB BLD-MCNC: 12.1 G/DL (ref 13.5–17.5)
IMM GRANULOCYTES # BLD AUTO: 0.06 X10*3/UL (ref 0–0.5)
IMM GRANULOCYTES NFR BLD AUTO: 1.2 % (ref 0–0.9)
LYMPHOCYTES # BLD AUTO: 0.75 X10*3/UL (ref 0.8–3)
LYMPHOCYTES NFR BLD AUTO: 15 %
MAGNESIUM SERPL-MCNC: 2.03 MG/DL (ref 1.6–2.4)
MCH RBC QN AUTO: 31.2 PG (ref 26–34)
MCHC RBC AUTO-ENTMCNC: 34.3 G/DL (ref 32–36)
MCV RBC AUTO: 91 FL (ref 80–100)
MONOCYTES # BLD AUTO: 0.68 X10*3/UL (ref 0.05–0.8)
MONOCYTES NFR BLD AUTO: 13.6 %
NEUTROPHILS # BLD AUTO: 3.33 X10*3/UL (ref 1.6–5.5)
NEUTROPHILS NFR BLD AUTO: 66.8 %
NRBC BLD-RTO: 0 /100 WBCS (ref 0–0)
PLATELET # BLD AUTO: 185 X10*3/UL (ref 150–450)
PMV BLD AUTO: 10.3 FL (ref 7.5–11.5)
POTASSIUM SERPL-SCNC: 4.4 MMOL/L (ref 3.5–5.3)
RBC # BLD AUTO: 3.88 X10*6/UL (ref 4.5–5.9)
SODIUM SERPL-SCNC: 142 MMOL/L (ref 136–145)
WBC # BLD AUTO: 5 X10*3/UL (ref 4.4–11.3)

## 2023-10-20 PROCEDURE — 99239 HOSP IP/OBS DSCHRG MGMT >30: CPT | Performed by: INTERNAL MEDICINE

## 2023-10-20 PROCEDURE — 80048 BASIC METABOLIC PNL TOTAL CA: CPT | Performed by: INTERNAL MEDICINE

## 2023-10-20 PROCEDURE — 85025 COMPLETE CBC W/AUTO DIFF WBC: CPT | Performed by: INTERNAL MEDICINE

## 2023-10-20 PROCEDURE — 94761 N-INVAS EAR/PLS OXIMETRY MLT: CPT

## 2023-10-20 PROCEDURE — 83735 ASSAY OF MAGNESIUM: CPT | Performed by: INTERNAL MEDICINE

## 2023-10-20 PROCEDURE — 2500000004 HC RX 250 GENERAL PHARMACY W/ HCPCS (ALT 636 FOR OP/ED): Performed by: PHYSICIAN ASSISTANT

## 2023-10-20 PROCEDURE — 36415 COLL VENOUS BLD VENIPUNCTURE: CPT | Performed by: INTERNAL MEDICINE

## 2023-10-20 PROCEDURE — 2500000001 HC RX 250 WO HCPCS SELF ADMINISTERED DRUGS (ALT 637 FOR MEDICARE OP): Performed by: PHYSICIAN ASSISTANT

## 2023-10-20 RX ORDER — CEFDINIR 300 MG/1
300 CAPSULE ORAL 2 TIMES DAILY
Status: DISCONTINUED | OUTPATIENT
Start: 2023-10-20 | End: 2023-10-20 | Stop reason: HOSPADM

## 2023-10-20 RX ORDER — CEFDINIR 300 MG/1
300 CAPSULE ORAL 2 TIMES DAILY
Qty: 8 CAPSULE | Refills: 0 | Status: SHIPPED | OUTPATIENT
Start: 2023-10-20 | End: 2023-10-24

## 2023-10-20 RX ORDER — POLYETHYLENE GLYCOL 3350 17 G/17G
17 POWDER, FOR SOLUTION ORAL DAILY
Qty: 20 PACKET | Refills: 1 | Status: SHIPPED | OUTPATIENT
Start: 2023-10-21

## 2023-10-20 RX ORDER — SODIUM CHLORIDE 0.9 % (FLUSH) 0.9 %
SYRINGE (ML) INJECTION
Status: COMPLETED
Start: 2023-10-20 | End: 2023-10-20

## 2023-10-20 RX ORDER — AZITHROMYCIN 500 MG/1
500 TABLET, FILM COATED ORAL
Qty: 3 TABLET | Refills: 0 | Status: SHIPPED | OUTPATIENT
Start: 2023-10-20 | End: 2023-10-23

## 2023-10-20 RX ADMIN — TIOTROPIUM BROMIDE INHALATION SPRAY 2 PUFF: 3.12 SPRAY, METERED RESPIRATORY (INHALATION) at 09:03

## 2023-10-20 RX ADMIN — FLUTICASONE FUROATE AND VILANTEROL TRIFENATATE 1 PUFF: 100; 25 POWDER RESPIRATORY (INHALATION) at 09:06

## 2023-10-20 RX ADMIN — ATORVASTATIN CALCIUM 10 MG: 10 TABLET, FILM COATED ORAL at 09:03

## 2023-10-20 RX ADMIN — POLYETHYLENE GLYCOL 3350 17 G: 17 POWDER, FOR SOLUTION ORAL at 09:03

## 2023-10-20 RX ADMIN — CLOPIDOGREL BISULFATE 75 MG: 75 TABLET ORAL at 09:03

## 2023-10-20 RX ADMIN — ASPIRIN 81 MG: 81 TABLET, COATED ORAL at 09:03

## 2023-10-20 RX ADMIN — Medication 10 ML: at 07:11

## 2023-10-20 ASSESSMENT — COGNITIVE AND FUNCTIONAL STATUS - GENERAL
MOBILITY SCORE: 11
STANDING UP FROM CHAIR USING ARMS: A LOT
EATING MEALS: A LITTLE
DRESSING REGULAR LOWER BODY CLOTHING: TOTAL
MOVING TO AND FROM BED TO CHAIR: TOTAL
CLIMB 3 TO 5 STEPS WITH RAILING: TOTAL
DAILY ACTIVITIY SCORE: 9
TURNING FROM BACK TO SIDE WHILE IN FLAT BAD: A LITTLE
HELP NEEDED FOR BATHING: TOTAL
MOVING FROM LYING ON BACK TO SITTING ON SIDE OF FLAT BED WITH BEDRAILS: A LITTLE
TOILETING: TOTAL
WALKING IN HOSPITAL ROOM: TOTAL
PERSONAL GROOMING: A LOT
DRESSING REGULAR UPPER BODY CLOTHING: TOTAL

## 2023-10-20 ASSESSMENT — PAIN SCALES - GENERAL: PAINLEVEL_OUTOF10: 0 - NO PAIN

## 2023-10-20 NOTE — DOCUMENTATION CLARIFICATION NOTE
"    PATIENT:               RUSS KHAN  ACCT #:                  [id number]  MRN:                       [id number]  :                       1945  ADMIT DATE:       10/16/2023 3:43 PM  DISCH DATE:  RESPONDING PROVIDER #:        [id number]          PROVIDER RESPONSE TEXT:    Severe Protein Calorie Malnutrition    CDI QUERY TEXT:    [facility]_Nutrition Diagnosis    Instruction:    Based on your assessment of the patient and the clinical information, please provide the requested documentation by clicking on the appropriate radio button and enter any additional information if prompted.    Question: Please further clarify this patient nutritional status as    When answering this query, please exercise your independent professional judgment. The fact that a question is being asked, does not imply that any particular answer is desired or expected.    The patient's clinical indicators include:  Clinical Information: Patient admitted with sepsis with COPD, pneumonia, acute resp failure, and AMS    Clinical Indicators: Per 10/17 nutrition consult, \"BMI (Calculated): 19.2...Malnutrition Diagnosis: Severe malnutrition related to starvation. As Evidenced by: severe muscle and fat loss, 19% weight loss indicated x 5 months, intake <75% of estimated energy needs > 1 month.\"    Treatment: nutrition consult, Ensure plus high protein BID    Risk Factors: 78yom admitted with sepsis with COPD, pneumonia, acute resp failure, and AMS  Options provided:  -- Severe Protein Calorie Malnutrition  -- Other - I will add my own diagnosis  -- Refer to Clinical Documentation Reviewer    Query created by: Rocio Shannon on 10/18/2023 7:52 AM      Electronically signed by:  GALA LAYNE MD 10/20/2023 7:25 AM          "

## 2023-10-20 NOTE — PROGRESS NOTES
"Physical Therapy                 Therapy Communication Note    Patient Name: Hai Laboy  MRN: [id number]  Today's Date: 10/20/2023     Discipline: Physical Therapy    Missed Visit Reason:      Missed Time: Attempt pt declined treatment. He stated \" I am fine I don't need therapy.\"  \" I need some questions answered.'    Comment:  "

## 2023-10-20 NOTE — PROGRESS NOTES
Occupational Therapy                 Therapy Communication Note    Patient Name: Hai Laboy  MRN: [id number]  Today's Date: 10/20/2023     Discipline: Occupational Therapy    Missed Visit Reason:      Missed Time: Attempt    Comment: Pt refusing OT Eval  this date .

## 2023-10-20 NOTE — CARE PLAN
Problem: Skin  Goal: Decreased wound size/increased tissue granulation at next dressing change  Outcome: Progressing  Goal: Participates in plan/prevention/treatment measures  Outcome: Progressing  Goal: Prevent/manage excess moisture  Outcome: Progressing  Goal: Prevent/minimize sheer/friction injuries  Outcome: Progressing  Goal: Promote/optimize nutrition  Outcome: Progressing  Goal: Promote skin healing  Outcome: Progressing     Problem: Nutrition  Goal: Oral intake greater than 50%  Outcome: Progressing  Goal: Consume prescribed supplement  Outcome: Progressing  Goal: Lab values WNL  Outcome: Progressing  Goal: Promote healing  Outcome: Progressing  Goal: Maintain stable weight  Outcome: Progressing     Problem: Diabetes  Goal: Achieve decreasing blood glucose levels by end of shift  Outcome: Progressing  Goal: Increase stability of blood glucose readings by end of shift  Outcome: Progressing  Goal: Decrease in ketones present in urine by end of shift  Outcome: Progressing  Goal: Maintain electrolyte levels within acceptable range throughout shift  Outcome: Progressing  Goal: Maintain glucose levels >70mg/dl to <250mg/dl throughout shift  Outcome: Progressing  Goal: No changes in neurological exam by end of shift  Outcome: Progressing  Goal: Learn about and adhere to nutrition recommendations by end of shift  Outcome: Progressing  Goal: Vital signs within normal range for age by end of shift  Outcome: Progressing  Goal: Increase self care and/or family involovement by end of shift  Outcome: Progressing  Goal: Receive DSME education by end of shift  Outcome: Progressing     Problem: Safety - Adult  Goal: Free from fall injury  Outcome: Progressing     Problem: Pain - Adult  Goal: Verbalizes/displays adequate comfort level or baseline comfort level  Outcome: Progressing     Problem: Fall/Injury  Goal: Not fall by end of shift  Outcome: Progressing  Goal: Be free from injury by end of the shift  Outcome:  Progressing  Goal: Verbalize understanding of personal risk factors for fall in the hospital  Outcome: Progressing  Goal: Verbalize understanding of risk factor reduction measures to prevent injury from fall in the home  Outcome: Progressing  Goal: Use assistive devices by end of the shift  Outcome: Progressing  Goal: Pace activities to prevent fatigue by end of the shift  Outcome: Progressing   The patient's goals for the shift include      The clinical goals for the shift include Pt will remain ree from falls and injury throughout this shift    Over the shift, the patient did not make progress toward the following goals. Barriers to progression include forgetfullness. Recommendations to address these barriers include reminding the patient often.

## 2023-10-20 NOTE — PROGRESS NOTES
Home O2 Evaluation:    SpO2 on room air at rest:     92%    SpO2 on room air with exertion:     91%    SpO2 on oxygen at rest:     %    SpO2 on oxygen with exertion:     %    SpO2 on 4L/min O2 with exertion:    %    Ambulation distance:     50 ft    Recommended O2 device: room air    Recommended O2 L/min: room air    Recommended FiO2 %:

## 2023-10-20 NOTE — DISCHARGE SUMMARY
Discharge Diagnosis  Acute pneumonia    Principal Problem:    Acute pneumonia  Active Problems:    COPD (chronic obstructive pulmonary disease) (CMS/HCC)    Hyperlipidemia    Acute hypoxic respiratory failure (CMS/HCC)    Altered mental status    Encephalopathy    Cerebral ventriculomegaly    Sepsis (CMS/HCC)    Occlusion of right vertebral artery    Issues Requiring Follow-Up  Outpatient follow-up for enlarged ventricles in the brain  Low suspicion of normal pressure hydrocephalus but will need to be reevaluated by neurology and perhaps neurosurgery.  Discharged on antibiotics PT and OT.    Discharge Meds     Your medication list        START taking these medications        Instructions Last Dose Given Next Dose Due   azithromycin 500 mg tablet  Commonly known as: Zithromax      Take 1 tablet (500 mg) by mouth once every 24 hours for 3 doses.       cefdinir 300 mg capsule  Commonly known as: Omnicef      Take 1 capsule (300 mg) by mouth 2 times a day for 8 doses.       polyethylene glycol packet  Commonly known as: Glycolax, Miralax  Start taking on: October 21, 2023      Take 17 g by mouth once daily. Do not start before October 21, 2023.              CONTINUE taking these medications        Instructions Last Dose Given Next Dose Due   albuterol 90 mcg/actuation inhaler           albuterol 2.5 mg /3 mL (0.083 %) nebulizer solution           aspirin 81 mg EC tablet           atorvastatin 10 mg tablet  Commonly known as: Lipitor           clopidogrel 75 mg tablet  Commonly known as: Plavix      TAKE 1 TABLET BY MOUTH ONCE DAILY.       Gemtesa 75 mg tablet  Generic drug: vibegron      Take 1 tablet (75 mg) by mouth once daily.       lisinopril 40 mg tablet           multivitamin tablet           Trelegy Ellipta 100-62.5-25 mcg blister with device  Generic drug: fluticasone-umeclidin-vilanter                     Where to Get Your Medications        These medications were sent to [facility]/pharmacy #[id number] - [place], [place] - [address] [facility] [address] [facility] [address] [facility]  [address] [facility] [address]      Phone: [phone]   azithromycin 500 mg tablet  cefdinir 300 mg capsule  polyethylene glycol packet         Test Results Pending At Discharge  Pending Labs       Order Current Status    Blood Culture Preliminary result    Blood Culture Preliminary result    Blood Culture Preliminary result            Hospital Course   Hai Laboy is a 78 y.o. male with PMH of COPD with no baseline oxygen requirement, prior stroke, HLD, prostate cancer, who presents with altered mental status. Patient unable to provide history due to mental status, history obtained by ED provider and chart review. Per report EMS was called for patient due to weakness. Upon arrival they found him to be confused and hypoxic on RA. At this time patient is not waking to provide history, will wake briefly to state he does not know why he is here or where he is, but does not answer any specific questions. Further history unable to be obtained at this time.      ED course: Afebrile, , RR 18, /91, pulse ox 94% on room air. WBC 16.5, Hgb 16.9. D-dimer 1935. Chemistries remarkable for K 5.7 -> 4.4. Lactate 2.1. Troponin 21. CT head shows ventricular prominence may be NPH vs other hydrocephalus. CT angio chest shows no evidence of acute PE, possible pneumonia vs aspiration pneumonitis, emphysematous changes. Patient            ED Course as of 10/16/23 2232   Mon Oct 16, 2023   1700 WBC(!): 16.5  Concern for sepsis at this time [WJ]   1714 Patient twelve-lead EKG inter by myself shows sinus tachycardia with a ventricular rate of 107, normal OR interval, normal axis, normal QRS duration, normal QT, no STEMI.  Interpretation is limited due to artifact back. [WJ]   1828 XR chest 1 view  Calcified plaques, similar to prior [NS]   1829 CBC and Auto Differential(!)  Leukocytosis noted. Unsure whether related to infection or potential steroid use. Will look into [NS]    1829 Blood Gas Venous Full Panel(!)  Lactate elevated with normal PH [NS]   1829 Comprehensive Metabolic Panel(!)  Mild hyperkalemia noted, mild hemolysis. Will recheck [NS]   1830 D-dimer, VTE Exclusion(!)  D dimer elevated, will require CT [NS]   2104 CT angio chest for pulmonary embolism  CT without evidence of PE but shows concern for pna [NS]       ED Course User Index  [NS] Partha Hodge MD  [WJ] Srinath Kelly DO           Diagnoses as of 10/16/23 2232   Acute pneumonia   Hypoxia   Delirium         Past Medical History  He has a past medical history of Personal history of other infectious and parasitic diseases (11/26/2019), Personal history of other specified conditions (09/27/2019), and Personal history of other specified conditions (09/27/2019).     Surgical History  He has a past surgical history that includes Other surgical history (04/27/2018) and CT angio neck w and wo IV contrast (4/14/2023).     Social History  He reports that he has quit smoking. His smoking use included cigarettes. He has never used smokeless tobacco. He reports that he does not drink alcohol and does not use drugs.     Family History  Family History               Family History   Problem Relation Name Age of Onset    No Known Problems Mother        No Known Problems Father                Allergies  Patient has no known allergies.           10/18: Patient clinically is looking much better today more more contrary but appears to be feeling quite a bit better.  Continue with current care follow labs.     10/19: Patient with generous ventricular size doubt symptomatic hydrocephalus but can have outpatient follow-up with neurosurgery.  Vertebral artery occlusion follow-up with vascular surgery doubt any intervention.  We will treat underlying infectious process causing some encephalopathy on top of dementia.  Working on discharge planning to ECF continue antibiotics clinically patient is doing better.     10/20: Patient  is confused slightly but understands he is going to ECF for rehab.  Will discharge to ECF today.     Patient and/or family are to be given a copy of their discharge profile as well as discharge medications sheet prior to leaving the hospital. Please review these sheets for more concise discharge information and instructions.     35 minutes spent in the care of this patient.    Pertinent Physical Exam At Time of Discharge  Physical Exam    Outpatient Follow-Up  Future Appointments   Date Time Provider Department Center   10/24/2023  3:00 PM James Sal DO [id number]   12/8/2023 11:00 AM Damian Belle MD [id number] Bud Medina MD

## 2023-10-20 NOTE — PROGRESS NOTES
Hai Laboy is a 78 y.o. male on day 3 of admission presenting with Acute pneumonia.    Subjective   Hai Laboy is a 78 y.o. male with PMH of COPD with no baseline oxygen requirement, prior stroke, HLD, prostate cancer, who presents with altered mental status. Patient unable to provide history due to mental status, history obtained by ED provider and chart review. Per report EMS was called for patient due to weakness. Upon arrival they found him to be confused and hypoxic on RA. At this time patient is not waking to provide history, will wake briefly to state he does not know why he is here or where he is, but does not answer any specific questions. Further history unable to be obtained at this time.      ED course: Afebrile, , RR 18, /91, pulse ox 94% on room air. WBC 16.5, Hgb 16.9. D-dimer 1935. Chemistries remarkable for K 5.7 -> 4.4. Lactate 2.1. Troponin 21. CT head shows ventricular prominence may be NPH vs other hydrocephalus. CT angio chest shows no evidence of acute PE, possible pneumonia vs aspiration pneumonitis, emphysematous changes. Patient            ED Course as of 10/16/23 2232   Mon Oct 16, 2023   1700 WBC(!): 16.5  Concern for sepsis at this time [WJ]   1714 Patient twelve-lead EKG inter by myself shows sinus tachycardia with a ventricular rate of 107, normal CT interval, normal axis, normal QRS duration, normal QT, no STEMI.  Interpretation is limited due to artifact back. [WJ]   1828 XR chest 1 view  Calcified plaques, similar to prior [NS]   1829 CBC and Auto Differential(!)  Leukocytosis noted. Unsure whether related to infection or potential steroid use. Will look into [NS]   1829 Blood Gas Venous Full Panel(!)  Lactate elevated with normal PH [NS]   1829 Comprehensive Metabolic Panel(!)  Mild hyperkalemia noted, mild hemolysis. Will recheck [NS]   1830 D-dimer, VTE Exclusion(!)  D dimer elevated, will require CT [NS]   2104 CT angio chest for pulmonary embolism  CT without  "evidence of PE but shows concern for pna [NS]       ED Course User Index  [NS] Partha Hodge MD  [WJ] Srinath Kelly DO           Diagnoses as of 10/16/23 2232   Acute pneumonia   Hypoxia   Delirium         Past Medical History  He has a past medical history of Personal history of other infectious and parasitic diseases (11/26/2019), Personal history of other specified conditions (09/27/2019), and Personal history of other specified conditions (09/27/2019).     Surgical History  He has a past surgical history that includes Other surgical history (04/27/2018) and CT angio neck w and wo IV contrast (4/14/2023).     Social History  He reports that he has quit smoking. His smoking use included cigarettes. He has never used smokeless tobacco. He reports that he does not drink alcohol and does not use drugs.     Family History  Family History               Family History   Problem Relation Name Age of Onset    No Known Problems Mother        No Known Problems Father                Allergies  Patient has no known allergies.        10/18: Patient clinically is looking much better today more more contrary but appears to be feeling quite a bit better.  Continue with current care follow labs.    10/19: Patient with generous ventricular size doubt symptomatic hydrocephalus but can have outpatient follow-up with neurosurgery.  Vertebral artery occlusion follow-up with vascular surgery doubt any intervention.  We will treat underlying infectious process causing some encephalopathy on top of dementia.  Working on discharge planning to ECF continue antibiotics clinically patient is doing better.    Objective     Physical Exam    Last Recorded Vitals  Blood pressure 128/72, pulse 66, temperature 36.9 °C (98.4 °F), temperature source Temporal, resp. rate 17, height 1.753 m (5' 9\"), weight 59 kg (130 lb 1.1 oz), SpO2 92 %.  Intake/Output last 3 Shifts:  I/O last 3 completed shifts:  In: 1560 (26.4 mL/kg) [P.O.:1460; IV " Piggyback:100]  Out: 310 (5.3 mL/kg) [Urine:310 (0.1 mL/kg/hr)]  Weight: 59 kg     Relevant Results    Current Facility-Administered Medications:     acetaminophen (Tylenol) tablet 650 mg, 650 mg, oral, q4h PRN **OR** acetaminophen (Tylenol) oral liquid 650 mg, 650 mg, nasogastric tube, q4h PRN **OR** acetaminophen (Tylenol) suppository 650 mg, 650 mg, rectal, q4h PRN, Augustine Reyes PA-C    acetaminophen (Tylenol) tablet 650 mg, 650 mg, oral, q4h PRN, 650 mg at 10/19/23 2142 **OR** acetaminophen (Tylenol) oral liquid 650 mg, 650 mg, oral, q4h PRN **OR** acetaminophen (Tylenol) suppository 650 mg, 650 mg, rectal, q4h PRN, Augustine Reyes PA-C    aspirin EC tablet 81 mg, 81 mg, oral, Daily, Augustine Reyes PA-C, 81 mg at 10/19/23 0822    atorvastatin (Lipitor) tablet 10 mg, 10 mg, oral, Daily, Augustine Reyes PA-C, 10 mg at 10/19/23 2142    azithromycin (Zithromax) tablet 500 mg, 500 mg, oral, q24h CAITY, Lexa Medina MD, 500 mg at 10/19/23 2142    cefTRIAXone (Rocephin) 2 g IV in dextrose 5% 50 mL, 2 g, intravenous, q24h, Lexa Medina MD, Last Rate: 100 mL/hr at 10/19/23 2100, 2 g at 10/19/23 2100    clopidogrel (Plavix) tablet 75 mg, 75 mg, oral, Daily, Augustine Reyes PA-C, 75 mg at 10/19/23 0822    tiotropium (Spiriva Respimat) 2.5 mcg/actuation inhaler 2 puff, 2 Inhalation, inhalation, Daily, 2 puff at 10/19/23 0822 **AND** fluticasone furoate-vilanteroL (Breo Ellipta) 100-25 mcg/dose inhaler 1 puff, 1 puff, inhalation, Daily, Augustine Reyse PA-C, 1 puff at 10/19/23 0822    heparin (porcine) injection 5,000 Units, 5,000 Units, subcutaneous, q8h, Augustine Reyes PA-C, 5,000 Units at 10/18/23 0644    [Held by provider] lisinopril tablet 40 mg, 40 mg, oral, Daily, Augustine Reyes PA-C, 40 mg at 10/17/23 0922    melatonin tablet 3 mg, 3 mg, oral, Nightly PRN, Augustine Reyes PA-C, 3 mg at 10/19/23 2142    oxygen (O2) therapy, , inhalation, Continuous PRN - O2/gases, Lexa Medina MD, 2 L/min at  10/19/23 0831    pantoprazole (ProtoNix) EC tablet 40 mg, 40 mg, oral, Daily before breakfast, 40 mg at 10/18/23 0644 **OR** pantoprazole (ProtoNix) injection 40 mg, 40 mg, intravenous, Daily before breakfast, Augustine Reyes PA-C, 40 mg at 10/17/23 0615    polyethylene glycol (Glycolax, Miralax) packet 17 g, 17 g, oral, Daily, Augustine Reyes PA-C    [Held by provider] vibegron tablet 75 mg, 1 tablet, oral, Daily, Augustine Reyes PA-C     Labs from the last few days have been reviewed.    Assessment/Plan   Principal Problem:    Acute pneumonia  Active Problems:    COPD (chronic obstructive pulmonary disease) (CMS/[facility])    Hyperlipidemia    Acute hypoxic respiratory failure (CMS/HCC)    Altered mental status    Encephalopathy    Cerebral ventriculomegaly    Sepsis (CMS/HCC)    Occlusion of right vertebral artery  DVT prophylaxis      Rocephin 2 g IV daily reordered  Azithromycin 500 mg a day  Hold antihypertensive medications  Continue aspirin 81 mg a day  Continue Lipitor 10 mg a day  Continue Plavix 75 mg a day  Spiriva 2 puffs daily  Advair substitute daily  Protonix formulary milligrams daily  MiraLAX 17 mg daily  We will add as needed aerosols  Nasal cannula oxygen wean as able  Sepsis protocol  Follow cultures and urine studies  Discharge planning to ECF  DVT prophylaxis subcu heparin  Check labs in a.m.  See orders for complete plan       I spent 45 minutes in the professional and overall care of this patient.      Lexa Medina MD

## 2023-10-20 NOTE — PROGRESS NOTES
Hai Laboy is a 78 y.o. male on day 4 of admission presenting with Acute pneumonia.    Subjective   Hai Laboy is a 78 y.o. male with PMH of COPD with no baseline oxygen requirement, prior stroke, HLD, prostate cancer, who presents with altered mental status. Patient unable to provide history due to mental status, history obtained by ED provider and chart review. Per report EMS was called for patient due to weakness. Upon arrival they found him to be confused and hypoxic on RA. At this time patient is not waking to provide history, will wake briefly to state he does not know why he is here or where he is, but does not answer any specific questions. Further history unable to be obtained at this time.      ED course: Afebrile, , RR 18, /91, pulse ox 94% on room air. WBC 16.5, Hgb 16.9. D-dimer 1935. Chemistries remarkable for K 5.7 -> 4.4. Lactate 2.1. Troponin 21. CT head shows ventricular prominence may be NPH vs other hydrocephalus. CT angio chest shows no evidence of acute PE, possible pneumonia vs aspiration pneumonitis, emphysematous changes. Patient            ED Course as of 10/16/23 2232   Mon Oct 16, 2023   1700 WBC(!): 16.5  Concern for sepsis at this time [WJ]   1714 Patient twelve-lead EKG inter by myself shows sinus tachycardia with a ventricular rate of 107, normal LA interval, normal axis, normal QRS duration, normal QT, no STEMI.  Interpretation is limited due to artifact back. [WJ]   1828 XR chest 1 view  Calcified plaques, similar to prior [NS]   1829 CBC and Auto Differential(!)  Leukocytosis noted. Unsure whether related to infection or potential steroid use. Will look into [NS]   1829 Blood Gas Venous Full Panel(!)  Lactate elevated with normal PH [NS]   1829 Comprehensive Metabolic Panel(!)  Mild hyperkalemia noted, mild hemolysis. Will recheck [NS]   1830 D-dimer, VTE Exclusion(!)  D dimer elevated, will require CT [NS]   2104 CT angio chest for pulmonary embolism  CT without  evidence of PE but shows concern for pna [NS]       ED Course User Index  [NS] Partha Hodge MD  [WJ] Srinath Kelly DO           Diagnoses as of 10/16/23 2232   Acute pneumonia   Hypoxia   Delirium         Past Medical History  He has a past medical history of Personal history of other infectious and parasitic diseases (11/26/2019), Personal history of other specified conditions (09/27/2019), and Personal history of other specified conditions (09/27/2019).     Surgical History  He has a past surgical history that includes Other surgical history (04/27/2018) and CT angio neck w and wo IV contrast (4/14/2023).     Social History  He reports that he has quit smoking. His smoking use included cigarettes. He has never used smokeless tobacco. He reports that he does not drink alcohol and does not use drugs.     Family History  Family History               Family History   Problem Relation Name Age of Onset    No Known Problems Mother        No Known Problems Father                Allergies  Patient has no known allergies.        10/18: Patient clinically is looking much better today more more contrary but appears to be feeling quite a bit better.  Continue with current care follow labs.    10/19: Patient with generous ventricular size doubt symptomatic hydrocephalus but can have outpatient follow-up with neurosurgery.  Vertebral artery occlusion follow-up with vascular surgery doubt any intervention.  We will treat underlying infectious process causing some encephalopathy on top of dementia.  Working on discharge planning to ECF continue antibiotics clinically patient is doing better.    10/20: Patient is confused slightly but understands he is going to ECF for rehab.  Will discharge to ECF today.    Patient and/or family are to be given a copy of their discharge profile as well as discharge medications sheet prior to leaving the hospital. Please review these sheets for more concise discharge information and  "instructions.    35 minutes spent in the care of this patient.    Objective     Physical Exam  Cardiovascular:      Rate and Rhythm: Normal rate.   Psychiatric:      Comments: Does have some slowed mentation still.         Last Recorded Vitals  Blood pressure 123/56, pulse 67, temperature 37.1 °C (98.7 °F), temperature source Temporal, resp. rate 16, height 1.753 m (5' 9\"), weight 59 kg (130 lb 1.1 oz), SpO2 92 %.  Intake/Output last 3 Shifts:  I/O last 3 completed shifts:  In: 800 (13.6 mL/kg) [P.O.:700; IV Piggyback:100]  Out: 250 (4.2 mL/kg) [Urine:250 (0.1 mL/kg/hr)]  Weight: 59 kg     Relevant Results    Current Facility-Administered Medications:     acetaminophen (Tylenol) tablet 650 mg, 650 mg, oral, q4h PRN **OR** acetaminophen (Tylenol) oral liquid 650 mg, 650 mg, nasogastric tube, q4h PRN **OR** acetaminophen (Tylenol) suppository 650 mg, 650 mg, rectal, q4h PRN, Augustine Reyes PA-C    acetaminophen (Tylenol) tablet 650 mg, 650 mg, oral, q4h PRN, 650 mg at 10/19/23 2142 **OR** acetaminophen (Tylenol) oral liquid 650 mg, 650 mg, oral, q4h PRN **OR** acetaminophen (Tylenol) suppository 650 mg, 650 mg, rectal, q4h PRN, Augustine Reyes PA-C    aspirin EC tablet 81 mg, 81 mg, oral, Daily, Augustine Reyes PA-C, 81 mg at 10/20/23 0903    atorvastatin (Lipitor) tablet 10 mg, 10 mg, oral, Daily, Augustine Reyes PA-C, 10 mg at 10/20/23 0903    azithromycin (Zithromax) tablet 500 mg, 500 mg, oral, q24h CAITY, Lexa Medina MD, 500 mg at 10/19/23 2142    [Held by provider] cefdinir (Omnicef) capsule 300 mg, 300 mg, oral, BID, Lexa Medina MD    cefTRIAXone (Rocephin) 2 g IV in dextrose 5% 50 mL, 2 g, intravenous, q24h, Lexa Medina MD, Stopped at 10/19/23 2130    clopidogrel (Plavix) tablet 75 mg, 75 mg, oral, Daily, Augustine ARIA Reyes PA-C, 75 mg at 10/20/23 0903    tiotropium (Spiriva Respimat) 2.5 mcg/actuation inhaler 2 puff, 2 Inhalation, inhalation, Daily, 2 puff at 10/20/23 0903 **AND** fluticasone " furoate-vilanteroL (Breo Ellipta) 100-25 mcg/dose inhaler 1 puff, 1 puff, inhalation, Daily, Augustine Reyes PA-C, 1 puff at 10/20/23 0906    heparin (porcine) injection 5,000 Units, 5,000 Units, subcutaneous, q8h, Augustine KNAPP EMILY Reyes, 5,000 Units at 10/18/23 0644    [Held by provider] lisinopril tablet 40 mg, 40 mg, oral, Daily, Augustine Reyes PA-C, 40 mg at 10/17/23 0922    melatonin tablet 3 mg, 3 mg, oral, Nightly PRN, Augustine Reyes PA-C, 3 mg at 10/19/23 2142    oxygen (O2) therapy, , inhalation, Continuous PRN - O2/gases, Lexa Medina MD, 2 L/min at 10/19/23 0831    pantoprazole (ProtoNix) EC tablet 40 mg, 40 mg, oral, Daily before breakfast, 40 mg at 10/18/23 0644 **OR** pantoprazole (ProtoNix) injection 40 mg, 40 mg, intravenous, Daily before breakfast, Augustine Reyes PA-C, 40 mg at 10/17/23 0615    polyethylene glycol (Glycolax, Miralax) packet 17 g, 17 g, oral, Daily, Augustine Reyes PA-C, 17 g at 10/20/23 0903    [Held by provider] vibegron tablet 75 mg, 1 tablet, oral, Daily, Augustine Reyes PA-C    Current Outpatient Medications:     clopidogrel (Plavix) 75 mg tablet, TAKE 1 TABLET BY MOUTH ONCE DAILY., Disp: 30 tablet, Rfl: 6    albuterol 2.5 mg /3 mL (0.083 %) nebulizer solution, Inhale 3 mL every 6 hours., Disp: , Rfl:     albuterol 90 mcg/actuation inhaler, Inhale every 6 hours if needed., Disp: , Rfl:     aspirin 81 mg EC tablet, Take 1 tablet (81 mg) by mouth once daily., Disp: , Rfl:     atorvastatin (Lipitor) 10 mg tablet, Take 1 tablet (10 mg) by mouth once daily., Disp: , Rfl:     azithromycin (Zithromax) 500 mg tablet, Take 1 tablet (500 mg) by mouth once every 24 hours for 3 doses., Disp: 3 tablet, Rfl: 0    cefdinir (Omnicef) 300 mg capsule, Take 1 capsule (300 mg) by mouth 2 times a day for 8 doses., Disp: 8 capsule, Rfl: 0    Gemtesa 75 mg tablet, Take 1 tablet (75 mg) by mouth once daily., Disp: 90 tablet, Rfl: 3    lisinopril 40 mg tablet, Take 1 tablet (40 mg) by mouth  once daily., Disp: , Rfl:     multivitamin tablet, Take 1 tablet by mouth once daily., Disp: , Rfl:     [START ON 10/21/2023] polyethylene glycol (Glycolax, Miralax) packet, Take 17 g by mouth once daily. Do not start before October 21, 2023., Disp: 20 packet, Rfl: 1    Trelegy Ellipta 100-62.5-25 mcg blister with device, Inhale., Disp: , Rfl:      Labs from the last few days have been reviewed.    Assessment/Plan   Principal Problem:    Acute pneumonia  Active Problems:    COPD (chronic obstructive pulmonary disease) (CMS/HCC)    Hyperlipidemia    Acute hypoxic respiratory failure (CMS/HCC)    Altered mental status    Encephalopathy    Cerebral ventriculomegaly    Sepsis (CMS/HCC)    Occlusion of right vertebral artery  DVT prophylaxis      Discharge to ECF see discharge instructions       I spent 45 minutes in the professional and overall care of this patient.      Lexa Medina MD

## 2023-10-20 NOTE — DOCUMENTATION CLARIFICATION NOTE
"    PATIENT:               RUSS KHAN  ACCT #:                  [id number]  MRN:                       [id number]  :                       1945  ADMIT DATE:       10/16/2023 3:43 PM  DISCH DATE:  RESPONDING PROVIDER #:        [id number]          PROVIDER RESPONSE TEXT:    Metabolic encephalopathy sepsis/pneumonia/acute respiratory failure    CDI QUERY TEXT:    UH_Altered Mental Status    Instruction:    Based on your assessment of the patient and the clinical information, please provide the requested documentation by clicking on the appropriate radio button and enter any additional information if prompted.    Question: Please further clarify the most likely etiology of the altered mental status as    When answering this query, please exercise your independent professional judgment. The fact that a question is being asked, does not imply that any particular answer is desired or expected.    The patient's clinical indicators include:  Clinical Information: Patient with sepsis/pneumonia/acute resp failure with noted AMS/encephalopathy    Clinical Indicators: Per H&P, \"Acute pneumonia..Altered mental status- 2/2 above. Currently Aox1. Continue to monitor. Acute hypoxic respiratory failure.\"    Per 10/17 med PN, \"Encephalopathy.\"    Per 10/17 MD response to CDI query, \"Sepsis due to pneumonia with associated acute respiratory organ dysfunction of acute hypoxic respiratory failure POA.\"    GCS:  10/16: 14, 13    Treatment: head CT, GCS, treatment of sepsis/pneumonia with IV antibiotics, treatment of acute resp failure with supplemental O2    Risk Factors: 78yom with sepsis/pneumonia/acute resp failure with noted AMS/encephalopathy  Options provided:  -- Metabolic encephalopathy 2/2 sepsis/pneumonia/acute respiratory failure  -- Other - I will add my own diagnosis  -- Refer to Clinical Documentation Reviewer    Query created by: Rocio Shannon on 10/18/2023 8:01 AM      Electronically signed by:  GALA LAYNE MD " 10/20/2023 7:25 AM

## 2023-10-21 LAB
BACTERIA BLD AEROBE CULT: ABNORMAL
BACTERIA BLD AEROBE CULT: ABNORMAL
BACTERIA BLD CULT: ABNORMAL
BACTERIA BLD CULT: ABNORMAL
BACTERIA BLD CULT: NORMAL
BACTERIA BLD CULT: NORMAL
GRAM STN SPEC: ABNORMAL

## 2023-10-23 ENCOUNTER — LAB REQUISITION (OUTPATIENT)
Dept: LAB | Facility: HOSPITAL | Age: 78
End: 2023-10-23
Payer: MEDICARE

## 2023-10-23 DIAGNOSIS — J44.9 CHRONIC OBSTRUCTIVE PULMONARY DISEASE, UNSPECIFIED (MULTI): ICD-10-CM

## 2023-10-23 LAB
ANION GAP SERPL CALC-SCNC: 10 MMOL/L (ref 10–20)
BUN SERPL-MCNC: 27 MG/DL (ref 6–23)
CALCIUM SERPL-MCNC: 8.5 MG/DL (ref 8.6–10.3)
CHLORIDE SERPL-SCNC: 108 MMOL/L (ref 98–107)
CO2 SERPL-SCNC: 28 MMOL/L (ref 21–32)
CREAT SERPL-MCNC: 0.99 MG/DL (ref 0.5–1.3)
ERYTHROCYTE [DISTWIDTH] IN BLOOD BY AUTOMATED COUNT: 14 % (ref 11.5–14.5)
GFR SERPL CREATININE-BSD FRML MDRD: 77 ML/MIN/1.73M*2
GLUCOSE SERPL-MCNC: 103 MG/DL (ref 74–99)
HCT VFR BLD AUTO: 36.1 % (ref 41–52)
HGB BLD-MCNC: 11.7 G/DL (ref 13.5–17.5)
MCH RBC QN AUTO: 30.4 PG (ref 26–34)
MCHC RBC AUTO-ENTMCNC: 32.4 G/DL (ref 32–36)
MCV RBC AUTO: 94 FL (ref 80–100)
NRBC BLD-RTO: 0 /100 WBCS (ref 0–0)
PLATELET # BLD AUTO: 198 X10*3/UL (ref 150–450)
PMV BLD AUTO: 10.5 FL (ref 7.5–11.5)
POTASSIUM SERPL-SCNC: 5 MMOL/L (ref 3.5–5.3)
RBC # BLD AUTO: 3.85 X10*6/UL (ref 4.5–5.9)
SODIUM SERPL-SCNC: 141 MMOL/L (ref 136–145)
WBC # BLD AUTO: 7.5 X10*3/UL (ref 4.4–11.3)

## 2023-10-23 PROCEDURE — 85027 COMPLETE CBC AUTOMATED: CPT | Mod: OUT | Performed by: INTERNAL MEDICINE

## 2023-10-23 PROCEDURE — 80048 BASIC METABOLIC PNL TOTAL CA: CPT | Mod: OUT | Performed by: INTERNAL MEDICINE

## 2023-10-30 ENCOUNTER — LAB REQUISITION (OUTPATIENT)
Dept: LAB | Facility: HOSPITAL | Age: 78
End: 2023-10-30
Payer: MEDICARE

## 2023-10-30 DIAGNOSIS — J18.9 PNEUMONIA, UNSPECIFIED ORGANISM: ICD-10-CM

## 2023-10-30 DIAGNOSIS — E78.5 HYPERLIPIDEMIA, UNSPECIFIED: ICD-10-CM

## 2023-10-30 LAB
ANION GAP SERPL CALC-SCNC: 11 MMOL/L (ref 10–20)
BUN SERPL-MCNC: 19 MG/DL (ref 6–23)
CALCIUM SERPL-MCNC: 9.2 MG/DL (ref 8.6–10.3)
CHLORIDE SERPL-SCNC: 106 MMOL/L (ref 98–107)
CO2 SERPL-SCNC: 26 MMOL/L (ref 21–32)
CREAT SERPL-MCNC: 0.95 MG/DL (ref 0.5–1.3)
ERYTHROCYTE [DISTWIDTH] IN BLOOD BY AUTOMATED COUNT: 13.7 % (ref 11.5–14.5)
GFR SERPL CREATININE-BSD FRML MDRD: 81 ML/MIN/1.73M*2
GLUCOSE SERPL-MCNC: 133 MG/DL (ref 74–99)
HCT VFR BLD AUTO: 38.9 % (ref 41–52)
HGB BLD-MCNC: 13.1 G/DL (ref 13.5–17.5)
MCH RBC QN AUTO: 30.9 PG (ref 26–34)
MCHC RBC AUTO-ENTMCNC: 33.7 G/DL (ref 32–36)
MCV RBC AUTO: 92 FL (ref 80–100)
NRBC BLD-RTO: 0 /100 WBCS (ref 0–0)
PLATELET # BLD AUTO: 271 X10*3/UL (ref 150–450)
PMV BLD AUTO: 10.5 FL (ref 7.5–11.5)
POTASSIUM SERPL-SCNC: 4.8 MMOL/L (ref 3.5–5.3)
RBC # BLD AUTO: 4.24 X10*6/UL (ref 4.5–5.9)
SODIUM SERPL-SCNC: 138 MMOL/L (ref 136–145)
WBC # BLD AUTO: 9.8 X10*3/UL (ref 4.4–11.3)

## 2023-10-30 PROCEDURE — 80048 BASIC METABOLIC PNL TOTAL CA: CPT | Mod: OUT | Performed by: INTERNAL MEDICINE

## 2023-10-30 PROCEDURE — 85027 COMPLETE CBC AUTOMATED: CPT | Mod: OUT | Performed by: INTERNAL MEDICINE

## 2023-11-02 ENCOUNTER — PATIENT OUTREACH (OUTPATIENT)
Dept: CARE COORDINATION | Age: 78
End: 2023-11-02
Payer: MEDICARE

## 2023-11-02 ENCOUNTER — TELEPHONE (OUTPATIENT)
Dept: PRIMARY CARE | Facility: CLINIC | Age: 78
End: 2023-11-02
Payer: MEDICARE

## 2023-11-02 DIAGNOSIS — J44.9 CHRONIC OBSTRUCTIVE PULMONARY DISEASE, UNSPECIFIED COPD TYPE (MULTI): Primary | ICD-10-CM

## 2023-11-02 NOTE — PROGRESS NOTES
Daily Call Note:   New referral from [facility]. Pt. will d/c from SNF 11/3. [facility] is [facility]. Contact is Angel Rodríguez. Chart is updated.     Pt Education:  Barriers:   Topics for Daily Review:   Pt demonstrates clear understanding:     Daily Weight:  There were no vitals filed for this visit.   Last 3 Weights:  Wt Readings from Last 7 Encounters:   10/16/23 59 kg (130 lb 1.1 oz)   09/21/23 60.8 kg (134 lb)   05/30/23 72.6 kg (160 lb)   03/29/23 65.8 kg (145 lb)   02/20/23 65.3 kg (144 lb)   09/15/21 75.8 kg (167 lb 2 oz)   04/13/21 76.4 kg (168 lb 8 oz)       Masimo Device:    Masimo Clinical Impression:     Virtual Visits--Scheduled (Most Recent Date at Top)  Follow up Appointments  Recent Visits  No visits were found meeting these conditions.  Showing recent visits within past 30 days and meeting all other requirements  Future Appointments  Date Type Provider Dept   12/08/23 Appointment Damian Belle MD Do [place] [id number]   Showing future appointments within next 90 days and meeting all other requirements       Frequency of RN Calls & Virtual Visits per Team Agreement:     Medication issues Addressed (what was done):     Follow up appointments scheduled by [facility] Staff:   Referrals made by [facility] staff:       [facility] Care Transitions Assessment    Patient's Address:   [address] [place] [id number]  **  If this is not the address patient will receive services - alert team and address in EMR**       Patient Contacts:  Extended Emergency Contact Information  Primary Emergency Contact: Angel Rodríguez  Home Phone: [phone]  Relation: Friend  Secondary Emergency Contact: Daniel Sánchez  Home Phone: [phone]  Relation: Other                                Patient's Preferred Phone: [phone]  Patient's E-mail: No e-mail address on record

## 2023-11-02 NOTE — TELEPHONE ENCOUNTER
RN from [place] is requesting a verbal for nursing and therapy home care. Call back is [phone].

## 2023-11-04 ENCOUNTER — PATIENT OUTREACH (OUTPATIENT)
Dept: HOME HEALTH SERVICES | Age: 78
End: 2023-11-04
Payer: MEDICARE

## 2023-11-06 ENCOUNTER — DOCUMENTATION (OUTPATIENT)
Dept: CARE COORDINATION | Facility: CLINIC | Age: 78
End: 2023-11-06
Payer: MEDICARE

## 2023-11-07 ENCOUNTER — PATIENT OUTREACH (OUTPATIENT)
Dept: CARE COORDINATION | Facility: CLINIC | Age: 78
End: 2023-11-07
Payer: MEDICARE

## 2023-11-07 ENCOUNTER — APPOINTMENT (OUTPATIENT)
Dept: RADIOLOGY | Facility: HOSPITAL | Age: 78
End: 2023-11-07
Payer: MEDICARE

## 2023-11-07 ENCOUNTER — HOSPITAL ENCOUNTER (EMERGENCY)
Facility: HOSPITAL | Age: 78
Discharge: HOME | End: 2023-11-07
Attending: STUDENT IN AN ORGANIZED HEALTH CARE EDUCATION/TRAINING PROGRAM
Payer: MEDICARE

## 2023-11-07 VITALS
OXYGEN SATURATION: 98 % | HEIGHT: 67 IN | TEMPERATURE: 97.7 F | BODY MASS INDEX: 22.35 KG/M2 | RESPIRATION RATE: 18 BRPM | SYSTOLIC BLOOD PRESSURE: 144 MMHG | WEIGHT: 142.42 LBS | HEART RATE: 77 BPM | DIASTOLIC BLOOD PRESSURE: 69 MMHG

## 2023-11-07 DIAGNOSIS — J44.1 COPD EXACERBATION (MULTI): Primary | ICD-10-CM

## 2023-11-07 LAB
ALBUMIN SERPL BCP-MCNC: 4 G/DL (ref 3.4–5)
ALP SERPL-CCNC: 89 U/L (ref 33–136)
ALT SERPL W P-5'-P-CCNC: 16 U/L (ref 10–52)
ANION GAP BLDV CALCULATED.4IONS-SCNC: 9 MMOL/L (ref 10–25)
ANION GAP SERPL CALC-SCNC: 13 MMOL/L (ref 10–20)
AST SERPL W P-5'-P-CCNC: 13 U/L (ref 9–39)
BASE EXCESS BLDV CALC-SCNC: 2.4 MMOL/L (ref -2–3)
BASOPHILS # BLD AUTO: 0.01 X10*3/UL (ref 0–0.1)
BASOPHILS NFR BLD AUTO: 0.2 %
BILIRUB SERPL-MCNC: 0.7 MG/DL (ref 0–1.2)
BNP SERPL-MCNC: 97 PG/ML (ref 0–99)
BODY TEMPERATURE: 37 DEGREES CELSIUS
BUN SERPL-MCNC: 23 MG/DL (ref 6–23)
CA-I BLDV-SCNC: 1.24 MMOL/L (ref 1.1–1.33)
CALCIUM SERPL-MCNC: 9.2 MG/DL (ref 8.6–10.3)
CARDIAC TROPONIN I PNL SERPL HS: 7 NG/L (ref 0–20)
CARDIAC TROPONIN I PNL SERPL HS: 7 NG/L (ref 0–20)
CHLORIDE BLDV-SCNC: 104 MMOL/L (ref 98–107)
CHLORIDE SERPL-SCNC: 106 MMOL/L (ref 98–107)
CO2 SERPL-SCNC: 28 MMOL/L (ref 21–32)
CREAT SERPL-MCNC: 1.03 MG/DL (ref 0.5–1.3)
D DIMER PPP FEU-MCNC: 990 NG/ML FEU
EOSINOPHIL # BLD AUTO: 0.02 X10*3/UL (ref 0–0.4)
EOSINOPHIL NFR BLD AUTO: 0.3 %
ERYTHROCYTE [DISTWIDTH] IN BLOOD BY AUTOMATED COUNT: 14.6 % (ref 11.5–14.5)
FLUAV RNA RESP QL NAA+PROBE: NOT DETECTED
FLUBV RNA RESP QL NAA+PROBE: NOT DETECTED
GFR SERPL CREATININE-BSD FRML MDRD: 73 ML/MIN/1.73M*2
GLUCOSE BLDV-MCNC: 139 MG/DL (ref 74–99)
GLUCOSE SERPL-MCNC: 134 MG/DL (ref 74–99)
HCO3 BLDV-SCNC: 29.9 MMOL/L (ref 22–26)
HCT VFR BLD AUTO: 38.6 % (ref 41–52)
HCT VFR BLD EST: 40 % (ref 41–52)
HGB BLD-MCNC: 12.6 G/DL (ref 13.5–17.5)
HGB BLDV-MCNC: 13.2 G/DL (ref 13.5–17.5)
IMM GRANULOCYTES # BLD AUTO: 0.05 X10*3/UL (ref 0–0.5)
IMM GRANULOCYTES NFR BLD AUTO: 0.8 % (ref 0–0.9)
INHALED O2 CONCENTRATION: 21 %
LACTATE BLDV-SCNC: 1.4 MMOL/L (ref 0.4–2)
LACTATE BLDV-SCNC: 4.2 MMOL/L (ref 0.4–2)
LYMPHOCYTES # BLD AUTO: 1.06 X10*3/UL (ref 0.8–3)
LYMPHOCYTES NFR BLD AUTO: 16.2 %
MAGNESIUM SERPL-MCNC: 2.08 MG/DL (ref 1.6–2.4)
MCH RBC QN AUTO: 30.7 PG (ref 26–34)
MCHC RBC AUTO-ENTMCNC: 32.6 G/DL (ref 32–36)
MCV RBC AUTO: 94 FL (ref 80–100)
MONOCYTES # BLD AUTO: 0.63 X10*3/UL (ref 0.05–0.8)
MONOCYTES NFR BLD AUTO: 9.6 %
NEUTROPHILS # BLD AUTO: 4.77 X10*3/UL (ref 1.6–5.5)
NEUTROPHILS NFR BLD AUTO: 72.9 %
NRBC BLD-RTO: 0 /100 WBCS (ref 0–0)
OXYHGB MFR BLDV: 29.5 % (ref 45–75)
PCO2 BLDV: 58 MM HG (ref 41–51)
PH BLDV: 7.32 PH (ref 7.33–7.43)
PLATELET # BLD AUTO: 236 X10*3/UL (ref 150–450)
PO2 BLDV: 30 MM HG (ref 35–45)
POTASSIUM BLDV-SCNC: 4.3 MMOL/L (ref 3.5–5.3)
POTASSIUM SERPL-SCNC: 4.6 MMOL/L (ref 3.5–5.3)
PROT SERPL-MCNC: 6.6 G/DL (ref 6.4–8.2)
RBC # BLD AUTO: 4.1 X10*6/UL (ref 4.5–5.9)
SAO2 % BLDV: 30 % (ref 45–75)
SARS-COV-2 RNA RESP QL NAA+PROBE: NOT DETECTED
SODIUM BLDV-SCNC: 139 MMOL/L (ref 136–145)
SODIUM SERPL-SCNC: 142 MMOL/L (ref 136–145)
WBC # BLD AUTO: 6.5 X10*3/UL (ref 4.4–11.3)

## 2023-11-07 PROCEDURE — 71046 X-RAY EXAM CHEST 2 VIEWS: CPT | Performed by: RADIOLOGY

## 2023-11-07 PROCEDURE — 71275 CT ANGIOGRAPHY CHEST: CPT

## 2023-11-07 PROCEDURE — 2500000004 HC RX 250 GENERAL PHARMACY W/ HCPCS (ALT 636 FOR OP/ED): Performed by: STUDENT IN AN ORGANIZED HEALTH CARE EDUCATION/TRAINING PROGRAM

## 2023-11-07 PROCEDURE — 99285 EMERGENCY DEPT VISIT HI MDM: CPT | Mod: 25

## 2023-11-07 PROCEDURE — 94762 N-INVAS EAR/PLS OXIMTRY CONT: CPT

## 2023-11-07 PROCEDURE — 85379 FIBRIN DEGRADATION QUANT: CPT | Performed by: STUDENT IN AN ORGANIZED HEALTH CARE EDUCATION/TRAINING PROGRAM

## 2023-11-07 PROCEDURE — 71046 X-RAY EXAM CHEST 2 VIEWS: CPT | Mod: FY

## 2023-11-07 PROCEDURE — 36415 COLL VENOUS BLD VENIPUNCTURE: CPT | Performed by: STUDENT IN AN ORGANIZED HEALTH CARE EDUCATION/TRAINING PROGRAM

## 2023-11-07 PROCEDURE — 84484 ASSAY OF TROPONIN QUANT: CPT | Performed by: STUDENT IN AN ORGANIZED HEALTH CARE EDUCATION/TRAINING PROGRAM

## 2023-11-07 PROCEDURE — 84295 ASSAY OF SERUM SODIUM: CPT | Mod: 59 | Performed by: STUDENT IN AN ORGANIZED HEALTH CARE EDUCATION/TRAINING PROGRAM

## 2023-11-07 PROCEDURE — 83605 ASSAY OF LACTIC ACID: CPT | Performed by: STUDENT IN AN ORGANIZED HEALTH CARE EDUCATION/TRAINING PROGRAM

## 2023-11-07 PROCEDURE — 83880 ASSAY OF NATRIURETIC PEPTIDE: CPT | Performed by: STUDENT IN AN ORGANIZED HEALTH CARE EDUCATION/TRAINING PROGRAM

## 2023-11-07 PROCEDURE — 82435 ASSAY OF BLOOD CHLORIDE: CPT | Performed by: STUDENT IN AN ORGANIZED HEALTH CARE EDUCATION/TRAINING PROGRAM

## 2023-11-07 PROCEDURE — 85025 COMPLETE CBC W/AUTO DIFF WBC: CPT | Performed by: STUDENT IN AN ORGANIZED HEALTH CARE EDUCATION/TRAINING PROGRAM

## 2023-11-07 PROCEDURE — 83735 ASSAY OF MAGNESIUM: CPT | Performed by: STUDENT IN AN ORGANIZED HEALTH CARE EDUCATION/TRAINING PROGRAM

## 2023-11-07 PROCEDURE — 96360 HYDRATION IV INFUSION INIT: CPT | Mod: 59

## 2023-11-07 PROCEDURE — 87636 SARSCOV2 & INF A&B AMP PRB: CPT | Performed by: STUDENT IN AN ORGANIZED HEALTH CARE EDUCATION/TRAINING PROGRAM

## 2023-11-07 PROCEDURE — 71275 CT ANGIOGRAPHY CHEST: CPT | Performed by: RADIOLOGY

## 2023-11-07 PROCEDURE — 85018 HEMOGLOBIN: CPT | Mod: 59 | Performed by: STUDENT IN AN ORGANIZED HEALTH CARE EDUCATION/TRAINING PROGRAM

## 2023-11-07 PROCEDURE — 2550000001 HC RX 255 CONTRASTS: Performed by: STUDENT IN AN ORGANIZED HEALTH CARE EDUCATION/TRAINING PROGRAM

## 2023-11-07 RX ORDER — PREDNISONE 20 MG/1
40 TABLET ORAL DAILY
Qty: 10 TABLET | Refills: 0 | Status: SHIPPED | OUTPATIENT
Start: 2023-11-07 | End: 2023-11-18 | Stop reason: HOSPADM

## 2023-11-07 RX ORDER — PREDNISONE 20 MG/1
40 TABLET ORAL ONCE
Status: COMPLETED | OUTPATIENT
Start: 2023-11-07 | End: 2023-11-07

## 2023-11-07 RX ADMIN — SODIUM CHLORIDE 500 ML: 9 INJECTION, SOLUTION INTRAVENOUS at 13:15

## 2023-11-07 RX ADMIN — IOHEXOL 50 ML: 350 INJECTION, SOLUTION INTRAVENOUS at 12:45

## 2023-11-07 RX ADMIN — PREDNISONE 40 MG: 20 TABLET ORAL at 13:51

## 2023-11-07 ASSESSMENT — LIFESTYLE VARIABLES
HAVE YOU EVER FELT YOU SHOULD CUT DOWN ON YOUR DRINKING: NO
EVER FELT BAD OR GUILTY ABOUT YOUR DRINKING: NO
EVER HAD A DRINK FIRST THING IN THE MORNING TO STEADY YOUR NERVES TO GET RID OF A HANGOVER: NO
REASON UNABLE TO ASSESS: NO
HAVE PEOPLE ANNOYED YOU BY CRITICIZING YOUR DRINKING: NO

## 2023-11-07 ASSESSMENT — COLUMBIA-SUICIDE SEVERITY RATING SCALE - C-SSRS
1. IN THE PAST MONTH, HAVE YOU WISHED YOU WERE DEAD OR WISHED YOU COULD GO TO SLEEP AND NOT WAKE UP?: NO
2. HAVE YOU ACTUALLY HAD ANY THOUGHTS OF KILLING YOURSELF?: NO
6. HAVE YOU EVER DONE ANYTHING, STARTED TO DO ANYTHING, OR PREPARED TO DO ANYTHING TO END YOUR LIFE?: NO

## 2023-11-07 ASSESSMENT — PAIN - FUNCTIONAL ASSESSMENT
PAIN_FUNCTIONAL_ASSESSMENT: 0-10
PAIN_FUNCTIONAL_ASSESSMENT: 0-10

## 2023-11-07 ASSESSMENT — PAIN SCALES - GENERAL: PAINLEVEL_OUTOF10: 0 - NO PAIN

## 2023-11-07 NOTE — ED PROVIDER NOTES
HPI   No chief complaint on file.      This is an 78-year-old male past medical history of COPD with no oxygen requirement at baseline, prior CVA, hyperlipidemia, prostate cancer who presents to the emergency department for shortness of breath.  Patient was recently discharged from [facility] found to have pneumonia on Levaquin.  He is currently living at home.  At [facility] he did have a COVID outbreak.  Patient reports over the weekend worsening shortness of breath and using his inhalers more along with a cough.  He denies any fevers, chills, nausea, vomiting, diarrhea or constipation.  EMS gave patient a DuoNeb treatment and he reports breathing symptoms.  He denies any chest pain.  Patient does not have any cardiac history that he knows of.                          No data recorded                Patient History   Past Medical History:   Diagnosis Date    Personal history of other infectious and parasitic diseases 11/26/2019    History of Clostridioides difficile colitis    Personal history of other specified conditions 09/27/2019    History of urinary incontinence    Personal history of other specified conditions 09/27/2019    History of elevated prostate specific antigen (PSA)     Past Surgical History:   Procedure Laterality Date    CT NECK ANGIO W AND WO IV CONTRAST  4/14/2023    CT NECK ANGIO W AND WO IV CONTRAST POR CT    OTHER SURGICAL HISTORY  04/27/2018    Wrist Surgery     Family History   Problem Relation Name Age of Onset    No Known Problems Mother      No Known Problems Father       Social History     Tobacco Use    Smoking status: Former     Types: Cigarettes    Smokeless tobacco: Never   Vaping Use    Vaping Use: Never used   Substance Use Topics    Alcohol use: Never    Drug use: Never       Physical Exam   ED Triage Vitals   Temp Pulse Resp BP   -- -- -- --      SpO2 Temp src Heart Rate Source Patient Position   -- -- -- --      BP Location FiO2 (%)     -- --       Physical Exam  Constitutional:        General: He is not in acute distress.  HENT:      Head: Normocephalic and atraumatic.      Right Ear: Tympanic membrane normal.      Left Ear: Tympanic membrane normal.      Mouth/Throat:      Mouth: Mucous membranes are moist.   Eyes:      Extraocular Movements: Extraocular movements intact.      Conjunctiva/sclera: Conjunctivae normal.      Pupils: Pupils are equal, round, and reactive to light.   Cardiovascular:      Rate and Rhythm: Normal rate and regular rhythm.      Heart sounds: No murmur heard.  Pulmonary:      Effort: Pulmonary effort is normal. No respiratory distress.      Breath sounds: Normal breath sounds. No stridor. No wheezing or rales.   Abdominal:      General: Bowel sounds are normal. There is no distension.      Tenderness: There is no abdominal tenderness. There is no guarding or rebound.   Musculoskeletal:         General: No swelling, tenderness or deformity. Normal range of motion.   Skin:     General: Skin is warm and dry.      Coloration: Skin is not jaundiced.      Findings: No bruising or lesion.   Neurological:      General: No focal deficit present.      Mental Status: He is alert and oriented to person, place, and time. Mental status is at baseline.      Cranial Nerves: No cranial nerve deficit.      Motor: No weakness.   Psychiatric:         Mood and Affect: Mood normal.       Labs Reviewed - No data to display  No orders to display       ED Course & MDM   ED Course as of 11/07/23 1548   Tue Nov 07, 2023   1146 EKG on my read shows normal sinus rhythm at a rate of 63 bpm, no ST changes or elevations nonischemic EKG.  Normal intervals. [CF]   1235 POCT Lactate, Venous(!!): 4.2  Will give fluids and re-eval [CF]   1235 D-Dimer Non VTE, Quant (ng/mL FEU)(!): 990 [CF]   1336 IMPRESSION:  Chronic lung changes.   [CF]   1336 IMPRESSION:  1. No evidence of acute pulmonary embolus.  2. Left lower lobe airspace consolidation, most consistent with  scarring/chronic atelectasis.  3.  Emphysematous changes in the lungs bilaterally.  4. Extensive pleural calcifications in the right chest.       [CF]      ED Course User Index  [CF] Ava Crisostomo MD         Diagnoses as of 11/07/23 1548   COPD exacerbation (CMS/[facility])       Medical Decision Making  78-year-old male presents emergency department for shortness of breath.  Did get a DuoNeb treatment from EMS.  Currently his vitals are stable he reports improvement of symptoms.  On my exam his lungs are clear to auscultation.  Will obtain a chest x-ray since he is on Levaquin for a known pneumonia.  Also obtain COVID, flu to test for any viral infections.  Patient is unsure if he received his vaccines for COVID and flu.  His EKG is nonischemic we will obtain troponin to assess for possible ACS.  We will also obtain chest x-ray to assess for pneumothorax, pulmonary edema along with a proBNP to assess for possible heart failure.    Patient does have mild retention of CO2 but he does not have any increased work of breathing at this time.  He was given prednisone here.  He is already on Levaquin.  His troponins have remained stable and his EKG is nonischemic.  Chest x-ray and CT angiogram no signs of PE or infection.  His labs are otherwise unremarkable.  I spoke with patient about discharge and he feels comfortable.  We will give him a prescription for prednisone.  He is already on Levaquin antibiotic and does not need azithromycin since he is on this med.  He represented to precautions and he does have inhalers at home that he can use.        Procedure  Procedures     Ava Crisostomo MD  11/07/23 1549     electronic post HCTZ/electronic

## 2023-11-07 NOTE — PROGRESS NOTES
Daily Call Note:     Pt Education:   Barriers:   Topics for Daily Review:   Pt demonstrates clear understanding:     Daily Weight:  There were no vitals filed for this visit.   Last 3 Weights:  Wt Readings from Last 7 Encounters:   11/07/23 64.6 kg (142 lb 6.7 oz)   10/16/23 59 kg (130 lb 1.1 oz)   09/21/23 60.8 kg (134 lb)   05/30/23 72.6 kg (160 lb)   03/29/23 65.8 kg (145 lb)   02/20/23 65.3 kg (144 lb)   09/15/21 75.8 kg (167 lb 2 oz)       Masimo Device:    Masimo Clinical Impression:     Virtual Visits--Scheduled (Most Recent Date at Top)  Follow up Appointments  Recent Visits  No visits were found meeting these conditions.  Showing recent visits within past 30 days and meeting all other requirements  Future Appointments  Date Type Provider Dept   12/08/23 Appointment Damian Belle MD [id number] [place] [address]   Showing future appointments within next 90 days and meeting all other requirements       Frequency of RN Calls & Virtual Visits per Team Agreement:     Medication issues Addressed (what was done):     Follow up appointments scheduled by [facility] Staff:   Referrals made by [facility] staff:

## 2023-11-07 NOTE — PROGRESS NOTES
Discharge Facility: [facility]  Discharge Diagnosis: respiratory failure with hypoxia, acute pneumonia  Admission Date: 10/20/23  Discharge Date: 11/3/23    PCP Appointment Date: no appt, task to office  Specialist Appointment Date: n/a  Hospital Encounter and Summary: not available at this time   Unsuccessful attempts to reach patient x2.

## 2023-11-12 ENCOUNTER — APPOINTMENT (OUTPATIENT)
Dept: RADIOLOGY | Facility: HOSPITAL | Age: 78
DRG: 189 | End: 2023-11-12
Payer: MEDICARE

## 2023-11-12 ENCOUNTER — HOSPITAL ENCOUNTER (INPATIENT)
Facility: HOSPITAL | Age: 78
LOS: 6 days | Discharge: NURSING, REHAB OR LONG-TERM CARE | DRG: 189 | End: 2023-11-18
Attending: STUDENT IN AN ORGANIZED HEALTH CARE EDUCATION/TRAINING PROGRAM | Admitting: INTERNAL MEDICINE
Payer: MEDICARE

## 2023-11-12 DIAGNOSIS — R41.82 ALTERED MENTAL STATUS, UNSPECIFIED ALTERED MENTAL STATUS TYPE: Primary | ICD-10-CM

## 2023-11-12 DIAGNOSIS — J44.1 ACUTE EXACERBATION OF CHRONIC OBSTRUCTIVE PULMONARY DISEASE (COPD) (MULTI): ICD-10-CM

## 2023-11-12 DIAGNOSIS — J44.1 COPD EXACERBATION (MULTI): ICD-10-CM

## 2023-11-12 PROBLEM — G93.89 CEREBRAL VENTRICULOMEGALY: Chronic | Status: ACTIVE | Noted: 2023-10-17

## 2023-11-12 PROBLEM — G93.41 ACUTE METABOLIC ENCEPHALOPATHY: Status: ACTIVE | Noted: 2023-11-12

## 2023-11-12 PROBLEM — E78.5 HYPERLIPIDEMIA: Chronic | Status: ACTIVE | Noted: 2023-04-12

## 2023-11-12 LAB
ALBUMIN SERPL BCP-MCNC: 4.2 G/DL (ref 3.4–5)
ALP SERPL-CCNC: 105 U/L (ref 33–136)
ALT SERPL W P-5'-P-CCNC: 15 U/L (ref 10–52)
ANION GAP BLDV CALCULATED.4IONS-SCNC: 10 MMOL/L (ref 10–25)
ANION GAP SERPL CALC-SCNC: 10 MMOL/L (ref 10–20)
AST SERPL W P-5'-P-CCNC: 17 U/L (ref 9–39)
BASE EXCESS BLDV CALC-SCNC: 2.6 MMOL/L (ref -2–3)
BASE EXCESS BLDV CALC-SCNC: 3.5 MMOL/L (ref -2–3)
BASOPHILS # BLD AUTO: 0.01 X10*3/UL (ref 0–0.1)
BASOPHILS NFR BLD AUTO: 0.1 %
BILIRUB SERPL-MCNC: 0.6 MG/DL (ref 0–1.2)
BODY TEMPERATURE: 37 DEGREES CELSIUS
BODY TEMPERATURE: 37 DEGREES CELSIUS
BUN SERPL-MCNC: 19 MG/DL (ref 6–23)
CA-I BLDV-SCNC: 1.16 MMOL/L (ref 1.1–1.33)
CALCIUM SERPL-MCNC: 9.1 MG/DL (ref 8.6–10.3)
CARDIAC TROPONIN I PNL SERPL HS: 8 NG/L (ref 0–20)
CHLORIDE BLDV-SCNC: 105 MMOL/L (ref 98–107)
CHLORIDE SERPL-SCNC: 105 MMOL/L (ref 98–107)
CK SERPL-CCNC: 25 U/L (ref 0–325)
CO2 SERPL-SCNC: 31 MMOL/L (ref 21–32)
CREAT SERPL-MCNC: 1.03 MG/DL (ref 0.5–1.3)
EOSINOPHIL # BLD AUTO: 0.02 X10*3/UL (ref 0–0.4)
EOSINOPHIL NFR BLD AUTO: 0.2 %
ERYTHROCYTE [DISTWIDTH] IN BLOOD BY AUTOMATED COUNT: 14.5 % (ref 11.5–14.5)
FLUAV RNA RESP QL NAA+PROBE: NOT DETECTED
FLUBV RNA RESP QL NAA+PROBE: NOT DETECTED
GFR SERPL CREATININE-BSD FRML MDRD: 73 ML/MIN/1.73M*2
GLUCOSE BLDV-MCNC: 104 MG/DL (ref 74–99)
GLUCOSE SERPL-MCNC: 112 MG/DL (ref 74–99)
HCO3 BLDV-SCNC: 29.1 MMOL/L (ref 22–26)
HCO3 BLDV-SCNC: 30.4 MMOL/L (ref 22–26)
HCT VFR BLD AUTO: 42.3 % (ref 41–52)
HCT VFR BLD EST: 44 % (ref 41–52)
HGB BLD-MCNC: 14.2 G/DL (ref 13.5–17.5)
HGB BLDV-MCNC: 14.6 G/DL (ref 13.5–17.5)
IMM GRANULOCYTES # BLD AUTO: 0.06 X10*3/UL (ref 0–0.5)
IMM GRANULOCYTES NFR BLD AUTO: 0.6 % (ref 0–0.9)
INHALED O2 CONCENTRATION: 100 %
INHALED O2 CONCENTRATION: 21 %
LACTATE BLDV-SCNC: 2.1 MMOL/L (ref 0.4–2)
LACTATE BLDV-SCNC: 3.1 MMOL/L (ref 0.4–2)
LACTATE SERPL-SCNC: 1.9 MMOL/L (ref 0.4–2)
LIPASE SERPL-CCNC: 31 U/L (ref 9–82)
LYMPHOCYTES # BLD AUTO: 0.6 X10*3/UL (ref 0.8–3)
LYMPHOCYTES NFR BLD AUTO: 6.4 %
MCH RBC QN AUTO: 31.2 PG (ref 26–34)
MCHC RBC AUTO-ENTMCNC: 33.6 G/DL (ref 32–36)
MCV RBC AUTO: 93 FL (ref 80–100)
MONOCYTES # BLD AUTO: 0.19 X10*3/UL (ref 0.05–0.8)
MONOCYTES NFR BLD AUTO: 2 %
NEUTROPHILS # BLD AUTO: 8.44 X10*3/UL (ref 1.6–5.5)
NEUTROPHILS NFR BLD AUTO: 90.7 %
NRBC BLD-RTO: 0 /100 WBCS (ref 0–0)
OXYHGB MFR BLDV: 36.9 % (ref 45–75)
OXYHGB MFR BLDV: 82.5 % (ref 45–75)
PCO2 BLDV: 47 MM HG (ref 41–51)
PCO2 BLDV: 59 MM HG (ref 41–51)
PH BLDV: 7.32 PH (ref 7.33–7.43)
PH BLDV: 7.4 PH (ref 7.33–7.43)
PLATELET # BLD AUTO: 224 X10*3/UL (ref 150–450)
PO2 BLDV: 28 MM HG (ref 35–45)
PO2 BLDV: 55 MM HG (ref 35–45)
POTASSIUM BLDV-SCNC: 3.9 MMOL/L (ref 3.5–5.3)
POTASSIUM SERPL-SCNC: 4.4 MMOL/L (ref 3.5–5.3)
PROT SERPL-MCNC: 6.9 G/DL (ref 6.4–8.2)
RBC # BLD AUTO: 4.55 X10*6/UL (ref 4.5–5.9)
SAO2 % BLDV: 37 % (ref 45–75)
SAO2 % BLDV: 84 % (ref 45–75)
SARS-COV-2 RNA RESP QL NAA+PROBE: NOT DETECTED
SODIUM BLDV-SCNC: 141 MMOL/L (ref 136–145)
SODIUM SERPL-SCNC: 142 MMOL/L (ref 136–145)
TEST COMMENT: ABNORMAL
WBC # BLD AUTO: 9.3 X10*3/UL (ref 4.4–11.3)

## 2023-11-12 PROCEDURE — 84484 ASSAY OF TROPONIN QUANT: CPT | Performed by: PHYSICIAN ASSISTANT

## 2023-11-12 PROCEDURE — 2500000002 HC RX 250 W HCPCS SELF ADMINISTERED DRUGS (ALT 637 FOR MEDICARE OP, ALT 636 FOR OP/ED): Performed by: PHYSICIAN ASSISTANT

## 2023-11-12 PROCEDURE — 71045 X-RAY EXAM CHEST 1 VIEW: CPT | Mod: FY

## 2023-11-12 PROCEDURE — 94760 N-INVAS EAR/PLS OXIMETRY 1: CPT

## 2023-11-12 PROCEDURE — 99223 1ST HOSP IP/OBS HIGH 75: CPT | Performed by: STUDENT IN AN ORGANIZED HEALTH CARE EDUCATION/TRAINING PROGRAM

## 2023-11-12 PROCEDURE — 83605 ASSAY OF LACTIC ACID: CPT | Performed by: PHYSICIAN ASSISTANT

## 2023-11-12 PROCEDURE — 2500000004 HC RX 250 GENERAL PHARMACY W/ HCPCS (ALT 636 FOR OP/ED): Performed by: PHYSICIAN ASSISTANT

## 2023-11-12 PROCEDURE — 94761 N-INVAS EAR/PLS OXIMETRY MLT: CPT

## 2023-11-12 PROCEDURE — 87636 SARSCOV2 & INF A&B AMP PRB: CPT | Performed by: PHYSICIAN ASSISTANT

## 2023-11-12 PROCEDURE — 82550 ASSAY OF CK (CPK): CPT | Performed by: PHYSICIAN ASSISTANT

## 2023-11-12 PROCEDURE — 96372 THER/PROPH/DIAG INJ SC/IM: CPT | Performed by: STUDENT IN AN ORGANIZED HEALTH CARE EDUCATION/TRAINING PROGRAM

## 2023-11-12 PROCEDURE — 87075 CULTR BACTERIA EXCEPT BLOOD: CPT | Mod: PORLAB | Performed by: PHYSICIAN ASSISTANT

## 2023-11-12 PROCEDURE — 82805 BLOOD GASES W/O2 SATURATION: CPT | Performed by: STUDENT IN AN ORGANIZED HEALTH CARE EDUCATION/TRAINING PROGRAM

## 2023-11-12 PROCEDURE — 70450 CT HEAD/BRAIN W/O DYE: CPT

## 2023-11-12 PROCEDURE — 2500000004 HC RX 250 GENERAL PHARMACY W/ HCPCS (ALT 636 FOR OP/ED): Performed by: STUDENT IN AN ORGANIZED HEALTH CARE EDUCATION/TRAINING PROGRAM

## 2023-11-12 PROCEDURE — 1200000002 HC GENERAL ROOM WITH TELEMETRY DAILY

## 2023-11-12 PROCEDURE — 84132 ASSAY OF SERUM POTASSIUM: CPT | Performed by: PHYSICIAN ASSISTANT

## 2023-11-12 PROCEDURE — 36415 COLL VENOUS BLD VENIPUNCTURE: CPT | Performed by: PHYSICIAN ASSISTANT

## 2023-11-12 PROCEDURE — 83690 ASSAY OF LIPASE: CPT | Performed by: PHYSICIAN ASSISTANT

## 2023-11-12 PROCEDURE — 94640 AIRWAY INHALATION TREATMENT: CPT

## 2023-11-12 PROCEDURE — 82435 ASSAY OF BLOOD CHLORIDE: CPT | Performed by: PHYSICIAN ASSISTANT

## 2023-11-12 PROCEDURE — 87040 BLOOD CULTURE FOR BACTERIA: CPT | Mod: PORLAB | Performed by: PHYSICIAN ASSISTANT

## 2023-11-12 PROCEDURE — 85025 COMPLETE CBC W/AUTO DIFF WBC: CPT | Performed by: PHYSICIAN ASSISTANT

## 2023-11-12 PROCEDURE — 2500000001 HC RX 250 WO HCPCS SELF ADMINISTERED DRUGS (ALT 637 FOR MEDICARE OP): Performed by: STUDENT IN AN ORGANIZED HEALTH CARE EDUCATION/TRAINING PROGRAM

## 2023-11-12 PROCEDURE — 82947 ASSAY GLUCOSE BLOOD QUANT: CPT | Performed by: PHYSICIAN ASSISTANT

## 2023-11-12 PROCEDURE — 71045 X-RAY EXAM CHEST 1 VIEW: CPT | Performed by: RADIOLOGY

## 2023-11-12 PROCEDURE — 96374 THER/PROPH/DIAG INJ IV PUSH: CPT | Mod: 59

## 2023-11-12 PROCEDURE — 2500000002 HC RX 250 W HCPCS SELF ADMINISTERED DRUGS (ALT 637 FOR MEDICARE OP, ALT 636 FOR OP/ED): Performed by: STUDENT IN AN ORGANIZED HEALTH CARE EDUCATION/TRAINING PROGRAM

## 2023-11-12 PROCEDURE — 70450 CT HEAD/BRAIN W/O DYE: CPT | Performed by: RADIOLOGY

## 2023-11-12 PROCEDURE — 99285 EMERGENCY DEPT VISIT HI MDM: CPT | Mod: 25 | Performed by: STUDENT IN AN ORGANIZED HEALTH CARE EDUCATION/TRAINING PROGRAM

## 2023-11-12 RX ORDER — IPRATROPIUM BROMIDE AND ALBUTEROL SULFATE 2.5; .5 MG/3ML; MG/3ML
3 SOLUTION RESPIRATORY (INHALATION) EVERY 20 MIN
Status: DISPENSED | OUTPATIENT
Start: 2023-11-12 | End: 2023-11-12

## 2023-11-12 RX ORDER — IPRATROPIUM BROMIDE AND ALBUTEROL SULFATE 2.5; .5 MG/3ML; MG/3ML
3 SOLUTION RESPIRATORY (INHALATION)
Status: DISCONTINUED | OUTPATIENT
Start: 2023-11-12 | End: 2023-11-12

## 2023-11-12 RX ORDER — ALBUTEROL SULFATE 90 UG/1
1 AEROSOL, METERED RESPIRATORY (INHALATION) EVERY 4 HOURS PRN
Status: DISCONTINUED | OUTPATIENT
Start: 2023-11-12 | End: 2023-11-18 | Stop reason: HOSPADM

## 2023-11-12 RX ORDER — CLOPIDOGREL BISULFATE 75 MG/1
75 TABLET ORAL DAILY
Status: DISCONTINUED | OUTPATIENT
Start: 2023-11-13 | End: 2023-11-18 | Stop reason: HOSPADM

## 2023-11-12 RX ORDER — PANTOPRAZOLE SODIUM 40 MG/10ML
40 INJECTION, POWDER, LYOPHILIZED, FOR SOLUTION INTRAVENOUS
Status: DISCONTINUED | OUTPATIENT
Start: 2023-11-13 | End: 2023-11-18 | Stop reason: HOSPADM

## 2023-11-12 RX ORDER — ATORVASTATIN CALCIUM 10 MG/1
10 TABLET, FILM COATED ORAL DAILY
Status: DISCONTINUED | OUTPATIENT
Start: 2023-11-13 | End: 2023-11-18 | Stop reason: HOSPADM

## 2023-11-12 RX ORDER — TALC
3 POWDER (GRAM) TOPICAL DAILY
Status: DISCONTINUED | OUTPATIENT
Start: 2023-11-12 | End: 2023-11-18 | Stop reason: HOSPADM

## 2023-11-12 RX ORDER — GUAIFENESIN 600 MG/1
600 TABLET, EXTENDED RELEASE ORAL EVERY 12 HOURS PRN
Status: DISCONTINUED | OUTPATIENT
Start: 2023-11-12 | End: 2023-11-13

## 2023-11-12 RX ORDER — HEPARIN SODIUM 5000 [USP'U]/ML
5000 INJECTION, SOLUTION INTRAVENOUS; SUBCUTANEOUS EVERY 8 HOURS
Status: DISCONTINUED | OUTPATIENT
Start: 2023-11-12 | End: 2023-11-18 | Stop reason: HOSPADM

## 2023-11-12 RX ORDER — FLUTICASONE FUROATE AND VILANTEROL 100; 25 UG/1; UG/1
1 POWDER RESPIRATORY (INHALATION)
Status: DISCONTINUED | OUTPATIENT
Start: 2023-11-13 | End: 2023-11-18 | Stop reason: HOSPADM

## 2023-11-12 RX ORDER — BISACODYL 5 MG
10 TABLET, DELAYED RELEASE (ENTERIC COATED) ORAL DAILY PRN
Status: DISCONTINUED | OUTPATIENT
Start: 2023-11-12 | End: 2023-11-18 | Stop reason: HOSPADM

## 2023-11-12 RX ORDER — PANTOPRAZOLE SODIUM 40 MG/1
40 TABLET, DELAYED RELEASE ORAL
Status: DISCONTINUED | OUTPATIENT
Start: 2023-11-13 | End: 2023-11-18 | Stop reason: HOSPADM

## 2023-11-12 RX ORDER — ONDANSETRON 4 MG/1
4 TABLET, FILM COATED ORAL EVERY 8 HOURS PRN
Status: DISCONTINUED | OUTPATIENT
Start: 2023-11-12 | End: 2023-11-18 | Stop reason: HOSPADM

## 2023-11-12 RX ORDER — BISACODYL 10 MG/1
10 SUPPOSITORY RECTAL DAILY PRN
Status: DISCONTINUED | OUTPATIENT
Start: 2023-11-12 | End: 2023-11-18 | Stop reason: HOSPADM

## 2023-11-12 RX ORDER — ONDANSETRON HYDROCHLORIDE 2 MG/ML
4 INJECTION, SOLUTION INTRAVENOUS EVERY 8 HOURS PRN
Status: DISCONTINUED | OUTPATIENT
Start: 2023-11-12 | End: 2023-11-18 | Stop reason: HOSPADM

## 2023-11-12 RX ORDER — POLYETHYLENE GLYCOL 3350 17 G/17G
17 POWDER, FOR SOLUTION ORAL DAILY
Status: DISCONTINUED | OUTPATIENT
Start: 2023-11-13 | End: 2023-11-18 | Stop reason: HOSPADM

## 2023-11-12 RX ORDER — LISINOPRIL 20 MG/1
40 TABLET ORAL DAILY
Status: DISCONTINUED | OUTPATIENT
Start: 2023-11-13 | End: 2023-11-18 | Stop reason: HOSPADM

## 2023-11-12 RX ORDER — ACETAMINOPHEN 325 MG/1
650 TABLET ORAL EVERY 4 HOURS PRN
Status: DISCONTINUED | OUTPATIENT
Start: 2023-11-12 | End: 2023-11-18 | Stop reason: HOSPADM

## 2023-11-12 RX ORDER — IPRATROPIUM BROMIDE AND ALBUTEROL SULFATE 2.5; .5 MG/3ML; MG/3ML
3 SOLUTION RESPIRATORY (INHALATION)
Status: DISCONTINUED | OUTPATIENT
Start: 2023-11-13 | End: 2023-11-16

## 2023-11-12 RX ORDER — ASPIRIN 81 MG/1
81 TABLET ORAL DAILY
Status: DISCONTINUED | OUTPATIENT
Start: 2023-11-13 | End: 2023-11-18 | Stop reason: HOSPADM

## 2023-11-12 RX ADMIN — METHYLPREDNISOLONE SODIUM SUCCINATE 40 MG: 40 INJECTION, POWDER, LYOPHILIZED, FOR SOLUTION INTRAMUSCULAR; INTRAVENOUS at 22:26

## 2023-11-12 RX ADMIN — IPRATROPIUM BROMIDE AND ALBUTEROL SULFATE 3 ML: 2.5; .5 SOLUTION RESPIRATORY (INHALATION) at 21:59

## 2023-11-12 RX ADMIN — IPRATROPIUM BROMIDE AND ALBUTEROL SULFATE 3 ML: 2.5; .5 SOLUTION RESPIRATORY (INHALATION) at 16:48

## 2023-11-12 RX ADMIN — IPRATROPIUM BROMIDE AND ALBUTEROL SULFATE 3 ML: 2.5; .5 SOLUTION RESPIRATORY (INHALATION) at 16:25

## 2023-11-12 RX ADMIN — IPRATROPIUM BROMIDE AND ALBUTEROL SULFATE 3 ML: 2.5; .5 SOLUTION RESPIRATORY (INHALATION) at 17:14

## 2023-11-12 RX ADMIN — Medication 3 MG: at 22:26

## 2023-11-12 RX ADMIN — HEPARIN SODIUM 5000 UNITS: 5000 INJECTION INTRAVENOUS; SUBCUTANEOUS at 22:26

## 2023-11-12 RX ADMIN — METHYLPREDNISOLONE SODIUM SUCCINATE 125 MG: 125 INJECTION, POWDER, FOR SOLUTION INTRAMUSCULAR; INTRAVENOUS at 16:25

## 2023-11-12 RX ADMIN — DOXYCYCLINE 100 MG: 100 INJECTION, POWDER, LYOPHILIZED, FOR SOLUTION INTRAVENOUS at 22:26

## 2023-11-12 SDOH — SOCIAL STABILITY: SOCIAL INSECURITY: DO YOU FEEL UNSAFE GOING BACK TO THE PLACE WHERE YOU ARE LIVING?: NO

## 2023-11-12 SDOH — SOCIAL STABILITY: SOCIAL INSECURITY: ABUSE: ADULT

## 2023-11-12 SDOH — SOCIAL STABILITY: SOCIAL INSECURITY: ARE YOU OR HAVE YOU BEEN THREATENED OR ABUSED PHYSICALLY, EMOTIONALLY, OR SEXUALLY BY ANYONE?: NO

## 2023-11-12 SDOH — SOCIAL STABILITY: SOCIAL INSECURITY: ARE THERE ANY APPARENT SIGNS OF INJURIES/BEHAVIORS THAT COULD BE RELATED TO ABUSE/NEGLECT?: NO

## 2023-11-12 SDOH — SOCIAL STABILITY: SOCIAL INSECURITY: DOES ANYONE TRY TO KEEP YOU FROM HAVING/CONTACTING OTHER FRIENDS OR DOING THINGS OUTSIDE YOUR HOME?: NO

## 2023-11-12 SDOH — SOCIAL STABILITY: SOCIAL INSECURITY: HAS ANYONE EVER THREATENED TO HURT YOUR FAMILY OR YOUR PETS?: NO

## 2023-11-12 SDOH — SOCIAL STABILITY: SOCIAL INSECURITY: HAVE YOU HAD THOUGHTS OF HARMING ANYONE ELSE?: NO

## 2023-11-12 SDOH — SOCIAL STABILITY: SOCIAL INSECURITY: DO YOU FEEL ANYONE HAS EXPLOITED OR TAKEN ADVANTAGE OF YOU FINANCIALLY OR OF YOUR PERSONAL PROPERTY?: NO

## 2023-11-12 ASSESSMENT — COGNITIVE AND FUNCTIONAL STATUS - GENERAL
MOVING FROM LYING ON BACK TO SITTING ON SIDE OF FLAT BED WITH BEDRAILS: A LITTLE
STANDING UP FROM CHAIR USING ARMS: A LITTLE
DAILY ACTIVITIY SCORE: 20
MOVING TO AND FROM BED TO CHAIR: A LITTLE
TOILETING: A LITTLE
DRESSING REGULAR LOWER BODY CLOTHING: A LITTLE
DRESSING REGULAR UPPER BODY CLOTHING: A LITTLE
CLIMB 3 TO 5 STEPS WITH RAILING: A LOT
TURNING FROM BACK TO SIDE WHILE IN FLAT BAD: A LITTLE
MOBILITY SCORE: 16
PATIENT BASELINE BEDBOUND: NO
WALKING IN HOSPITAL ROOM: A LOT
HELP NEEDED FOR BATHING: A LITTLE

## 2023-11-12 ASSESSMENT — PAIN SCALES - GENERAL
PAINLEVEL_OUTOF10: 0 - NO PAIN

## 2023-11-12 ASSESSMENT — ENCOUNTER SYMPTOMS
ABDOMINAL PAIN: 0
BLOOD IN STOOL: 0
POLYDIPSIA: 0
CONFUSION: 1
CHILLS: 0
COLOR CHANGE: 0
HEMATURIA: 0
LIGHT-HEADEDNESS: 0
DIZZINESS: 0
FEVER: 0
PALPITATIONS: 0
PHOTOPHOBIA: 0
VOMITING: 0
TREMORS: 0
WEAKNESS: 0
COUGH: 1
SLEEP DISTURBANCE: 0
DYSURIA: 0
SHORTNESS OF BREATH: 1
NAUSEA: 0

## 2023-11-12 ASSESSMENT — ACTIVITIES OF DAILY LIVING (ADL)
PATIENT'S MEMORY ADEQUATE TO SAFELY COMPLETE DAILY ACTIVITIES?: NO
ASSISTIVE_DEVICE: CANE;WALKER
WALKS IN HOME: NEEDS ASSISTANCE
BATHING: INDEPENDENT
HEARING - RIGHT EAR: FUNCTIONAL
LACK_OF_TRANSPORTATION: NO
DRESSING YOURSELF: INDEPENDENT
JUDGMENT_ADEQUATE_SAFELY_COMPLETE_DAILY_ACTIVITIES: NO
HEARING - LEFT EAR: FUNCTIONAL
TOILETING: NEEDS ASSISTANCE
FEEDING YOURSELF: INDEPENDENT
GROOMING: INDEPENDENT
ADEQUATE_TO_COMPLETE_ADL: YES

## 2023-11-12 ASSESSMENT — LIFESTYLE VARIABLES
HAVE PEOPLE ANNOYED YOU BY CRITICIZING YOUR DRINKING: NO
AUDIT-C TOTAL SCORE: 0
EVER FELT BAD OR GUILTY ABOUT YOUR DRINKING: NO
AUDIT-C TOTAL SCORE: 0
SKIP TO QUESTIONS 9-10: 1
EVER HAD A DRINK FIRST THING IN THE MORNING TO STEADY YOUR NERVES TO GET RID OF A HANGOVER: NO
REASON UNABLE TO ASSESS: NO
HAVE YOU EVER FELT YOU SHOULD CUT DOWN ON YOUR DRINKING: NO
HOW OFTEN DO YOU HAVE A DRINK CONTAINING ALCOHOL: NEVER
HOW OFTEN DO YOU HAVE 6 OR MORE DRINKS ON ONE OCCASION: NEVER
HOW MANY STANDARD DRINKS CONTAINING ALCOHOL DO YOU HAVE ON A TYPICAL DAY: PATIENT DOES NOT DRINK

## 2023-11-12 ASSESSMENT — PAIN - FUNCTIONAL ASSESSMENT: PAIN_FUNCTIONAL_ASSESSMENT: 0-10

## 2023-11-12 ASSESSMENT — COLUMBIA-SUICIDE SEVERITY RATING SCALE - C-SSRS
2. HAVE YOU ACTUALLY HAD ANY THOUGHTS OF KILLING YOURSELF?: NO
6. HAVE YOU EVER DONE ANYTHING, STARTED TO DO ANYTHING, OR PREPARED TO DO ANYTHING TO END YOUR LIFE?: NO
1. IN THE PAST MONTH, HAVE YOU WISHED YOU WERE DEAD OR WISHED YOU COULD GO TO SLEEP AND NOT WAKE UP?: NO

## 2023-11-12 ASSESSMENT — PATIENT HEALTH QUESTIONNAIRE - PHQ9
1. LITTLE INTEREST OR PLEASURE IN DOING THINGS: NOT AT ALL
2. FEELING DOWN, DEPRESSED OR HOPELESS: NOT AT ALL
SUM OF ALL RESPONSES TO PHQ9 QUESTIONS 1 & 2: 0

## 2023-11-12 NOTE — ED PROVIDER NOTES
"HPI   Chief Complaint   Patient presents with   • Shortness of Breath     Pt to ER with c/o shortness of breath and sliding out of his lift chair.       Patient has altered mental status, so history from patient is limited.  Some of information was obtained by friend Angel at [phone].    History of present illness:  Pt is a 78 year old male that presents to the ED with shortness of breath and recent fall. He stated that he was feeling short of breath and adjusted in his chair and \"slide\" out of his chair. He was brought in via EMS and had significant AMS, but became better when he was administered oxygen. EMS reports finding him on the grown and unable to get up on his own.  Pt is able to identify his name, birthday, he knows where he is currently at, but identifies the date as February 2005. He reports that he lives alone and appears confused about what medications he is currently taking. Pt reports that he does not have family and he has a neighbor that helps with his decision making. Pt reports that he does not always make it to the bathroom and urinates on himself. Pt denies any chest pain, head injury, syncope event, or numbness or pain in his extremities. Pt admits that he does have a difficult time taking care of himself at home.     Review of systems: Constitutional, eye, ENT, cardiovascular, respiratory, gastrointestinal, genitourinary, neurologic, musculoskeletal, dermatologic, hematologic, endocrine systems were evaluated and were negative unless otherwise specified in history of present illness.    Medications: Reviewed and per nursing note.    Family history: Denies relevant medical conditions.    Social history: Denies tobacco, alcohol, drug use.    Physical exam:    Appearance: Well-developed, well-nourished, nontoxic-appearing, alert and oriented x2, not to time which is different than baseline. Talking in complete sentences.    HEENT:  Head normocephalic atraumatic, extraocular movements intact, " pupils equal round reactive to light, mucous membranes are moist and pink.    NECK:  Nml Inspection, no meningismus, no thyromegaly, no lymphadenopathy, no JVD, trachea is midline.    Respiratory: Diminished breath sounds bilaterally with rhonchi in the bases.      Cardiovascular: Regular rate and rhythm, no murmurs, rubs or gallops. Pulses 2+ symmetrically in the dorsalis pedis and radial pulses.    Abdomen/GI:  Soft, nontender, nondistended, normal bowel sounds x4. No masses or organomegaly.    :  No CVA tenderness    Neuro:  Oriented x 2, Speech Clear, cranial nerves grossly intact. Normal sensation to light touch in all 4 extremities.    Musculoskeletal: Patient spontaneously moves all 4 extremities.    Skin:  No cyanosis, clubbing, edema, open wounds, or rashes.                              No data recorded                Patient History   Past Medical History:   Diagnosis Date   • Personal history of other infectious and parasitic diseases 11/26/2019    History of Clostridioides difficile colitis   • Personal history of other specified conditions 09/27/2019    History of urinary incontinence   • Personal history of other specified conditions 09/27/2019    History of elevated prostate specific antigen (PSA)     Past Surgical History:   Procedure Laterality Date   • CT NECK ANGIO W AND WO IV CONTRAST  4/14/2023    CT NECK ANGIO W AND WO IV CONTRAST POR CT   • OTHER SURGICAL HISTORY  04/27/2018    Wrist Surgery     Family History   Problem Relation Name Age of Onset   • No Known Problems Mother     • No Known Problems Father       Social History     Tobacco Use   • Smoking status: Former     Types: Cigarettes   • Smokeless tobacco: Never   Vaping Use   • Vaping Use: Never used   Substance Use Topics   • Alcohol use: Never   • Drug use: Never       Physical Exam   ED Triage Vitals   Temp Heart Rate Resp BP   11/12/23 1526 11/12/23 1526 11/12/23 1526 11/12/23 1526   36.8 °C (98.3 °F) 69 20 154/59      SpO2 Temp  Source Heart Rate Source Patient Position   11/12/23 1526 11/12/23 1526 11/12/23 1538 11/12/23 1526   99 % Tympanic Monitor Sitting      BP Location FiO2 (%)     11/12/23 1526 --     Left arm        Physical Exam    ED Course & MDM   Diagnoses as of 11/12/23 1816   Altered mental status, unspecified altered mental status type   COPD exacerbation (CMS/HCC)       Medical Decision Making  EKG: Normal sinus rhythm at rate of 64 bpm with no ST segment elevation or ectopy.  Early right bundle branch block.  No evidence of acute ischemia.  MO interval 128 with a QTc 420.  This is interpreted by myself.    Labs Reviewed  CBC WITH AUTO DIFFERENTIAL - Abnormal     WBC                           9.3                    nRBC                          0.0                    RBC                           4.55                   Hemoglobin                    14.2                   Hematocrit                    42.3                   MCV                           93                     MCH                           31.2                   MCHC                          33.6                   RDW                           14.5                   Platelets                     224                    Neutrophils %                 90.7                   Immature Granulocytes %, Automated   0.6                    Lymphocytes %                 6.4                    Monocytes %                   2.0                    Eosinophils %                 0.2                    Basophils %                   0.1                    Neutrophils Absolute          8.44 (*)               Immature Granulocytes Absolute, Au*   0.06                   Lymphocytes Absolute          0.60 (*)               Monocytes Absolute            0.19                   Eosinophils Absolute          0.02                   Basophils Absolute            0.01                COMPREHENSIVE METABOLIC PANEL - Abnormal     Glucose                       112 (*)                Sodium                         142                    Potassium                     4.4                    Chloride                      105                    Bicarbonate                   31                     Anion Gap                     10                     Urea Nitrogen                 19                     Creatinine                    1.03                   eGFR                          73                     Calcium                       9.1                    Albumin                       4.2                    Alkaline Phosphatase          105                    Total Protein                 6.9                    AST                           17                     Bilirubin, Total              0.6                    ALT                           15                  BLOOD GAS VENOUS FULL PANEL - Abnormal     POCT pH, Venous               7.32 (*)               POCT pCO2, Venous             59 (*)                 POCT pO2, Venous              28 (*)                 POCT SO2, Venous              37 (*)                 POCT Oxy Hemoglobin, Venous   36.9 (*)               POCT Hematocrit Calculated, Venous   44.0                   POCT Sodium, Venous           141                    POCT Potassium, Venous        3.9                    POCT Chloride, Venous         105                    POCT Ionized Calicum, Venous   1.16                   POCT Glucose, Venous          104 (*)                POCT Lactate, Venous          3.1 (*)                POCT Base Excess, Venous      2.6                    POCT HCO3 Calculated, Venous   30.4 (*)               POCT Hemoglobin, Venous       14.6                   POCT Anion Gap, Venous        10.0                   Patient Temperature           37.0                   FiO2                          100                 BLOOD GAS LACTIC ACID, VENOUS - Abnormal     POCT Lactate, Venous          2.1 (*)             LIPASE - Normal     Lipase                        31                          Narrative: Venipuncture immediately after or during the administration of Metamizole may lead to falsely low results. Testing should be performed immediately prior to Metamizole dosing.  SARS-COV-2 PCR, SYMPTOMATIC - Normal     Coronavirus 2019, PCR                                    Narrative: This assay has received FDA Emergency Use Authorization (EUA) and is only authorized for the duration of time that circumstances exist to justify the authorization of the emergency use of in vitro diagnostic tests for the detection of SARS-CoV-2 virus and/or diagnosis of COVID-19 infection under section 564(b)(1) of the Act, 21 U.S.C. 360bbb-3(b)(1). This assay is an in vitro diagnostic nucleic acid amplification test for the qualitative detection of SARS-CoV-2 from nasopharyngeal specimens and has been validated for use at [facility]. Negative results do not preclude COVID-19 infections and should not be used as the sole basis for diagnosis, treatment, or other management decisions.                    INFLUENZA A AND B PCR - Normal     Flu A Result                                         Flu B Result                                             Narrative: This assay is an in vitro diagnostic multiplex nucleic acid amplification test for the detection and discrimination of Influenza A & B from nasopharyngeal specimens, and has been validated for use at [facility]. Negative results do not preclude Influenza A/B infections, and should not be used as the sole basis for diagnosis, treatment, or other management decisions. If Influenza A/B and RSV PCR results are negative, testing for Parainfluenza virus, Adenovirus and Metapneumovirus is routinely performed for [facility] pediatric oncology and intensive care inpatients, and is available on other patients by placing an add-on request.  TROPONIN I, HIGH SENSITIVITY - Normal     Troponin I, High Sensitivity   8                           Narrative: Less than 99th percentile of normal range cutoff-                  Female and children under 18 years old <14 ng/L; Male <21 ng/L: Negative                  Repeat testing should be performed if clinically indicated.                                     Female and children under 18 years old 14-50 ng/L; Male 21-50 ng/L:                  Consistent with possible cardiac damage and possible increased clinical                   risk. Serial measurements may help to assess extent of myocardial damage.                                     >50 ng/L: Consistent with cardiac damage, increased clinical risk and                  myocardial infarction. Serial measurements may help assess extent of                   myocardial damage.                                      NOTE: Children less than 1 year old may have higher baseline troponin                   levels and results should be interpreted in conjunction with the overall                   clinical context.                                     NOTE: Troponin I testing is performed using a different                   testing methodology at [facility] than at other                   [facility]. Direct result comparisons should only                   be made within the same method.  LACTATE - Normal     Lactate                       1.9                        Narrative: Venipuncture immediately after or during the administration of Metamizole may lead to falsely low results. Testing should be performed immediately                  prior to Metamizole dosing.  CREATINE KINASE - Normal     Creatine Kinase               25                  BLOOD CULTURE  BLOOD CULTURE  URINALYSIS WITH REFLEX MICROSCOPIC AND CULTURE    XR chest 1 view   Final Result    No acute cardiopulmonary abnormality.          Signed by: Bhavesh Barros 11/12/2023 4:45 PM    Dictation workstation:   [id number]     CT head wo IV contrast    (Results Pending)    Patient  presents with altered mental status shortness of breath and a fall to the floor with inability to get up.  Differential diagnosis of delirium, UTI, pneumonia, COPD exacerbation with hypercapnia, acute coronary syndrome, electrolyte disturbance, renal failure, intracranial hemorrhage, stroke.  Examination shows patient alert and oriented x2 which is different than baseline.  Patient also has some shortness of breath with diminished breath sounds and rhonchi.  Patient required 2 L nasal cannula.    CBC CMP cardiac enzymes EKG lactate CT head chest x-ray urinalysis and cultures were obtained.  Given DuoNeb breathing treatments and Solu-Medrol IV.    Labs show normal white blood cell counts hemoglobin hematocrit.Venous full panel shows pH 7.32 with PCO2 of 59.  Patient most likely has respiratory acidosis and hypercapnia from severe COPD.  This could be causing alteration in mental status.  Chest x-ray shows no new infiltrates.  CT head is pending.  Patient will require admission to the hospital for further evaluation management treatment and likely require rehabilitation as he has difficulty living on his own at this time.  Underlying issue most likely COPD with hypercapnia.        Procedure  Procedures     Adam López PA-C  11/12/23 [id number]

## 2023-11-12 NOTE — ED TRIAGE NOTES
Pt to Er with c/o shortness of breath, and slid when trying to get out of lift chair. Pt alert and oriented x 3, follows commands appropriately.

## 2023-11-13 PROBLEM — Z86.73 HISTORY OF STROKE: Status: ACTIVE | Noted: 2023-11-13

## 2023-11-13 PROBLEM — I10 HTN (HYPERTENSION): Status: ACTIVE | Noted: 2023-11-13

## 2023-11-13 LAB
ANION GAP SERPL CALC-SCNC: 11 MMOL/L (ref 10–20)
APPEARANCE UR: ABNORMAL
BILIRUB UR STRIP.AUTO-MCNC: NEGATIVE MG/DL
BUN SERPL-MCNC: 21 MG/DL (ref 6–23)
CALCIUM SERPL-MCNC: 8.4 MG/DL (ref 8.6–10.3)
CHLORIDE SERPL-SCNC: 107 MMOL/L (ref 98–107)
CO2 SERPL-SCNC: 27 MMOL/L (ref 21–32)
COLOR UR: ABNORMAL
CREAT SERPL-MCNC: 0.92 MG/DL (ref 0.5–1.3)
ERYTHROCYTE [DISTWIDTH] IN BLOOD BY AUTOMATED COUNT: 14.1 % (ref 11.5–14.5)
GFR SERPL CREATININE-BSD FRML MDRD: 84 ML/MIN/1.73M*2
GLUCOSE BLD MANUAL STRIP-MCNC: 197 MG/DL (ref 74–99)
GLUCOSE SERPL-MCNC: 182 MG/DL (ref 74–99)
GLUCOSE UR STRIP.AUTO-MCNC: ABNORMAL MG/DL
HCT VFR BLD AUTO: 33.4 % (ref 41–52)
HGB BLD-MCNC: 11.1 G/DL (ref 13.5–17.5)
HOLD SPECIMEN: NORMAL
KETONES UR STRIP.AUTO-MCNC: ABNORMAL MG/DL
LEUKOCYTE ESTERASE UR QL STRIP.AUTO: NEGATIVE
MAGNESIUM SERPL-MCNC: 1.91 MG/DL (ref 1.6–2.4)
MCH RBC QN AUTO: 30.7 PG (ref 26–34)
MCHC RBC AUTO-ENTMCNC: 33.2 G/DL (ref 32–36)
MCV RBC AUTO: 93 FL (ref 80–100)
NITRITE UR QL STRIP.AUTO: NEGATIVE
NRBC BLD-RTO: 0 /100 WBCS (ref 0–0)
PH UR STRIP.AUTO: 5 [PH]
PLATELET # BLD AUTO: 188 X10*3/UL (ref 150–450)
POTASSIUM SERPL-SCNC: 4.2 MMOL/L (ref 3.5–5.3)
PROT UR STRIP.AUTO-MCNC: NEGATIVE MG/DL
RBC # BLD AUTO: 3.61 X10*6/UL (ref 4.5–5.9)
RBC # UR STRIP.AUTO: NEGATIVE /UL
SODIUM SERPL-SCNC: 141 MMOL/L (ref 136–145)
SP GR UR STRIP.AUTO: 1.03
TSH SERPL-ACNC: 0.56 MIU/L (ref 0.44–3.98)
UROBILINOGEN UR STRIP.AUTO-MCNC: 2 MG/DL
WBC # BLD AUTO: 5.4 X10*3/UL (ref 4.4–11.3)

## 2023-11-13 PROCEDURE — 1200000002 HC GENERAL ROOM WITH TELEMETRY DAILY

## 2023-11-13 PROCEDURE — 2500000004 HC RX 250 GENERAL PHARMACY W/ HCPCS (ALT 636 FOR OP/ED): Performed by: PHYSICIAN ASSISTANT

## 2023-11-13 PROCEDURE — 81003 URINALYSIS AUTO W/O SCOPE: CPT | Performed by: STUDENT IN AN ORGANIZED HEALTH CARE EDUCATION/TRAINING PROGRAM

## 2023-11-13 PROCEDURE — 99233 SBSQ HOSP IP/OBS HIGH 50: CPT | Performed by: PHYSICIAN ASSISTANT

## 2023-11-13 PROCEDURE — 85027 COMPLETE CBC AUTOMATED: CPT | Performed by: STUDENT IN AN ORGANIZED HEALTH CARE EDUCATION/TRAINING PROGRAM

## 2023-11-13 PROCEDURE — 82374 ASSAY BLOOD CARBON DIOXIDE: CPT | Performed by: STUDENT IN AN ORGANIZED HEALTH CARE EDUCATION/TRAINING PROGRAM

## 2023-11-13 PROCEDURE — 84443 ASSAY THYROID STIM HORMONE: CPT | Performed by: STUDENT IN AN ORGANIZED HEALTH CARE EDUCATION/TRAINING PROGRAM

## 2023-11-13 PROCEDURE — 83735 ASSAY OF MAGNESIUM: CPT | Performed by: STUDENT IN AN ORGANIZED HEALTH CARE EDUCATION/TRAINING PROGRAM

## 2023-11-13 PROCEDURE — 97162 PT EVAL MOD COMPLEX 30 MIN: CPT | Mod: GP

## 2023-11-13 PROCEDURE — 96372 THER/PROPH/DIAG INJ SC/IM: CPT | Performed by: STUDENT IN AN ORGANIZED HEALTH CARE EDUCATION/TRAINING PROGRAM

## 2023-11-13 PROCEDURE — 97165 OT EVAL LOW COMPLEX 30 MIN: CPT | Mod: GO

## 2023-11-13 PROCEDURE — 2500000004 HC RX 250 GENERAL PHARMACY W/ HCPCS (ALT 636 FOR OP/ED): Performed by: STUDENT IN AN ORGANIZED HEALTH CARE EDUCATION/TRAINING PROGRAM

## 2023-11-13 PROCEDURE — 36415 COLL VENOUS BLD VENIPUNCTURE: CPT | Performed by: STUDENT IN AN ORGANIZED HEALTH CARE EDUCATION/TRAINING PROGRAM

## 2023-11-13 PROCEDURE — 94640 AIRWAY INHALATION TREATMENT: CPT

## 2023-11-13 PROCEDURE — 82947 ASSAY GLUCOSE BLOOD QUANT: CPT

## 2023-11-13 PROCEDURE — 2500000001 HC RX 250 WO HCPCS SELF ADMINISTERED DRUGS (ALT 637 FOR MEDICARE OP): Performed by: STUDENT IN AN ORGANIZED HEALTH CARE EDUCATION/TRAINING PROGRAM

## 2023-11-13 PROCEDURE — 2500000002 HC RX 250 W HCPCS SELF ADMINISTERED DRUGS (ALT 637 FOR MEDICARE OP, ALT 636 FOR OP/ED): Performed by: STUDENT IN AN ORGANIZED HEALTH CARE EDUCATION/TRAINING PROGRAM

## 2023-11-13 RX ORDER — GUAIFENESIN 600 MG/1
1200 TABLET, EXTENDED RELEASE ORAL 2 TIMES DAILY
Status: DISCONTINUED | OUTPATIENT
Start: 2023-11-13 | End: 2023-11-18 | Stop reason: HOSPADM

## 2023-11-13 RX ORDER — SODIUM CHLORIDE 0.9 % (FLUSH) 0.9 %
SYRINGE (ML) INJECTION
Status: COMPLETED
Start: 2023-11-13 | End: 2023-11-13

## 2023-11-13 RX ADMIN — Medication 10 ML: at 22:42

## 2023-11-13 RX ADMIN — METHYLPREDNISOLONE SODIUM SUCCINATE 40 MG: 40 INJECTION, POWDER, LYOPHILIZED, FOR SOLUTION INTRAMUSCULAR; INTRAVENOUS at 14:00

## 2023-11-13 RX ADMIN — CLOPIDOGREL 75 MG: 75 TABLET ORAL at 08:09

## 2023-11-13 RX ADMIN — TIOTROPIUM BROMIDE INHALATION SPRAY 2 PUFF: 3.12 SPRAY, METERED RESPIRATORY (INHALATION) at 06:10

## 2023-11-13 RX ADMIN — LISINOPRIL 40 MG: 20 TABLET ORAL at 08:09

## 2023-11-13 RX ADMIN — GUAIFENESIN 1200 MG: 600 TABLET ORAL at 22:44

## 2023-11-13 RX ADMIN — Medication 3 MG: at 22:18

## 2023-11-13 RX ADMIN — FLUTICASONE FUROATE AND VILANTEROL TRIFENATATE 1 PUFF: 100; 25 POWDER RESPIRATORY (INHALATION) at 06:11

## 2023-11-13 RX ADMIN — ATORVASTATIN CALCIUM 10 MG: 10 TABLET, FILM COATED ORAL at 22:17

## 2023-11-13 RX ADMIN — IPRATROPIUM BROMIDE AND ALBUTEROL SULFATE 3 ML: 2.5; .5 SOLUTION RESPIRATORY (INHALATION) at 20:42

## 2023-11-13 RX ADMIN — ASPIRIN 81 MG: 81 TABLET, COATED ORAL at 08:09

## 2023-11-13 RX ADMIN — HEPARIN SODIUM 5000 UNITS: 5000 INJECTION INTRAVENOUS; SUBCUTANEOUS at 14:00

## 2023-11-13 RX ADMIN — DOXYCYCLINE 100 MG: 100 INJECTION, POWDER, LYOPHILIZED, FOR SOLUTION INTRAVENOUS at 22:18

## 2023-11-13 RX ADMIN — IPRATROPIUM BROMIDE AND ALBUTEROL SULFATE 3 ML: 2.5; .5 SOLUTION RESPIRATORY (INHALATION) at 07:41

## 2023-11-13 RX ADMIN — PANTOPRAZOLE SODIUM 40 MG: 40 TABLET, DELAYED RELEASE ORAL at 06:11

## 2023-11-13 RX ADMIN — GUAIFENESIN 1200 MG: 600 TABLET ORAL at 09:43

## 2023-11-13 RX ADMIN — DOXYCYCLINE 100 MG: 100 INJECTION, POWDER, LYOPHILIZED, FOR SOLUTION INTRAVENOUS at 09:43

## 2023-11-13 RX ADMIN — METHYLPREDNISOLONE SODIUM SUCCINATE 40 MG: 40 INJECTION, POWDER, LYOPHILIZED, FOR SOLUTION INTRAMUSCULAR; INTRAVENOUS at 06:11

## 2023-11-13 RX ADMIN — HEPARIN SODIUM 5000 UNITS: 5000 INJECTION INTRAVENOUS; SUBCUTANEOUS at 22:17

## 2023-11-13 RX ADMIN — HEPARIN SODIUM 5000 UNITS: 5000 INJECTION INTRAVENOUS; SUBCUTANEOUS at 06:11

## 2023-11-13 RX ADMIN — METHYLPREDNISOLONE SODIUM SUCCINATE 40 MG: 40 INJECTION, POWDER, LYOPHILIZED, FOR SOLUTION INTRAMUSCULAR; INTRAVENOUS at 22:17

## 2023-11-13 RX ADMIN — IPRATROPIUM BROMIDE AND ALBUTEROL SULFATE 3 ML: 2.5; .5 SOLUTION RESPIRATORY (INHALATION) at 11:31

## 2023-11-13 ASSESSMENT — ENCOUNTER SYMPTOMS
LIGHT-HEADEDNESS: 0
DIAPHORESIS: 0
COUGH: 1
DIZZINESS: 0
CONSTIPATION: 0
BACK PAIN: 0
DIARRHEA: 0
VOMITING: 0
NUMBNESS: 0
EYE PAIN: 0
FATIGUE: 0
JOINT SWELLING: 0
WHEEZING: 1
BLOOD IN STOOL: 0
WEAKNESS: 0
SHORTNESS OF BREATH: 1
FLANK PAIN: 0
CHILLS: 0
SORE THROAT: 0
PALPITATIONS: 0
APPETITE CHANGE: 0
BRUISES/BLEEDS EASILY: 0
HEMATURIA: 0
ABDOMINAL PAIN: 0
FACIAL SWELLING: 0
TROUBLE SWALLOWING: 0
FREQUENCY: 0
CHEST TIGHTNESS: 0
NAUSEA: 0
DYSURIA: 0
HALLUCINATIONS: 0
HEADACHES: 0
WOUND: 0
FEVER: 0

## 2023-11-13 ASSESSMENT — COGNITIVE AND FUNCTIONAL STATUS - GENERAL
DAILY ACTIVITIY SCORE: 14
PERSONAL GROOMING: A LITTLE
MOVING FROM LYING ON BACK TO SITTING ON SIDE OF FLAT BED WITH BEDRAILS: A LOT
TURNING FROM BACK TO SIDE WHILE IN FLAT BAD: A LITTLE
MOVING TO AND FROM BED TO CHAIR: A LOT
HELP NEEDED FOR BATHING: A LITTLE
MOVING TO AND FROM BED TO CHAIR: A LOT
MOVING FROM LYING ON BACK TO SITTING ON SIDE OF FLAT BED WITH BEDRAILS: A LOT
DRESSING REGULAR UPPER BODY CLOTHING: A LITTLE
STANDING UP FROM CHAIR USING ARMS: A LITTLE
TURNING FROM BACK TO SIDE WHILE IN FLAT BAD: A LOT
MOVING TO AND FROM BED TO CHAIR: A LITTLE
DRESSING REGULAR LOWER BODY CLOTHING: A LITTLE
EATING MEALS: A LITTLE
MOBILITY SCORE: 16
TOILETING: A LITTLE
TURNING FROM BACK TO SIDE WHILE IN FLAT BAD: A LOT
MOBILITY SCORE: 10
CLIMB 3 TO 5 STEPS WITH RAILING: TOTAL
HELP NEEDED FOR BATHING: A LOT
MOVING FROM LYING ON BACK TO SITTING ON SIDE OF FLAT BED WITH BEDRAILS: A LITTLE
DRESSING REGULAR LOWER BODY CLOTHING: TOTAL
DRESSING REGULAR LOWER BODY CLOTHING: TOTAL
CLIMB 3 TO 5 STEPS WITH RAILING: TOTAL
TOILETING: A LOT
EATING MEALS: A LITTLE
WALKING IN HOSPITAL ROOM: TOTAL
WALKING IN HOSPITAL ROOM: TOTAL
HELP NEEDED FOR BATHING: A LOT
DAILY ACTIVITIY SCORE: 20
DRESSING REGULAR UPPER BODY CLOTHING: A LITTLE
DAILY ACTIVITIY SCORE: 14
MOBILITY SCORE: 10
STANDING UP FROM CHAIR USING ARMS: A LOT
TOILETING: A LOT
PERSONAL GROOMING: A LITTLE
DRESSING REGULAR UPPER BODY CLOTHING: A LITTLE
WALKING IN HOSPITAL ROOM: A LOT
STANDING UP FROM CHAIR USING ARMS: A LOT
CLIMB 3 TO 5 STEPS WITH RAILING: A LOT

## 2023-11-13 ASSESSMENT — PAIN - FUNCTIONAL ASSESSMENT: PAIN_FUNCTIONAL_ASSESSMENT: 0-10

## 2023-11-13 ASSESSMENT — PAIN SCALES - GENERAL: PAINLEVEL_OUTOF10: 0 - NO PAIN

## 2023-11-13 ASSESSMENT — ACTIVITIES OF DAILY LIVING (ADL): ADL_ASSISTANCE: INDEPENDENT

## 2023-11-13 NOTE — CARE PLAN
Problem: Mobility  Goal: STG - Patient will ambulate  Description: FWW MIN X1 50 FT  Outcome: Not Progressing  Goal: STRENGTHENING  Description: 20 REPS AROM/RROM EX INCREASING STRENGTH TO PROGRESS OOB ACTIVITIES  Outcome: Not Progressing     Problem: Transfers  Goal: STG - Patient to transfer to and from sit to supine  Description: MIN X1 RAIL HOB FLAT  Outcome: Not Progressing  Goal: STG - Patient will transfer sit to and from stand  Description: FWW CGA USING SAFE TECHNIQUES  Outcome: Not Progressing

## 2023-11-13 NOTE — ASSESSMENT & PLAN NOTE
-Continued on home therapies, Mery  -Completed doxycycline  -RT c/s appreciated   -Pulm hygiene       Resolved, started on oral prednisone 11/15- taper on dc

## 2023-11-13 NOTE — PROGRESS NOTES
"Hai Laboy is a 78 y.o. male on day 1 of admission presenting with Altered mental status, unspecified altered mental status type.      Subjective   Hai Laboy is a 78 y.o. male with PMHx s/f HTN, HLD, COPD (no baseline O2), prior stroke, prostate cancer, and recent admission to [facility] for AMS/PNA/hypoxia, presented 11/12/23 with change in mental status and shortness of breath. Patient is oriented to self, can tell me he is in a hospital in [place], and can report the president as Reanna; however, he reports the year is [id number] and it is February and becomes quickly frustrated with questions, stating \"I don't know, why are you asking me?\". Patient presents via EMS for a reported \"slide\" out of his chair at home while attempting to get his inhaler while feeling short of breath. Pt would not elaborate for me on the fall, but did admit he has been feeling increasingly SOB over the last few days, but especially today. He is reportedly living at home but has a friend that helps check on him. He is following commands appropriately but does easily get frustrated. He feels SOB has improved since presentation and believes he does need oxygen at home or he will come back time and time again. Initial VBG with a pH of 7.32, PCO2 59, PO2 28, calculated bicarb 30.4.  Repeat with a pH of 7.40, PCO2 47, PO2 55, calculated bicarb 29.1. Lactate downtrending from 3.1-2.1. CXR without acute process.  CT head without IV contrast negative for acute process, is notable for chronic changes and ventricular prominence which was noted during last hospitalization. TSH WNL. UA without UTI.     11/13/23:  No acute events overnight. Vitals stable, 100% on 1L NC during exam. Labs largely unremarkable. Patietn asking for discharge to home, still states it is 2003, will need to follow up with his friend \"Angel\" who cares for him to determine if discharge back to home is safe.          Review of Systems   Constitutional:  Negative for appetite " change, chills, diaphoresis, fatigue and fever.   HENT:  Negative for congestion, ear pain, facial swelling, hearing loss, nosebleeds, sore throat, tinnitus and trouble swallowing.    Eyes:  Negative for pain.   Respiratory:  Positive for cough, shortness of breath (On exertion) and wheezing. Negative for chest tightness.    Cardiovascular:  Negative for chest pain, palpitations and leg swelling.   Gastrointestinal:  Negative for abdominal pain, blood in stool, constipation, diarrhea, nausea and vomiting.   Genitourinary:  Negative for dysuria, flank pain, frequency, hematuria and urgency.   Musculoskeletal:  Negative for back pain and joint swelling.   Skin:  Negative for rash and wound.   Neurological:  Negative for dizziness, syncope, weakness, light-headedness, numbness and headaches.   Hematological:  Does not bruise/bleed easily.   Psychiatric/Behavioral:  Negative for behavioral problems, hallucinations and suicidal ideas.           Objective     Last Recorded Vitals  /52 (BP Location: Right arm, Patient Position: Lying)   Pulse 75   Temp 36.8 °C (98.3 °F) (Temporal)   Resp 17   Wt 56.1 kg (123 lb 10.9 oz)   SpO2 98%     Image Results  CT head wo IV contrast    Result Date: 11/12/2023  Interpreted By:  Sabrina Ramirez, STUDY: CT HEAD WO IV CONTRAST;  11/12/2023 6:01 pm   INDICATION: Signs/Symptoms:confusion.   COMPARISON: 10/16/2023   ACCESSION NUMBER(S): [id number]   ORDERING CLINICIAN: CASSIDY MEREDITH   TECHNIQUE: Axial noncontrast CT images of the head.   FINDINGS: BRAIN PARENCHYMA: Extensive parenchymal volume loss, nonspecific white matter changes and calcifications of the carotid arteries. No mass effect or midline shift. Focus of encephalomalacia in the right cerebellar hemisphere and extending from the left middle cerebellar peduncle into the left cerebellar hemisphere, similar to prior imaging. Punctate calcifications within the right sulci similar to prior imaging.   HEMORRHAGE: No acute  intracranial hemorrhage. VENTRICLES and EXTRA-AXIAL SPACES: Margin of the lateral and 3rd ventricles are again noted which may be seen with central white matter loss or normal pressure hydrocephalus.. Ex vacuo dilatation of the 4th ventricle likely related to prior infarct. EXTRACRANIAL SOFT TISSUES: Within normal limits. PARANASAL SINUSES/MASTOIDS: The visualized paranasal sinuses and mastoid air cells are aerated. CALVARIUM: No depressed skull fracture. No destructive osseous lesion.   OTHER FINDINGS: None.       No acute intracranial hemorrhage or mass effect.   Chronic white matter changes and sequela of remote posterior fossa infarcts again noted.   Ventricular prominence again noted.   MACRO: None.   Signed by: Sabrina Ramirez 11/12/2023 6:33 PM Dictation workstation:   [id number]    XR chest 1 view    Result Date: 11/12/2023  Interpreted By:  Bhavesh Barros, STUDY: XR CHEST 1 VIEW;  11/12/2023 4:33 pm   INDICATION: Signs/Symptoms:cough, sob.   COMPARISON: 11/07/2023.   ACCESSION NUMBER(S): [id number]   ORDERING CLINICIAN: CASSIDY MEREDITH   FINDINGS: No consolidation. No pleural effusion or pneumothorax. Hyperinflated lungs suggestive of COPD. Right-sided pleural calcifications unchanged. Left pleural calcification also present. Asymmetric elevation of the left hemidiaphragm. Atherosclerosis. Normal heart size. No acute osseous abnormality.       No acute cardiopulmonary abnormality.   Signed by: Bhavesh Barros 11/12/2023 4:45 PM Dictation workstation:   [id number]    CT angio chest for pulmonary embolism    Result Date: 11/7/2023  Interpreted By:  Ramana Ortega, STUDY: CT ANGIO CHEST FOR PULMONARY EMBOLISM; 11/7/2023 1:10 pm   INDICATION: Signs/Symptoms:SOB.   COMPARISON: CT chest dated 10/16/2023.   ACCESSION NUMBER(S): [id number]   ORDERING CLINICIAN: TRISHA LANDON   TECHNIQUE: Contiguous axial CT images were obtained through the chest at 2 mm slice thickness following administration of  intravenous contrast utilizing pulmonary artery bolus tracking. 50 cc of Omnipaque 350 was administered. The images were then reconstructed in the coronal and sagittal planes. MIP images were created and reviewed.   FINDINGS: POTENTIAL LIMITATIONS OF THE STUDY: None   HEART and VESSELS: There is dense opacification of the pulmonary arterial system. No intraluminal filling defect is seen within the pulmonary arteries to suggest acute pulmonary embolus.   The heart is within normal limits for size. No pericardial effusion is identified. Dense atherosclerotic calcifications are seen in the coronary arteries. Mild aortic valve calcifications are present.   Moderate atherosclerotic calcifications are seen in the aortic arch and descending thoracic aorta , including at the origins of the arch vessels. The thoracic aorta is within normal limits for course and caliber, without evidence of aneurysm.   MEDIASTINUM and SHANTA, LOWER NECK AND AXILLA: Evaluation of the visualized neck base demonstrates no gross mass or lymphadenopathy.   There is no gross axillary, hilar or mediastinal lymphadenopathy identified.   LUNGS and AIRWAYS: The trachea and central airways are grossly patent without evidence of endobronchial lesion.   Mild-to-moderate emphysematous changes are seen in the lungs bilaterally. Focal airspace consolidation is seen in the posteromedial aspect of the left mid lung, similar to the prior study, and may represent scarring/chronic atelectasis. Dense pleural calcifications are seen in the right lung. No discrete pulmonary nodules or masses are seen, though evaluation is limited within the regions of airspace consolidation..   UPPER ABDOMEN: Evaluation of the visualized upper abdomen demonstrates no discrete mass or lymphadenopathy.   CHEST WALL and OSSEOUS STRUCTURES: There is no evidence of acute fracture identified. Multilevel degenerative changes are seen in the thoracic spine.       1. No evidence of acute  pulmonary embolus. 2. Left lower lobe airspace consolidation, most consistent with scarring/chronic atelectasis. 3. Emphysematous changes in the lungs bilaterally. 4. Extensive pleural calcifications in the right chest.   MACRO: None.     Signed by: Ramana Ortega 11/7/2023 1:30 PM Dictation workstation:   [id number]    XR chest 2 views    Result Date: 11/7/2023  Interpreted By:  Aretha Skelton, STUDY: XR CHEST 2 VIEWS;  11/7/2023 1:11 pm   INDICATION: Signs/Symptoms:cough.   COMPARISON: 10/16/2023   ACCESSION NUMBER(S): [id number]   ORDERING CLINICIAN: TRISHA LANDON   FINDINGS: Prominent calcified pleural plaques are seen. Emphysematous changes are noted. Elevation of the left hemidiaphragm is again seen, which is unchanged. The heart is not enlarged. No consolidation, pleural effusion pneumothorax is noted.       Chronic lung changes.     MACRO: None   Signed by: Aretha Skelton 11/7/2023 1:16 PM Dictation workstation:   [id number]    CT head wo IV contrast    Result Date: 10/16/2023  Interpreted By:  Topher Hendricks, STUDY: CT HEAD WO IV CONTRAST;  10/16/2023 8:05 pm   INDICATION: Signs/Symptoms:new confusion.   COMPARISON: CT head 06/07/2023   ACCESSION NUMBER(S): [id number]   ORDERING CLINICIAN: FRANKLIN WELCH   TECHNIQUE: Noncontrast axial CT images of head were obtained with coronal and sagittal reconstructed images.   FINDINGS: BRAIN PARENCHYMA: Mild generalized cerebral volume loss. No evidence of acute large territory infarction or transcortical edema. The deep gray structures are preserved. No mass-effect, midline shift or effacement of cerebral sulci. Confluent periventricular and subcortical white matter hypodensities, nonspecific but often seen in the setting of chronic microangiopathic disease.   HEMORRHAGE: No acute intracranial hemorrhage.   VENTRICLES and EXTRA-AXIAL SPACES: Prominence of the lateral and 3rd ventricles greater than expected for degree of cerebral volume loss. No abnormal  extra-axial fluid collection.   ORBITS: The visualized orbits and globes are within normal limits.   EXTRACRANIAL SOFT TISSUES: Within normal limits.   PARANASAL SINUSES/MASTOIDS: The visualized paranasal sinuses and mastoid air cells are well aerated.   CALVARIUM: No depressed skull fracture.   Brain Injury Guidelines (BIG) CT Values:   Skull fracture: No SDH (subdural hematoma): No EDH (epidural hematoma): No IPH (intraparenchymal hemorrhage): No SAH (subarachnoid hemorrhage): No IVH (intraventricular hemorrhage): No   Reference: Carl JENKINS, Judy RS, Everton M, et al. The BIG (brain injury guidelines) project: defining the management of traumatic brain injury by acute care surgeons. J Trauma Acute Care Surg 2014; 76:965-969.       1. No acute intracranial abnormality identified. 2. Ventricular prominence greater than expected for degree of cerebral volume loss, consider normal pressure hydrocephalus or other hydrocephalus. 3. Chronic white matter changes likely related to small-vessel ischemic disease.       MACRO: None   Signed by: Topher Hendricks 10/16/2023 8:45 PM Dictation workstation:   [id number]    CT angio chest for pulmonary embolism    Result Date: 10/16/2023  Interpreted By:  Topher Hendricks, STUDY: CT ANGIO CHEST FOR PULMONARY EMBOLISM;  10/16/2023 8:05 pm   INDICATION: Signs/Symptoms:SOB, elevated dimer.   COMPARISON: CT chest 03/09/2023   ACCESSION NUMBER(S): [id number]   ORDERING CLINICIAN: TOPEHR WANG   TECHNIQUE: Contiguous axial images of the chest were obtained after the intravenous administration of 75 mL Omnipaque 350 contrast using angiographic PE protocol. Coronal and sagittal reformatted images were reconstructed from the axial data. MIP images were created on an independent workstation and reviewed.   FINDINGS: PULMONARY ARTERIES: Adequate opacification to the level of the segmental arteries. The subsegmental arteries are suboptimally assessed due to mixing artifact and  respiratory motion. No filling defect to suggest pulmonary embolus in the visualized pulmonary arteries. The main pulmonary artery is normal in diameter. No CT evidence of right heart strain.   HEART: Normal in size. Moderate to heavy triple-vessel coronary vascular calcifications. Calcifications in the plane of the mitral valve. No significant pericardial effusion.   VESSELS: Normal caliber aorta without dissection. Heavy calcifications of the aortic arch and proximal great vessels as well as the descending thoracic aorta.   MEDIASTINUM AND LYMPH NODES: Visualized thyroid is within normal limits. No enlarged intrathoracic or axillary lymph nodes by imaging criteria. No pneumomediastinum. The esophagus appears within normal limits.   LUNG, AIRWAYS, AND PLEURA: Dependent debris within the distal trachea. Left lower lobe bronchial wall thickening with adjacent patchy and consolidative opacities. There is a background of diffuse centrilobular and paraseptal emphysematous change. Bibasilar scarring. Bandlike scarring in the right apex. No pleural effusion or pneumothorax. Scattered bilateral pleural calcifications most pronounced along the pleural surfaces of the right lower lobe. Findings suggest prior asbestos exposure.   OSSEOUS STRUCTURES: No acute osseous abnormality.   CHEST WALL SOFT TISSUES: No discernible abnormality.   UPPER ABDOMEN/OTHER: Heavy vascular calcifications in the upper abdomen.       1. No evidence of pulmonary embolus to the level of the segmental arteries. Filling defects within the subsegmental arteries can not be entirely excluded. 2. Bronchial wall thickening in the medial left lower lobe with adjacent patchy and consolidative opacities. Findings are most suggestive of pneumonia or aspiration pneumonitis. Follow-up is recommended to ensure resolution and exclude underlying lesion. 3. Diffuse background of emphysematous changes and pleural calcifications suggestive of prior asbestos exposure.  4. Please see additional findings and discussion as above.   MACRO: None   Signed by: Topher Hendricks 10/16/2023 8:36 PM Dictation workstation:   [id number]    XR chest 1 view    Result Date: 10/16/2023  Interpreted By:  Paco Andrews, STUDY: XR CHEST 1 VIEW   INDICATION: Signs/Symptoms:hypoxia, confusion.   COMPARISON: June 7, 2023   ACCESSION NUMBER(S): [id number]   ORDERING CLINICIAN: FRANKLIN WELCH   FINDINGS: Extensive pleural-based calcifications in the bilateral lungs right worse than left, similar to prior study.   No new consolidation or edema. No evidence of pneumothorax or effusion.       Extensive calcified pleural plaques suggesting asbestos exposure similar to prior study. No evidence of acute intrathoracic abnormality.   Signed by: Paco Andrews 10/16/2023 5:27 PM Dictation workstation:   [id number]       Lab Results  Results for orders placed or performed during the hospital encounter of 11/12/23 (from the past 24 hour(s))   CBC and Auto Differential   Result Value Ref Range    WBC 9.3 4.4 - 11.3 x10*3/uL    nRBC 0.0 0.0 - 0.0 /100 WBCs    RBC 4.55 4.50 - 5.90 x10*6/uL    Hemoglobin 14.2 13.5 - 17.5 g/dL    Hematocrit 42.3 41.0 - 52.0 %    MCV 93 80 - 100 fL    MCH 31.2 26.0 - 34.0 pg    MCHC 33.6 32.0 - 36.0 g/dL    RDW 14.5 11.5 - 14.5 %    Platelets 224 150 - 450 x10*3/uL    Neutrophils % 90.7 40.0 - 80.0 %    Immature Granulocytes %, Automated 0.6 0.0 - 0.9 %    Lymphocytes % 6.4 13.0 - 44.0 %    Monocytes % 2.0 2.0 - 10.0 %    Eosinophils % 0.2 0.0 - 6.0 %    Basophils % 0.1 0.0 - 2.0 %    Neutrophils Absolute 8.44 (H) 1.60 - 5.50 x10*3/uL    Immature Granulocytes Absolute, Automated 0.06 0.00 - 0.50 x10*3/uL    Lymphocytes Absolute 0.60 (L) 0.80 - 3.00 x10*3/uL    Monocytes Absolute 0.19 0.05 - 0.80 x10*3/uL    Eosinophils Absolute 0.02 0.00 - 0.40 x10*3/uL    Basophils Absolute 0.01 0.00 - 0.10 x10*3/uL   Comprehensive metabolic panel   Result Value Ref Range    Glucose 112 (H) 74 - 99 mg/dL     Sodium 142 136 - 145 mmol/L    Potassium 4.4 3.5 - 5.3 mmol/L    Chloride 105 98 - 107 mmol/L    Bicarbonate 31 21 - 32 mmol/L    Anion Gap 10 10 - 20 mmol/L    Urea Nitrogen 19 6 - 23 mg/dL    Creatinine 1.03 0.50 - 1.30 mg/dL    eGFR 73 >60 mL/min/1.73m*2    Calcium 9.1 8.6 - 10.3 mg/dL    Albumin 4.2 3.4 - 5.0 g/dL    Alkaline Phosphatase 105 33 - 136 U/L    Total Protein 6.9 6.4 - 8.2 g/dL    AST 17 9 - 39 U/L    Bilirubin, Total 0.6 0.0 - 1.2 mg/dL    ALT 15 10 - 52 U/L   Lipase   Result Value Ref Range    Lipase 31 9 - 82 U/L   Troponin I, High Sensitivity   Result Value Ref Range    Troponin I, High Sensitivity 8 0 - 20 ng/L   Blood Gas Venous Full Panel   Result Value Ref Range    POCT pH, Venous 7.32 (L) 7.33 - 7.43 pH    POCT pCO2, Venous 59 (H) 41 - 51 mm Hg    POCT pO2, Venous 28 (L) 35 - 45 mm Hg    POCT SO2, Venous 37 (L) 45 - 75 %    POCT Oxy Hemoglobin, Venous 36.9 (L) 45.0 - 75.0 %    POCT Hematocrit Calculated, Venous 44.0 41.0 - 52.0 %    POCT Sodium, Venous 141 136 - 145 mmol/L    POCT Potassium, Venous 3.9 3.5 - 5.3 mmol/L    POCT Chloride, Venous 105 98 - 107 mmol/L    POCT Ionized Calicum, Venous 1.16 1.10 - 1.33 mmol/L    POCT Glucose, Venous 104 (H) 74 - 99 mg/dL    POCT Lactate, Venous 3.1 (H) 0.4 - 2.0 mmol/L    POCT Base Excess, Venous 2.6 -2.0 - 3.0 mmol/L    POCT HCO3 Calculated, Venous 30.4 (H) 22.0 - 26.0 mmol/L    POCT Hemoglobin, Venous 14.6 13.5 - 17.5 g/dL    POCT Anion Gap, Venous 10.0 10.0 - 25.0 mmol/L    Patient Temperature 37.0 degrees Celsius    FiO2 100 %   Lactate   Result Value Ref Range    Lactate 1.9 0.4 - 2.0 mmol/L   Creatine Kinase   Result Value Ref Range    Creatine Kinase 25 0 - 325 U/L   SARS-CoV-2 RT PCR   Result Value Ref Range    Coronavirus 2019, PCR Not Detected Not Detected   Influenza A, and B PCR   Result Value Ref Range    Flu A Result Not Detected Not Detected    Flu B Result Not Detected Not Detected   Blood Culture    Specimen: Peripheral  Venipuncture; Blood culture   Result Value Ref Range    Blood Culture Loaded on Instrument - Culture in progress    Blood Culture    Specimen: Peripheral Venipuncture; Blood culture   Result Value Ref Range    Blood Culture Loaded on Instrument - Culture in progress    Blood Gas Lactic Acid, Venous   Result Value Ref Range    POCT Lactate, Venous 2.1 (H) 0.4 - 2.0 mmol/L   Blood Gas Venous   Result Value Ref Range    POCT pH, Venous 7.40 7.33 - 7.43 pH    POCT pCO2, Venous 47 41 - 51 mm Hg    POCT pO2, Venous 55 (H) 35 - 45 mm Hg    POCT SO2, Venous 84 (H) 45 - 75 %    POCT Oxy Hemoglobin, Venous 82.5 (H) 45.0 - 75.0 %    POCT Base Excess, Venous 3.5 (H) -2.0 - 3.0 mmol/L    POCT HCO3 Calculated, Venous 29.1 (H) 22.0 - 26.0 mmol/L    Patient Temperature 37.0 degrees Celsius    FiO2 21 %    Test Comment [facility] 2-S    CBC   Result Value Ref Range    WBC 5.4 4.4 - 11.3 x10*3/uL    nRBC 0.0 0.0 - 0.0 /100 WBCs    RBC 3.61 (L) 4.50 - 5.90 x10*6/uL    Hemoglobin 11.1 (L) 13.5 - 17.5 g/dL    Hematocrit 33.4 (L) 41.0 - 52.0 %    MCV 93 80 - 100 fL    MCH 30.7 26.0 - 34.0 pg    MCHC 33.2 32.0 - 36.0 g/dL    RDW 14.1 11.5 - 14.5 %    Platelets 188 150 - 450 x10*3/uL   Basic Metabolic Panel   Result Value Ref Range    Glucose 182 (H) 74 - 99 mg/dL    Sodium 141 136 - 145 mmol/L    Potassium 4.2 3.5 - 5.3 mmol/L    Chloride 107 98 - 107 mmol/L    Bicarbonate 27 21 - 32 mmol/L    Anion Gap 11 10 - 20 mmol/L    Urea Nitrogen 21 6 - 23 mg/dL    Creatinine 0.92 0.50 - 1.30 mg/dL    eGFR 84 >60 mL/min/1.73m*2    Calcium 8.4 (L) 8.6 - 10.3 mg/dL   Magnesium   Result Value Ref Range    Magnesium 1.91 1.60 - 2.40 mg/dL   TSH with reflex to Free T4 if abnormal   Result Value Ref Range    Thyroid Stimulating Hormone 0.56 0.44 - 3.98 mIU/L   Urinalysis with Reflex Microscopic and Culture   Result Value Ref Range    Color, Urine Nathalie (N) Straw, Yellow    Appearance, Urine Hazy (N) Clear    Specific Gravity, Urine 1.029 1.005 - 1.035     pH, Urine 5.0 5.0, 5.5, 6.0, 6.5, 7.0, 7.5, 8.0    Protein, Urine NEGATIVE NEGATIVE mg/dL    Glucose, Urine 50 (1+) (A) NEGATIVE mg/dL    Blood, Urine NEGATIVE NEGATIVE    Ketones, Urine 5 (TRACE) (A) NEGATIVE mg/dL    Bilirubin, Urine NEGATIVE NEGATIVE    Urobilinogen, Urine 2.0 (N) <2.0 mg/dL    Nitrite, Urine NEGATIVE NEGATIVE    Leukocyte Esterase, Urine NEGATIVE NEGATIVE        Medications  Scheduled medications:  aspirin, 81 mg, oral, Daily  atorvastatin, 10 mg, oral, Daily  clopidogrel, 75 mg, oral, Daily  doxycycline (Vibramycin) 100 mg in sodium chloride 0.9 % 100 mL IV, 100 mg, intravenous, q12h  tiotropium, 2 Inhalation, inhalation, Daily   And  fluticasone furoate-vilanteroL, 1 puff, inhalation, Daily  guaiFENesin, 1,200 mg, oral, BID  heparin (porcine), 5,000 Units, subcutaneous, q8h  ipratropium-albuteroL, 3 mL, nebulization, TID  lisinopril, 40 mg, oral, Daily  melatonin, 3 mg, oral, Daily  methylPREDNISolone sodium succinate (PF), 40 mg, intravenous, q8h CAITY  pantoprazole, 40 mg, oral, Daily before breakfast   Or  pantoprazole, 40 mg, intravenous, Daily before breakfast  polyethylene glycol, 17 g, oral, Daily      Continuous medications:     PRN medications:  PRN medications: acetaminophen, albuterol, bisacodyl, bisacodyl, ondansetron **OR** ondansetron     Physical Exam  Constitutional:       General: He is not in acute distress.     Appearance: Normal appearance.      Comments: Cachexia   HENT:      Head: Normocephalic and atraumatic.      Right Ear: External ear normal.      Left Ear: External ear normal.      Nose: Nose normal.      Mouth/Throat:      Mouth: Mucous membranes are moist.      Pharynx: Oropharynx is clear.   Eyes:      Extraocular Movements: Extraocular movements intact.      Conjunctiva/sclera: Conjunctivae normal.      Pupils: Pupils are equal, round, and reactive to light.   Cardiovascular:      Rate and Rhythm: Normal rate and regular rhythm.      Pulses: Normal pulses.       Heart sounds: Normal heart sounds.   Pulmonary:      Effort: Pulmonary effort is normal. No respiratory distress.      Breath sounds: Wheezing present. No rhonchi or rales.      Comments: NC in place, on 1L  Abdominal:      General: Abdomen is flat. Bowel sounds are normal.      Palpations: Abdomen is soft.      Tenderness: There is no abdominal tenderness. There is no right CVA tenderness, left CVA tenderness, guarding or rebound.   Musculoskeletal:         General: No swelling. Normal range of motion.      Cervical back: Normal range of motion and neck supple.   Skin:     General: Skin is warm and dry.      Capillary Refill: Capillary refill takes less than 2 seconds.      Findings: No lesion or rash.   Neurological:      General: No focal deficit present.      Mental Status: He is alert. Mental status is at baseline. He is disoriented.      Comments: Oriented to person, place, situation  States it is 2005   Psychiatric:         Mood and Affect: Mood normal.         Behavior: Behavior normal.                  Code Status  Full Code     Assessment/Plan      Acute exacerbation of chronic obstructive pulmonary disease (COPD) (CMS/[facility])  Assessment & Plan  -Pt with wheezing on exam and overall diminished breath sounds  -Imaging and labs otherwise unrevealing   -Continued on home therapies, DuoNebs  -Continued on IV steroids  -Empiric doxycycline   -RT c/s appreciated   -Pulm hygiene     Acute hypoxic respiratory failure (CMS/[facility])  Assessment & Plan  -Treat COPD  -Wean O2 as able  -Home O2 eval prior to discharge     HTN (hypertension)  Assessment & Plan  -Confirm and resume home therapies  -BP slightly elevated during presentation, suspect stress related. Continue to follow.     History of stroke  Assessment & Plan  -Continue DAPT, continue statin   -Continue follow-up as mentioned above     Acute metabolic encephalopathy  Assessment & Plan  -CT head with ventricular prominence as noted during last hospitalization.   Neurology recommended management of any acute issues and to continue dual antiplatelet and statin, have follow-up with neurosurgery in the outpatient setting in the future.  -UA negative for UTI  -TSH WNL  -No obvious focal or lateralizing deficits, appears patient is back to what appears to be his most recent baseline from his last hospitalization    Cerebral ventriculomegaly  Assessment & Plan  -CT head with ventricular prominence as noted during last hospitalization.  Neurology recommended management of any acute issues and to continue dual antiplatelet and statin, have follow-up with neurosurgery in the outpatient setting in the future.    Prostate cancer (CMS/HCC)  Assessment & Plan  -Follow-up as directed     Hyperlipidemia  Assessment & Plan  -Confirm and resume home therapies                 DVT ppx: subcutaneous heparin       Please see orders for more complete plan    Tressa Davila PA-C

## 2023-11-13 NOTE — PROGRESS NOTES
Physical Therapy    Physical Therapy Evaluation    Patient Name: Hai Laboy  MRN: [id number]  Today's Date: 11/13/2023   Time Calculation  Start Time: 1113  Stop Time: 1137  Time Calculation (min): 24 min    Assessment/Plan   PT Assessment  PT Assessment Results: Decreased strength, Decreased endurance, Impaired balance, Decreased mobility, Decreased cognition, Impaired judgement, Decreased safety awareness, Impaired hearing  Rehab Prognosis: Fair  Evaluation/Treatment Tolerance: Patient limited by fatigue  Medical Staff Made Aware: Yes  End of Session Communication: Bedside nurse  Assessment Comment: NEEDS 2 PERSONS TO AMB TO CHAIR FWW, POOR AMB SEQUENCING & STABITLY, FATIGUED QUICKLY DIFFICUTY FOLLOWING DIRECTIONS,  HIGH FALL RISK, NEEDS SKILLED REHAB ON DISCH  End of Session Patient Position: Up in chair, Alarm on (CALL LIGHT IN REACH)  IP OR SWING BED PT PLAN  Inpatient or Swing Bed: Inpatient  PT Plan  Treatment/Interventions: Bed mobility, Transfer training, Gait training, Strengthening  PT Plan: Skilled PT  PT Frequency: 3 times per week  PT Discharge Recommendations: Moderate intensity level of continued care  Equipment Recommended upon Discharge:  (TBD)  PT Recommended Transfer Status: Assist x2 (FWW)      Subjective   General Visit Information:  General  Reason for Referral: IMPAIRED MOBILITY  Referred By: [facility]  Past Medical History Relevant to Rehab: SOB, FALL, AMS; DX: METABOLIC ENCEPHALOPATHY, COPD EXACERBATION, RESPIRATORY FAILURE, CEREBRAL VENTRICULOMEGALY; HX: C DIFF, URINARY INCONTINENCE, WRIST SURG, HTN, COPD  Co-Treatment: OT  Co-Treatment Reason: FLL RISK NEEDS 2 PERSONS FOR SAFE MOBILITY  Prior to Session Communication: Bedside nurse  Patient Position Received: Bed, 3 rail up, Alarm on (ROOM 3327 ALERT IV)  General Comment: AGITATED EASILY  Home Living:  Home Living  Home Living Comments:  (ALONE, 2 LEVEL HOME STAYS  FIRST FLOOR, 3-4 ANA, AMB W/ FWW, INDEP ADL, FRIENDS A W/ MEALS,  CHORES ADN TRANSPORTATION)       Precautions:  Precautions  Hearing/Visual Limitations: [place]  Medical Precautions: Fall precautions         Objective   Pain:  Pain Assessment  Pain Assessment:  (C/O PAIN IN R HAND 2/2 IV DID NOT RATE)  Cognition:  Cognition  Overall Cognitive Status: Impaired  Orientation Level: Disoriented to time, Disoriented to place, Disoriented to situation (NEEDS VC TO STATE HE IS IN HOSPITAL, STATES FEB 2005 AS DATE)  Attention: Exceptions to WFL  Sustained Attention: Impaired  Memory: Exceptions to WFL  Long-Term Memory: Impaired  Short-Term Memory: Impaired  Safety/Judgement: Exceptions to WFL  Novel Situations: Moderate  Routine Tasks: Moderate  Insight: Moderate  Impulsive: Moderately  Flexibility of Thought: Reduced flexibility  Planning: Reduced planning skills  Organization: Moderately disorganized  Processing Speed: Delayed                    Activity Tolerance  Endurance: Tolerates 10 - 20 min exercise with multiple rests    Sensation  Sensation Comment: NO C/O    Strength  Strength Comments: ROM LEGS WFL,  STRENGTH IS 3/5                Postural Control  Posture Comment: WFL    Static Sitting Balance  Static Sitting-Comment/Number of Minutes: FAIR  Dynamic Sitting Balance  Dynamic Sitting-Comments: FAIR/FAIR-    Static Standing Balance  Static Standing-Comment/Number of Minutes: POOR  Dynamic Standing Balance  Dynamic Standing-Comments: POOR  Functional Assessments:  Bed Mobility  Bed Mobility:  (MIN X2 A USING PADS, HOB 45 DEGREES TO GET TO EOB, SAT EOB FOR TOTAL 5MIN)    Transfers  Transfer:  (MIN X2 TO STAND FWW, MILD RETRO LEAN)    Ambulation/Gait Training  Ambulation/Gait Training Performed:  (FWW, MIN X2 A, WIDE TEJINDER SHUFFLE GAIT HAS DIFFICUTLY SEQUENCING GAIT PRISCA BACKWARDS, LEGSUNSTEADY AMB 4 FT TOTAL)                      Outcome Measures:  [facility] Basic Mobility  Turning from your back to your side while in a flat bed without using bedrails: A lot  Moving from lying on your  back to sitting on the side of a flat bed without using bedrails: A lot  Moving to and from bed to chair (including a wheelchair): A lot  Standing up from a chair using your arms (e.g. wheelchair or bedside chair): A lot  To walk in hospital room: Total  Climbing 3-5 steps with railing: Total  Basic Mobility - Total Score: 10    Encounter Problems       Encounter Problems (Active)       Mobility       STG - Patient will ambulate (Not Progressing)       Start:  11/13/23    Expected End:  11/27/23       FWW MIN X1 50 FT         STRENGTHENING (Not Progressing)       Start:  11/13/23    Expected End:  12/04/23       20 REPS AROM/RROM EX INCREASING STRENGTH TO PROGRESS OOB ACTIVITIES            Transfers       STG - Patient to transfer to and from sit to supine (Not Progressing)       Start:  11/13/23    Expected End:  11/27/23       MIN X1 RAIL HOB FLAT         STG - Patient will transfer sit to and from stand (Not Progressing)       Start:  11/13/23    Expected End:  11/22/23       FWW CGA USING SAFE TECHNIQUES                Education Documentation  Mobility Training, taught by Morena Frey PT at 11/13/2023  1:51 PM.  Learner: Patient  Readiness: Acceptance  Method: Explanation  Response: Needs Reinforcement  Comment: MOBILITYY SAFETY    Education Comments  No comments found.

## 2023-11-13 NOTE — ASSESSMENT & PLAN NOTE
-CT head with ventricular prominence as noted during last hospitalization.  Neurology recommended management of any acute issues and to continue dual antiplatelet and statin, have follow-up with neurosurgery in the outpatient setting in the future.  -UA negative for UTI  -TSH WNL  -No obvious focal or lateralizing deficits, appears patient is back to what appears to be his most recent baseline from his last hospitalization

## 2023-11-13 NOTE — H&P
"History Of Present Illness:  Hai Laboy is a 78 y.o. male with PMHx s/f HTN, HLD, COPD (no baseline O2), prior stroke, prostate cancer, and recent admission to [facility] for AMS/PNA/hypoxia, presenting with change in mental status and shortness of breath. Patient is oriented to self, can tell me he is in a hospital in [place], and can report the president as Reanna; however, he reports the year is 9948-0430 and it is February and becomes quickly frustrated with questions, stating \"I don't know, why are you asking me?\" Collateral history obtained from chart review, discussion with ED physician, and his nurse in the ED. In short, patient presents via EMS for a reported \"slide\" out of his chair at home while attempting to get his inhaler while feeling short of breath. Pt would not elaborate for me on the fall, but did admit he has been feeling increasingly SOB over the last few days, but especially today. He is reportedly living at home but has a friend that helps check on him. He denies any CP, palpitations, fevers, chills, nausea, vomiting. He is following commands appropriately but does easily get frustrated. He feels SOB has improved since presentation and believes he does need oxygen at home or he will come back time and time again.     ED Course (Summary):   Vitals on presentation: T98.3, BP 1 5459, HR 69, RR 20, SPO2 99% 2 L nasal cannula  CBC with differential relatively benign.  Chemistries also relatively unremarkable.  Viral panel negative.  Initial VBG with a pH of 7.32, PCO2 59, PO2 28, calculated bicarb 30.4.  Repeat with a pH of 7.40, PCO2 47, PO2 55, calculated bicarb 29.1.  Lactate downtrending from 3.1-2.1  Imaging: CXR without acute process.  CT head without IV contrast negative for acute process, is notable for chronic changes and ventricular prominence which was noted during last hospitalization.  In the ED, patient was given nebs and steroids.  Blood cultures obtained.  UA still pending.    ED " Course:  ED Course as of 11/12/23 2137   [place] Nov 12, 2023   4939 IMPRESSION:  No acute intracranial hemorrhage or mass effect.      Chronic white matter changes and sequela of remote posterior fossa  infarcts again noted.      Ventricular prominence again noted.   [CF]      ED Course User Index  [CF] Ava Crisostomo MD         Diagnoses as of 11/12/23 2137   Altered mental status, unspecified altered mental status type   COPD exacerbation (CMS/[facility])     Relevant Results  Results for orders placed or performed during the hospital encounter of 11/12/23 (from the past 24 hour(s))   CBC and Auto Differential   Result Value Ref Range    WBC 9.3 4.4 - 11.3 x10*3/uL    nRBC 0.0 0.0 - 0.0 /100 WBCs    RBC 4.55 4.50 - 5.90 x10*6/uL    Hemoglobin 14.2 13.5 - 17.5 g/dL    Hematocrit 42.3 41.0 - 52.0 %    MCV 93 80 - 100 fL    MCH 31.2 26.0 - 34.0 pg    MCHC 33.6 32.0 - 36.0 g/dL    RDW 14.5 11.5 - 14.5 %    Platelets 224 150 - 450 x10*3/uL    Neutrophils % 90.7 40.0 - 80.0 %    Immature Granulocytes %, Automated 0.6 0.0 - 0.9 %    Lymphocytes % 6.4 13.0 - 44.0 %    Monocytes % 2.0 2.0 - 10.0 %    Eosinophils % 0.2 0.0 - 6.0 %    Basophils % 0.1 0.0 - 2.0 %    Neutrophils Absolute 8.44 (H) 1.60 - 5.50 x10*3/uL    Immature Granulocytes Absolute, Automated 0.06 0.00 - 0.50 x10*3/uL    Lymphocytes Absolute 0.60 (L) 0.80 - 3.00 x10*3/uL    Monocytes Absolute 0.19 0.05 - 0.80 x10*3/uL    Eosinophils Absolute 0.02 0.00 - 0.40 x10*3/uL    Basophils Absolute 0.01 0.00 - 0.10 x10*3/uL   Comprehensive metabolic panel   Result Value Ref Range    Glucose 112 (H) 74 - 99 mg/dL    Sodium 142 136 - 145 mmol/L    Potassium 4.4 3.5 - 5.3 mmol/L    Chloride 105 98 - 107 mmol/L    Bicarbonate 31 21 - 32 mmol/L    Anion Gap 10 10 - 20 mmol/L    Urea Nitrogen 19 6 - 23 mg/dL    Creatinine 1.03 0.50 - 1.30 mg/dL    eGFR 73 >60 mL/min/1.73m*2    Calcium 9.1 8.6 - 10.3 mg/dL    Albumin 4.2 3.4 - 5.0 g/dL    Alkaline Phosphatase 105 33 - 136 U/L     Total Protein 6.9 6.4 - 8.2 g/dL    AST 17 9 - 39 U/L    Bilirubin, Total 0.6 0.0 - 1.2 mg/dL    ALT 15 10 - 52 U/L   Lipase   Result Value Ref Range    Lipase 31 9 - 82 U/L   Troponin I, High Sensitivity   Result Value Ref Range    Troponin I, High Sensitivity 8 0 - 20 ng/L   Blood Gas Venous Full Panel   Result Value Ref Range    POCT pH, Venous 7.32 (L) 7.33 - 7.43 pH    POCT pCO2, Venous 59 (H) 41 - 51 mm Hg    POCT pO2, Venous 28 (L) 35 - 45 mm Hg    POCT SO2, Venous 37 (L) 45 - 75 %    POCT Oxy Hemoglobin, Venous 36.9 (L) 45.0 - 75.0 %    POCT Hematocrit Calculated, Venous 44.0 41.0 - 52.0 %    POCT Sodium, Venous 141 136 - 145 mmol/L    POCT Potassium, Venous 3.9 3.5 - 5.3 mmol/L    POCT Chloride, Venous 105 98 - 107 mmol/L    POCT Ionized Calicum, Venous 1.16 1.10 - 1.33 mmol/L    POCT Glucose, Venous 104 (H) 74 - 99 mg/dL    POCT Lactate, Venous 3.1 (H) 0.4 - 2.0 mmol/L    POCT Base Excess, Venous 2.6 -2.0 - 3.0 mmol/L    POCT HCO3 Calculated, Venous 30.4 (H) 22.0 - 26.0 mmol/L    POCT Hemoglobin, Venous 14.6 13.5 - 17.5 g/dL    POCT Anion Gap, Venous 10.0 10.0 - 25.0 mmol/L    Patient Temperature 37.0 degrees Celsius    FiO2 100 %   Lactate   Result Value Ref Range    Lactate 1.9 0.4 - 2.0 mmol/L   Creatine Kinase   Result Value Ref Range    Creatine Kinase 25 0 - 325 U/L   SARS-CoV-2 RT PCR   Result Value Ref Range    Coronavirus 2019, PCR Not Detected Not Detected   Influenza A, and B PCR   Result Value Ref Range    Flu A Result Not Detected Not Detected    Flu B Result Not Detected Not Detected   Blood Gas Lactic Acid, Venous   Result Value Ref Range    POCT Lactate, Venous 2.1 (H) 0.4 - 2.0 mmol/L   Blood Gas Venous   Result Value Ref Range    POCT pH, Venous 7.40 7.33 - 7.43 pH    POCT pCO2, Venous 47 41 - 51 mm Hg    POCT pO2, Venous 55 (H) 35 - 45 mm Hg    POCT SO2, Venous 84 (H) 45 - 75 %    POCT Oxy Hemoglobin, Venous 82.5 (H) 45.0 - 75.0 %    POCT Base Excess, Venous 3.5 (H) -2.0 - 3.0 mmol/L     POCT HCO3 Calculated, Venous 29.1 (H) 22.0 - 26.0 mmol/L    Patient Temperature 37.0 degrees Celsius    FiO2 21 %    Test Comment [facility]       CT head wo IV contrast    Result Date: 11/12/2023  Interpreted By:  Sabrina Ramirez, STUDY: CT HEAD WO IV CONTRAST;  11/12/2023 6:01 pm   INDICATION: Signs/Symptoms:confusion.   COMPARISON: 10/16/2023   ACCESSION NUMBER(S): [id number]   ORDERING CLINICIAN: CASSIDY MEREDITH   TECHNIQUE: Axial noncontrast CT images of the head.   FINDINGS: BRAIN PARENCHYMA: Extensive parenchymal volume loss, nonspecific white matter changes and calcifications of the carotid arteries. No mass effect or midline shift. Focus of encephalomalacia in the right cerebellar hemisphere and extending from the left middle cerebellar peduncle into the left cerebellar hemisphere, similar to prior imaging. Punctate calcifications within the right sulci similar to prior imaging.   HEMORRHAGE: No acute intracranial hemorrhage. VENTRICLES and EXTRA-AXIAL SPACES: Margin of the lateral and 3rd ventricles are again noted which may be seen with central white matter loss or normal pressure hydrocephalus.. Ex vacuo dilatation of the 4th ventricle likely related to prior infarct. EXTRACRANIAL SOFT TISSUES: Within normal limits. PARANASAL SINUSES/MASTOIDS: The visualized paranasal sinuses and mastoid air cells are aerated. CALVARIUM: No depressed skull fracture. No destructive osseous lesion.   OTHER FINDINGS: None.       No acute intracranial hemorrhage or mass effect.   Chronic white matter changes and sequela of remote posterior fossa infarcts again noted.   Ventricular prominence again noted.   MACRO: None.   Signed by: Sabrina Ramirez 11/12/2023 6:33 PM Dictation workstation:   [id number]    XR chest 1 view    Result Date: 11/12/2023  Interpreted By:  Bhavesh Barros, STUDY: XR CHEST 1 VIEW;  11/12/2023 4:33 pm   INDICATION: Signs/Symptoms:cough, sob.   COMPARISON: 11/07/2023.   ACCESSION NUMBER(S):  [id number]   ORDERING CLINICIAN: CASSIDY MEREDITH   FINDINGS: No consolidation. No pleural effusion or pneumothorax. Hyperinflated lungs suggestive of COPD. Right-sided pleural calcifications unchanged. Left pleural calcification also present. Asymmetric elevation of the left hemidiaphragm. Atherosclerosis. Normal heart size. No acute osseous abnormality.       No acute cardiopulmonary abnormality.   Signed by: Bhavesh Barros 11/12/2023 4:45 PM Dictation workstation:   [id number]    CT angio chest for pulmonary embolism    Result Date: 11/7/2023  Interpreted By:  Ramana Ortega, STUDY: CT ANGIO CHEST FOR PULMONARY EMBOLISM; 11/7/2023 1:10 pm   INDICATION: Signs/Symptoms:SOB.   COMPARISON: CT chest dated 10/16/2023.   ACCESSION NUMBER(S): [id number]   ORDERING CLINICIAN: TRISHA LANDON   TECHNIQUE: Contiguous axial CT images were obtained through the chest at 2 mm slice thickness following administration of intravenous contrast utilizing pulmonary artery bolus tracking. 50 cc of Omnipaque 350 was administered. The images were then reconstructed in the coronal and sagittal planes. MIP images were created and reviewed.   FINDINGS: POTENTIAL LIMITATIONS OF THE STUDY: None   HEART and VESSELS: There is dense opacification of the pulmonary arterial system. No intraluminal filling defect is seen within the pulmonary arteries to suggest acute pulmonary embolus.   The heart is within normal limits for size. No pericardial effusion is identified. Dense atherosclerotic calcifications are seen in the coronary arteries. Mild aortic valve calcifications are present.   Moderate atherosclerotic calcifications are seen in the aortic arch and descending thoracic aorta , including at the origins of the arch vessels. The thoracic aorta is within normal limits for course and caliber, without evidence of aneurysm.   MEDIASTINUM and SHANTA, LOWER NECK AND AXILLA: Evaluation of the visualized neck base demonstrates no gross mass or  lymphadenopathy.   There is no gross axillary, hilar or mediastinal lymphadenopathy identified.   LUNGS and AIRWAYS: The trachea and central airways are grossly patent without evidence of endobronchial lesion.   Mild-to-moderate emphysematous changes are seen in the lungs bilaterally. Focal airspace consolidation is seen in the posteromedial aspect of the left mid lung, similar to the prior study, and may represent scarring/chronic atelectasis. Dense pleural calcifications are seen in the right lung. No discrete pulmonary nodules or masses are seen, though evaluation is limited within the regions of airspace consolidation..   UPPER ABDOMEN: Evaluation of the visualized upper abdomen demonstrates no discrete mass or lymphadenopathy.   CHEST WALL and OSSEOUS STRUCTURES: There is no evidence of acute fracture identified. Multilevel degenerative changes are seen in the thoracic spine.       1. No evidence of acute pulmonary embolus. 2. Left lower lobe airspace consolidation, most consistent with scarring/chronic atelectasis. 3. Emphysematous changes in the lungs bilaterally. 4. Extensive pleural calcifications in the right chest.   MACRO: None.     Signed by: Ramana Ortega 11/7/2023 1:30 PM Dictation workstation:   [id number]    XR chest 2 views    Result Date: 11/7/2023  Interpreted By:  Aretha Skelton, STUDY: XR CHEST 2 VIEWS;  11/7/2023 1:11 pm   INDICATION: Signs/Symptoms:cough.   COMPARISON: 10/16/2023   ACCESSION NUMBER(S): [id number]   ORDERING CLINICIAN: TRISHA LANDON   FINDINGS: Prominent calcified pleural plaques are seen. Emphysematous changes are noted. Elevation of the left hemidiaphragm is again seen, which is unchanged. The heart is not enlarged. No consolidation, pleural effusion pneumothorax is noted.       Chronic lung changes.     MACRO: None   Signed by: Aretha Skelton 11/7/2023 1:16 PM Dictation workstation:   [id number]    CT head wo IV contrast    Result Date: 10/16/2023  Interpreted By:   Topher Hendricks, STUDY: CT HEAD WO IV CONTRAST;  10/16/2023 8:05 pm   INDICATION: Signs/Symptoms:new confusion.   COMPARISON: CT head 06/07/2023   ACCESSION NUMBER(S): [id number]   ORDERING CLINICIAN: FRANKLIN WELCH   TECHNIQUE: Noncontrast axial CT images of head were obtained with coronal and sagittal reconstructed images.   FINDINGS: BRAIN PARENCHYMA: Mild generalized cerebral volume loss. No evidence of acute large territory infarction or transcortical edema. The deep gray structures are preserved. No mass-effect, midline shift or effacement of cerebral sulci. Confluent periventricular and subcortical white matter hypodensities, nonspecific but often seen in the setting of chronic microangiopathic disease.   HEMORRHAGE: No acute intracranial hemorrhage.   VENTRICLES and EXTRA-AXIAL SPACES: Prominence of the lateral and 3rd ventricles greater than expected for degree of cerebral volume loss. No abnormal extra-axial fluid collection.   ORBITS: The visualized orbits and globes are within normal limits.   EXTRACRANIAL SOFT TISSUES: Within normal limits.   PARANASAL SINUSES/MASTOIDS: The visualized paranasal sinuses and mastoid air cells are well aerated.   CALVARIUM: No depressed skull fracture.   Brain Injury Guidelines (BIG) CT Values:   Skull fracture: No SDH (subdural hematoma): No EDH (epidural hematoma): No IPH (intraparenchymal hemorrhage): No SAH (subarachnoid hemorrhage): No IVH (intraventricular hemorrhage): No   Reference: Carl JENKINS, Judy RS, Everton KNAPP, et al. The BIG (brain injury guidelines) project: defining the management of traumatic brain injury by acute care surgeons. J Trauma Acute Care Surg 2014; 76:965-969.       1. No acute intracranial abnormality identified. 2. Ventricular prominence greater than expected for degree of cerebral volume loss, consider normal pressure hydrocephalus or other hydrocephalus. 3. Chronic white matter changes likely related to small-vessel ischemic disease.        MACRO: None   Signed by: Topher Hendricks 10/16/2023 8:45 PM Dictation workstation:   [id number]    CT angio chest for pulmonary embolism    Result Date: 10/16/2023  Interpreted By:  Topher Hendricks, STUDY: CT ANGIO CHEST FOR PULMONARY EMBOLISM;  10/16/2023 8:05 pm   INDICATION: Signs/Symptoms:SOB, elevated dimer.   COMPARISON: CT chest 03/09/2023   ACCESSION NUMBER(S): [id number]   ORDERING CLINICIAN: TOPHER WANG   TECHNIQUE: Contiguous axial images of the chest were obtained after the intravenous administration of 75 mL Omnipaque 350 contrast using angiographic PE protocol. Coronal and sagittal reformatted images were reconstructed from the axial data. MIP images were created on an independent workstation and reviewed.   FINDINGS: PULMONARY ARTERIES: Adequate opacification to the level of the segmental arteries. The subsegmental arteries are suboptimally assessed due to mixing artifact and respiratory motion. No filling defect to suggest pulmonary embolus in the visualized pulmonary arteries. The main pulmonary artery is normal in diameter. No CT evidence of right heart strain.   HEART: Normal in size. Moderate to heavy triple-vessel coronary vascular calcifications. Calcifications in the plane of the mitral valve. No significant pericardial effusion.   VESSELS: Normal caliber aorta without dissection. Heavy calcifications of the aortic arch and proximal great vessels as well as the descending thoracic aorta.   MEDIASTINUM AND LYMPH NODES: Visualized thyroid is within normal limits. No enlarged intrathoracic or axillary lymph nodes by imaging criteria. No pneumomediastinum. The esophagus appears within normal limits.   LUNG, AIRWAYS, AND PLEURA: Dependent debris within the distal trachea. Left lower lobe bronchial wall thickening with adjacent patchy and consolidative opacities. There is a background of diffuse centrilobular and paraseptal emphysematous change. Bibasilar scarring. Bandlike scarring in  the right apex. No pleural effusion or pneumothorax. Scattered bilateral pleural calcifications most pronounced along the pleural surfaces of the right lower lobe. Findings suggest prior asbestos exposure.   OSSEOUS STRUCTURES: No acute osseous abnormality.   CHEST WALL SOFT TISSUES: No discernible abnormality.   UPPER ABDOMEN/OTHER: Heavy vascular calcifications in the upper abdomen.       1. No evidence of pulmonary embolus to the level of the segmental arteries. Filling defects within the subsegmental arteries can not be entirely excluded. 2. Bronchial wall thickening in the medial left lower lobe with adjacent patchy and consolidative opacities. Findings are most suggestive of pneumonia or aspiration pneumonitis. Follow-up is recommended to ensure resolution and exclude underlying lesion. 3. Diffuse background of emphysematous changes and pleural calcifications suggestive of prior asbestos exposure. 4. Please see additional findings and discussion as above.   MACRO: None   Signed by: Topher Hendricks 10/16/2023 8:36 PM Dictation workstation:   [id number]    XR chest 1 view    Result Date: 10/16/2023  Interpreted By:  Paco Andrews, STUDY: XR CHEST 1 VIEW   INDICATION: Signs/Symptoms:hypoxia, confusion.   COMPARISON: June 7, 2023   ACCESSION NUMBER(S): [id number]   ORDERING CLINICIAN: FRANKLIN WELCH   FINDINGS: Extensive pleural-based calcifications in the bilateral lungs right worse than left, similar to prior study.   No new consolidation or edema. No evidence of pneumothorax or effusion.       Extensive calcified pleural plaques suggesting asbestos exposure similar to prior study. No evidence of acute intrathoracic abnormality.   Signed by: Paco Andrews 10/16/2023 5:27 PM Dictation workstation:   [id number]     Scheduled medications:  doxycycline (Vibramycin) 100 mg in sodium chloride 0.9 % 100 mL IV, 100 mg, intravenous, q12h  ipratropium-albuteroL, 3 mL, nebulization, q6h  methylPREDNISolone sodium succinate  "(PF), 40 mg, intravenous, q8h CAITY      Continuous medications:     PRN medications:  PRN medications: acetaminophen      Past Medical History  He has a past medical history of Personal history of other infectious and parasitic diseases (11/26/2019), Personal history of other specified conditions (09/27/2019), and Personal history of other specified conditions (09/27/2019).    Surgical History  He has a past surgical history that includes Other surgical history (04/27/2018) and CT angio neck w and wo IV contrast (4/14/2023).     Social History  He reports that he has quit smoking. His smoking use included cigarettes. He has never used smokeless tobacco. He reports that he does not drink alcohol and does not use drugs.    Family History  Family History   Problem Relation Name Age of Onset    No Known Problems Mother      No Known Problems Father          Allergies  Patient has no known allergies.    Code Status  Prior     Review of Systems   Reason unable to perform ROS: Slightly limited due to confusion.   Constitutional:  Negative for chills and fever.   Eyes:  Negative for photophobia and visual disturbance.   Respiratory:  Positive for cough and shortness of breath.    Cardiovascular:  Positive for leg swelling (\"They're always like that, I don't know\"). Negative for chest pain and palpitations.   Gastrointestinal:  Negative for abdominal pain, blood in stool, nausea and vomiting.   Endocrine: Negative for polydipsia and polyuria.   Genitourinary:  Negative for decreased urine volume, dysuria, hematuria and urgency.   Skin:  Negative for color change and rash.   Neurological:  Negative for dizziness, tremors, syncope, weakness and light-headedness.   Psychiatric/Behavioral:  Positive for confusion. Negative for sleep disturbance.    All other systems reviewed and are negative.    Last Recorded Vitals  /64   Pulse 65   Temp 36.8 °C (98.2 °F)   Resp 22   Wt 56.1 kg (123 lb 10.9 oz)   SpO2 100%    "   Physical Exam  Vitals and nursing note reviewed. Exam conducted with a chaperone present.   Constitutional:       General: He is awake. He is not in acute distress.     Appearance: Normal appearance. He is well-developed. He is not ill-appearing or toxic-appearing.   HENT:      Head: Normocephalic and atraumatic.      Right Ear: External ear normal.      Left Ear: External ear normal.      Nose: Nose normal.      Mouth/Throat:      Mouth: Mucous membranes are moist.   Eyes:      Extraocular Movements: Extraocular movements intact.      Pupils: Pupils are equal, round, and reactive to light.   Cardiovascular:      Rate and Rhythm: Normal rate and regular rhythm.      Pulses: Normal pulses.      Heart sounds: No murmur heard.     No friction rub. No gallop.   Pulmonary:      Effort: Pulmonary effort is normal. No respiratory distress.      Breath sounds: Wheezing and rhonchi present. No rales.      Comments: On 2L NC  Intermittent expiratory wheezing   A few scattered rhonchi cleared with coughing   Abdominal:      General: Bowel sounds are normal. There is no distension.      Palpations: Abdomen is soft. There is no hepatomegaly, splenomegaly or mass.      Tenderness: There is no abdominal tenderness.   Musculoskeletal:         General: No deformity or signs of injury. Normal range of motion.      Cervical back: Normal range of motion and neck supple. No rigidity or tenderness.      Right lower leg: Edema (mild) present.      Left lower leg: Edema (mild) present.   Skin:     General: Skin is warm and dry.      Capillary Refill: Capillary refill takes less than 2 seconds.      Coloration: Skin is not pale.      Findings: No erythema, lesion or rash.   Neurological:      General: No focal deficit present.      Mental Status: He is alert.      Cranial Nerves: No cranial nerve deficit.      Sensory: No sensory deficit.      Comments: As mentioned in HPI, patient is oriented to self, location as a hospital in [facility]  and can provide the president as Reanna.  He reports that the month is February and the year is either 2002 or 2003; when asked further clarification questions he stated that he did not know and angrily asked why we are asking in the first place.   Psychiatric:         Mood and Affect: Mood normal.         Behavior: Behavior is cooperative.      Comments: Intermittently angry       Assessment/Plan   Principal Problem:    Altered mental status, unspecified altered mental status type  Active Problems:    Hyperlipidemia    Acute hypoxic respiratory failure (CMS/HCC)    Cerebral ventriculomegaly    Acute exacerbation of chronic obstructive pulmonary disease (COPD) (CMS/HCC)    Acute metabolic encephalopathy    78 y.o. male with PMHx s/f HTN, HLD, COPD (no baseline O2), prior stroke, prostate cancer, and recent admission to [place] for AMS/PNA/hypoxia, presenting with change in mental status and shortness of breath. Admitted to medicine for further management.    1.)  Altered mental status, suspect acute metabolic encephalopathy in setting of hypoxic respiratory failure and COPD exacerbation  -CT head with ventricular prominence as noted during last hospitalization.  Neurology recommended management of any acute issues and to continue dual antiplatelet and statin, have follow-up with neurosurgery in the outpatient setting in the future.  -We will continue management as outlined below, UA is also pending  -Check TSH in a.m. if not recently completed  -No obvious focal or lateralizing deficits, appears patient is back to what appears to be his most recent baseline from his last hospitalization    2.) Acute hypoxic respiratory failure 2/2 acute exacerbation of COPD   -Pt with wheezing on exam and overall diminished breath sounds  -Imaging and labs otherwise unrevealing   -Continued on home therapies, DuoNebs  -Continued on IV steroids  -Empiric doxycycline   -RT c/s appreciated   -Pulm hygiene     3.) HTN, HLD   -Confirm and  resume home therapies  -BP slightly elevated during presentation, suspect stress related. Continue to follow.     4.) History of prior stroke   -Continue DAPT, continue statin   -Continue follow-up as mentioned above     5.) History of prostate cancer   -Follow-up as directed          Annamaria Renee PA-C    Dragon dictation software was used to dictate this note and thus there may be minor errors in translation/transcription including garbled speech or misspellings. Please contact for clarification if needed.

## 2023-11-13 NOTE — ASSESSMENT & PLAN NOTE
-Confirm and resume home therapies  -BP slightly elevated during presentation, suspect stress related. Continue to follow.

## 2023-11-13 NOTE — PROGRESS NOTES
"  Occupational Therapy    Evaluation    Patient Name: Hai Laboy  MRN: [id number]  Today's Date: 11/13/2023  Time Calculation  Start Time: 1112  Stop Time: 1132  Time Calculation (min): 20 min    Assessment  IP OT Assessment  OT Assessment: OT eval completed. the pt is functionig below baseline for ADLs and transfers. can benefit from continued OT  End of Session Communication: Bedside nurse  End of Session Patient Position: Up in chair, Alarm on    Plan:  Treatment Interventions: ADL retraining, Functional transfer training, UE strengthening/ROM, Endurance training, Patient/family training, Cognitive reorientation, Equipment evaluation/education  OT Frequency: 3 times per week  OT Discharge Recommendations: Moderate intensity level of continued care    Subjective     Current Problem:  1. Altered mental status, unspecified altered mental status type        2. COPD exacerbation (CMS/HCC)            General:  General  Reason for Referral: ADLs, safety assessment  Referred By: Víctor  Past Medical History Relevant to Rehab: pt admit for AMS and worsening SOB. inability to get out of chair. pmh: HTN, UTI, COPD  Co-Treatment: PT  Co-Treatment Reason: to maximize safety, while focused on discipline specific goals  Prior to Session Communication: Bedside nurse  Patient Position Received: Bed, 3 rail up, Alarm on  General Comment: the pt was easily overwhelmed. c/o not understanding white board and expressing he had difficulty comprehending written communications/reading. also perseverating on \"getting a hold of the [place] [employer]\" to see if they can help him into his house, since they assisted him out of his house and brought him here.    Precautions:  Medical Precautions: Fall precautions, Oxygen therapy device and L/min    Vital Signs:       Pain:  Pain Assessment  Pain Assessment: 0-10  Pain Score:  (not rated)  Pain Type: Acute pain  Pain Location: Wrist (where IV site located)  Pain Interventions: Emotional " support, Repositioned    Objective     Cognition:  Overall Cognitive Status: Impaired  Arousal/Alertness: Appropriate responses to stimuli  Orientation Level:  (Oriented to self only. not aware of where he is, but is aware he is not at home/ not able to name facility. not oriented to time. not oriented to situation. stated it was FEB 2003)  Following Commands: Follows one step commands with repetition  Safety Judgment: Decreased awareness of need for assistance  Problem Solving: Assistance required to identify errors made  Problem Solving: Exceptions to WFL  Complex Functional Tasks: Impaired  Safety/Judgement: Exceptions to WFL  Complex Functional Tasks: Moderate  Novel Situations: Maximal  Routine Tasks: Minimal  Unable to Self-Monitor and Self-Correct Consistently: Moderate  Insight: Moderate  Impulsive: Moderately  Task Initiation: Delayed initiation  Flexibility of Thought: Reduced flexibility  Planning: Reduced planning skills  Organization: Moderately disorganized  Processing Speed: Delayed             Home Living:  Type of Home: House  Lives With: Alone  Home Adaptive Equipment: Walker rolling or standard  Home Layout: Two level  Home Access: Stairs to enter with rails  Entrance Stairs-Number of Steps: 3-4     Prior Function:  Receives Help From: Neighbor, Friends  ADL Assistance: Independent  Homemaking Assistance: Needs assistance  Ambulatory Assistance: Independent    IADL History:  Mode of Transportation: Friends    ADL:  LE Dressing Assistance: Total  LE Dressing Deficit: Don/doff R sock, Don/doff L sock    Bed Mobility/Transfers:   Bed Mobility  Bed Mobility: Yes  Bed Mobility 1  Bed Mobility 1: Supine to sitting  Level of Assistance 1: Minimum assistance (x2)  Transfers  Transfer: Yes  Transfer 1  Transfer From 1: Sit to  Transfer to 1: Stand  Transfer Device 1: Walker  Transfer Level of Assistance 1: Minimum assistance, +2  Transfers 2  Transfer From 2: Bed to  Transfer to 2: Chair with  arms  Transfer Device 2: Walker  Transfer Level of Assistance 2: Minimum assistance, +2  Trials/Comments 2: performed functional mobility ~4ft. max VC for sequencing and safety        Standing Balance:  Static Standing Balance  Static Standing-Balance Support: Bilateral upper extremity supported  Static Standing-Level of Assistance: Minimum assistance  Dynamic Standing Balance  Dynamic Standing-Balance Support: Bilateral upper extremity supported  Dynamic Standing-Comments: min A        Strength:  Strength Comments: BUE ROM WFL. BUE strength grossly 4-/5      Outcome Measures: [facility] Daily Activity  Putting on and taking off regular lower body clothing: Total  Bathing (including washing, rinsing, drying): A lot  Putting on and taking off regular upper body clothing: A little  Toileting, which includes using toilet, bedpan or urinal: A lot  Taking care of personal grooming such as brushing teeth: A little  Eating Meals: A little  Daily Activity - Total Score: 14                    EDUCATION:     Education Documentation  ADL Training, taught by Jeri Hanson OT at 11/13/2023  2:23 PM.  Learner: Patient  Readiness: Acceptance  Method: Demonstration  Response: Needs Reinforcement    Education Comments  No comments found.        Goals:   Encounter Problems       Encounter Problems (Active)       ADLs       Patient will complete daily grooming tasks brushing teeth and washing face/hair with stand by assist level of assistance and PRN adaptive equipment while standing. (Progressing)       Start:  11/13/23    Expected End:  11/27/23            Patient will complete toileting including hygiene clothing management/hygiene with stand by assist level of assistance and raised toilet seat and grab bars. (Progressing)       Start:  11/13/23    Expected End:  11/27/23               MOBILITY       Patient will perform Functional mobility max Household distances/Community Distances with modified independent level of assistance and  least restrictive device in order to improve safety and functional mobility. (Progressing)       Start:  11/13/23    Expected End:  11/27/23

## 2023-11-13 NOTE — PROGRESS NOTES
"   11/13/23 0916   Discharge Planning   Living Arrangements Alone   Support Systems Friends/neighbors   Type of Residence Private residence   Patient expects to be discharged to: home     Pt states he wants to be discharged.  He is uncertain where he is although admits that he was 'taken to [facility]\".   He is currently on O2 but does not wear at home.  He admits that his neighbor, Angel (in contact list) helps him and was \"with me when the ambulance came\".   PT/OT rick pending.    "

## 2023-11-13 NOTE — ASSESSMENT & PLAN NOTE
-CT head with ventricular prominence as noted during last hospitalization.  Neurology recommended management of any acute issues and to continue dual antiplatelet and statin, have follow-up with neurosurgery in the outpatient setting in the future.

## 2023-11-13 NOTE — CARE PLAN
Problem: Fall/Injury  Goal: Not fall by end of shift  Outcome: Progressing  Goal: Be free from injury by end of the shift  Outcome: Progressing  Goal: Verbalize understanding of personal risk factors for fall in the hospital  Outcome: Progressing  Goal: Verbalize understanding of risk factor reduction measures to prevent injury from fall in the home  Outcome: Progressing  Goal: Use assistive devices by end of the shift  Outcome: Progressing  Goal: Pace activities to prevent fatigue by end of the shift  Outcome: Progressing     Problem: Respiratory  Goal: Clear secretions with interventions this shift  Outcome: Progressing  Goal: Minimal/no exertional discomfort or dyspnea this shift  Outcome: Progressing  Goal: Patent airway maintained this shift  Outcome: Progressing  Goal: Wean oxygen to maintain O2 saturation per order/standard this shift  Outcome: Progressing     Problem: Pain - Adult  Goal: Verbalizes/displays adequate comfort level or baseline comfort level  Outcome: Progressing     Problem: Safety - Adult  Goal: Free from fall injury  Outcome: Progressing     Problem: Discharge Planning  Goal: Discharge to home or other facility with appropriate resources  Outcome: Progressing     Problem: Chronic Conditions and Co-morbidities  Goal: Patient's chronic conditions and co-morbidity symptoms are monitored and maintained or improved  Outcome: Progressing     Problem: Skin  Goal: Decreased wound size/increased tissue granulation at next dressing change  Outcome: Progressing  Flowsheets (Taken 11/13/2023 1655)  Decreased wound size/increased tissue granulation at next dressing change: Protective dressings over bony prominences  Goal: Participates in plan/prevention/treatment measures  Outcome: Progressing  Flowsheets (Taken 11/13/2023 1655)  Participates in plan/prevention/treatment measures:   Elevate heels   Increase activity/out of bed for meals  Goal: Prevent/manage excess moisture  Outcome:  Progressing  Flowsheets (Taken 11/13/2023 1655)  Prevent/manage excess moisture:   Follow provider orders for dressing changes   Moisturize dry skin  Goal: Prevent/minimize sheer/friction injuries  Outcome: Progressing  Flowsheets (Taken 11/13/2023 1655)  Prevent/minimize sheer/friction injuries:   Increase activity/out of bed for meals   HOB 30 degrees or less   Turn/reposition every 2 hours/use positioning/transfer devices  Goal: Promote/optimize nutrition  Outcome: Progressing  Flowsheets (Taken 11/13/2023 1655)  Promote/optimize nutrition:   Assist with feeding   Consume > 50% meals/supplements  Goal: Promote skin healing  Outcome: Progressing  Flowsheets (Taken 11/13/2023 1655)  Promote skin healing:   Assess skin/pad under line(s)/device(s)   Protective dressings over bony prominences     Problem: Diabetes  Goal: Achieve decreasing blood glucose levels by end of shift  Outcome: Progressing  Goal: Increase stability of blood glucose readings by end of shift  Outcome: Progressing  Goal: Decrease in ketones present in urine by end of shift  Outcome: Progressing  Goal: Maintain electrolyte levels within acceptable range throughout shift  Outcome: Progressing  Goal: Maintain glucose levels >70mg/dl to <250mg/dl throughout shift  Outcome: Progressing  Goal: No changes in neurological exam by end of shift  Outcome: Progressing  Goal: Learn about and adhere to nutrition recommendations by end of shift  Outcome: Progressing  Goal: Vital signs within normal range for age by end of shift  Outcome: Progressing  Goal: Increase self care and/or family involovement by end of shift  Outcome: Progressing  Goal: Receive DSME education by end of shift  Outcome: Progressing     The clinical goals for the shift include Patient will remain free from shortness of breath during shift    Over the shift, the patient is progressing toward the following goals.

## 2023-11-14 LAB
ANION GAP SERPL CALC-SCNC: 10 MMOL/L (ref 10–20)
BUN SERPL-MCNC: 22 MG/DL (ref 6–23)
CALCIUM SERPL-MCNC: 8.9 MG/DL (ref 8.6–10.3)
CHLORIDE SERPL-SCNC: 108 MMOL/L (ref 98–107)
CO2 SERPL-SCNC: 27 MMOL/L (ref 21–32)
CREAT SERPL-MCNC: 0.91 MG/DL (ref 0.5–1.3)
ERYTHROCYTE [DISTWIDTH] IN BLOOD BY AUTOMATED COUNT: 14.4 % (ref 11.5–14.5)
GFR SERPL CREATININE-BSD FRML MDRD: 85 ML/MIN/1.73M*2
GLUCOSE BLD MANUAL STRIP-MCNC: 114 MG/DL (ref 74–99)
GLUCOSE BLD MANUAL STRIP-MCNC: 140 MG/DL (ref 74–99)
GLUCOSE BLD MANUAL STRIP-MCNC: 168 MG/DL (ref 74–99)
GLUCOSE BLD MANUAL STRIP-MCNC: 201 MG/DL (ref 74–99)
GLUCOSE SERPL-MCNC: 141 MG/DL (ref 74–99)
HCT VFR BLD AUTO: 35.9 % (ref 41–52)
HGB BLD-MCNC: 12.1 G/DL (ref 13.5–17.5)
MAGNESIUM SERPL-MCNC: 2.03 MG/DL (ref 1.6–2.4)
MCH RBC QN AUTO: 30.9 PG (ref 26–34)
MCHC RBC AUTO-ENTMCNC: 33.7 G/DL (ref 32–36)
MCV RBC AUTO: 92 FL (ref 80–100)
NRBC BLD-RTO: 0 /100 WBCS (ref 0–0)
PLATELET # BLD AUTO: 182 X10*3/UL (ref 150–450)
POTASSIUM SERPL-SCNC: 5.1 MMOL/L (ref 3.5–5.3)
RBC # BLD AUTO: 3.92 X10*6/UL (ref 4.5–5.9)
SODIUM SERPL-SCNC: 140 MMOL/L (ref 136–145)
WBC # BLD AUTO: 12.4 X10*3/UL (ref 4.4–11.3)

## 2023-11-14 PROCEDURE — 2500000002 HC RX 250 W HCPCS SELF ADMINISTERED DRUGS (ALT 637 FOR MEDICARE OP, ALT 636 FOR OP/ED): Performed by: STUDENT IN AN ORGANIZED HEALTH CARE EDUCATION/TRAINING PROGRAM

## 2023-11-14 PROCEDURE — 2500000004 HC RX 250 GENERAL PHARMACY W/ HCPCS (ALT 636 FOR OP/ED): Performed by: STUDENT IN AN ORGANIZED HEALTH CARE EDUCATION/TRAINING PROGRAM

## 2023-11-14 PROCEDURE — 97530 THERAPEUTIC ACTIVITIES: CPT | Mod: GO,CO

## 2023-11-14 PROCEDURE — 80048 BASIC METABOLIC PNL TOTAL CA: CPT | Performed by: PHYSICIAN ASSISTANT

## 2023-11-14 PROCEDURE — 2500000001 HC RX 250 WO HCPCS SELF ADMINISTERED DRUGS (ALT 637 FOR MEDICARE OP): Performed by: STUDENT IN AN ORGANIZED HEALTH CARE EDUCATION/TRAINING PROGRAM

## 2023-11-14 PROCEDURE — 2500000004 HC RX 250 GENERAL PHARMACY W/ HCPCS (ALT 636 FOR OP/ED): Performed by: PHYSICIAN ASSISTANT

## 2023-11-14 PROCEDURE — 1200000002 HC GENERAL ROOM WITH TELEMETRY DAILY

## 2023-11-14 PROCEDURE — 83735 ASSAY OF MAGNESIUM: CPT | Performed by: PHYSICIAN ASSISTANT

## 2023-11-14 PROCEDURE — 99233 SBSQ HOSP IP/OBS HIGH 50: CPT | Performed by: PHYSICIAN ASSISTANT

## 2023-11-14 PROCEDURE — 36415 COLL VENOUS BLD VENIPUNCTURE: CPT | Performed by: PHYSICIAN ASSISTANT

## 2023-11-14 PROCEDURE — 96372 THER/PROPH/DIAG INJ SC/IM: CPT | Performed by: STUDENT IN AN ORGANIZED HEALTH CARE EDUCATION/TRAINING PROGRAM

## 2023-11-14 PROCEDURE — 82947 ASSAY GLUCOSE BLOOD QUANT: CPT

## 2023-11-14 PROCEDURE — 85027 COMPLETE CBC AUTOMATED: CPT | Performed by: PHYSICIAN ASSISTANT

## 2023-11-14 PROCEDURE — 94640 AIRWAY INHALATION TREATMENT: CPT

## 2023-11-14 RX ADMIN — METHYLPREDNISOLONE SODIUM SUCCINATE 40 MG: 40 INJECTION, POWDER, LYOPHILIZED, FOR SOLUTION INTRAMUSCULAR; INTRAVENOUS at 05:06

## 2023-11-14 RX ADMIN — POLYETHYLENE GLYCOL 3350 17 G: 17 POWDER, FOR SOLUTION ORAL at 12:22

## 2023-11-14 RX ADMIN — GUAIFENESIN 1200 MG: 600 TABLET ORAL at 12:22

## 2023-11-14 RX ADMIN — TIOTROPIUM BROMIDE INHALATION SPRAY 2 PUFF: 3.12 SPRAY, METERED RESPIRATORY (INHALATION) at 06:31

## 2023-11-14 RX ADMIN — IPRATROPIUM BROMIDE AND ALBUTEROL SULFATE 3 ML: 2.5; .5 SOLUTION RESPIRATORY (INHALATION) at 11:09

## 2023-11-14 RX ADMIN — HEPARIN SODIUM 5000 UNITS: 5000 INJECTION INTRAVENOUS; SUBCUTANEOUS at 13:36

## 2023-11-14 RX ADMIN — PANTOPRAZOLE SODIUM 40 MG: 40 TABLET, DELAYED RELEASE ORAL at 06:31

## 2023-11-14 RX ADMIN — METHYLPREDNISOLONE SODIUM SUCCINATE 40 MG: 40 INJECTION, POWDER, LYOPHILIZED, FOR SOLUTION INTRAMUSCULAR; INTRAVENOUS at 21:31

## 2023-11-14 RX ADMIN — LISINOPRIL 40 MG: 20 TABLET ORAL at 12:22

## 2023-11-14 RX ADMIN — DOXYCYCLINE 100 MG: 100 INJECTION, POWDER, LYOPHILIZED, FOR SOLUTION INTRAVENOUS at 12:22

## 2023-11-14 RX ADMIN — HEPARIN SODIUM 5000 UNITS: 5000 INJECTION INTRAVENOUS; SUBCUTANEOUS at 21:31

## 2023-11-14 RX ADMIN — METHYLPREDNISOLONE SODIUM SUCCINATE 40 MG: 40 INJECTION, POWDER, LYOPHILIZED, FOR SOLUTION INTRAMUSCULAR; INTRAVENOUS at 13:36

## 2023-11-14 RX ADMIN — ATORVASTATIN CALCIUM 10 MG: 10 TABLET, FILM COATED ORAL at 21:31

## 2023-11-14 RX ADMIN — IPRATROPIUM BROMIDE AND ALBUTEROL SULFATE 3 ML: 2.5; .5 SOLUTION RESPIRATORY (INHALATION) at 07:25

## 2023-11-14 RX ADMIN — FLUTICASONE FUROATE AND VILANTEROL TRIFENATATE 1 PUFF: 100; 25 POWDER RESPIRATORY (INHALATION) at 06:31

## 2023-11-14 RX ADMIN — IPRATROPIUM BROMIDE AND ALBUTEROL SULFATE 3 ML: 2.5; .5 SOLUTION RESPIRATORY (INHALATION) at 20:30

## 2023-11-14 RX ADMIN — DOXYCYCLINE 100 MG: 100 INJECTION, POWDER, LYOPHILIZED, FOR SOLUTION INTRAVENOUS at 21:31

## 2023-11-14 RX ADMIN — GUAIFENESIN 1200 MG: 600 TABLET ORAL at 21:31

## 2023-11-14 RX ADMIN — CLOPIDOGREL 75 MG: 75 TABLET ORAL at 12:22

## 2023-11-14 RX ADMIN — ASPIRIN 81 MG: 81 TABLET, COATED ORAL at 12:22

## 2023-11-14 RX ADMIN — Medication 3 MG: at 21:31

## 2023-11-14 RX ADMIN — HEPARIN SODIUM 5000 UNITS: 5000 INJECTION INTRAVENOUS; SUBCUTANEOUS at 05:06

## 2023-11-14 ASSESSMENT — ENCOUNTER SYMPTOMS
FEVER: 0
CONSTIPATION: 0
DYSURIA: 0
SORE THROAT: 0
FACIAL SWELLING: 0
LIGHT-HEADEDNESS: 0
CHEST TIGHTNESS: 0
BACK PAIN: 0
FLANK PAIN: 0
WOUND: 0
WEAKNESS: 0
ABDOMINAL PAIN: 0
HALLUCINATIONS: 0
HEMATURIA: 0
PALPITATIONS: 0
DIARRHEA: 0
APPETITE CHANGE: 0
COUGH: 1
BRUISES/BLEEDS EASILY: 0
SHORTNESS OF BREATH: 0
NUMBNESS: 0
BLOOD IN STOOL: 0
NAUSEA: 0
EYE PAIN: 0
CHILLS: 0
JOINT SWELLING: 0
HEADACHES: 0
VOMITING: 0
FATIGUE: 0
TROUBLE SWALLOWING: 0
WHEEZING: 0
DIAPHORESIS: 0
FREQUENCY: 0
DIZZINESS: 0

## 2023-11-14 ASSESSMENT — PAIN SCALES - WONG BAKER
WONGBAKER_NUMERICALRESPONSE: NO HURT

## 2023-11-14 ASSESSMENT — COGNITIVE AND FUNCTIONAL STATUS - GENERAL
DAILY ACTIVITIY SCORE: 14
MOVING TO AND FROM BED TO CHAIR: A LOT
DRESSING REGULAR UPPER BODY CLOTHING: A LOT
TOILETING: A LOT
HELP NEEDED FOR BATHING: A LOT
CLIMB 3 TO 5 STEPS WITH RAILING: A LOT
EATING MEALS: A LITTLE
MOVING FROM LYING ON BACK TO SITTING ON SIDE OF FLAT BED WITH BEDRAILS: A LITTLE
HELP NEEDED FOR BATHING: A LOT
PERSONAL GROOMING: A LITTLE
DRESSING REGULAR UPPER BODY CLOTHING: A LITTLE
MOVING TO AND FROM BED TO CHAIR: A LOT
MOBILITY SCORE: 13
TURNING FROM BACK TO SIDE WHILE IN FLAT BAD: A LOT
DAILY ACTIVITIY SCORE: 15
STANDING UP FROM CHAIR USING ARMS: A LOT
WALKING IN HOSPITAL ROOM: A LOT
DAILY ACTIVITIY SCORE: 15
WALKING IN HOSPITAL ROOM: A LOT
CLIMB 3 TO 5 STEPS WITH RAILING: A LOT
DRESSING REGULAR LOWER BODY CLOTHING: A LOT
MOVING FROM LYING ON BACK TO SITTING ON SIDE OF FLAT BED WITH BEDRAILS: A LITTLE
TURNING FROM BACK TO SIDE WHILE IN FLAT BAD: A LOT
HELP NEEDED FOR BATHING: A LOT
TOILETING: A LOT
DRESSING REGULAR LOWER BODY CLOTHING: A LOT
PERSONAL GROOMING: A LITTLE
EATING MEALS: A LITTLE
PERSONAL GROOMING: A LITTLE
DRESSING REGULAR LOWER BODY CLOTHING: A LOT
STANDING UP FROM CHAIR USING ARMS: A LOT
DRESSING REGULAR UPPER BODY CLOTHING: A LITTLE
MOBILITY SCORE: 13
EATING MEALS: A LITTLE
TOILETING: A LOT

## 2023-11-14 ASSESSMENT — PAIN SCALES - GENERAL
PAINLEVEL_OUTOF10: 0 - NO PAIN

## 2023-11-14 ASSESSMENT — PAIN - FUNCTIONAL ASSESSMENT: PAIN_FUNCTIONAL_ASSESSMENT: 0-10

## 2023-11-14 NOTE — PROGRESS NOTES
Occupational Therapy    OT Treatment    Patient Name: Hai Laboy  MRN: [id number]  Today's Date: 11/14/2023  Time Calculation  Start Time: 1552  Stop Time: 1600  Time Calculation (min): 8 min         Assessment:  End of Session Communication: Bedside nurse, PCT/NA/CTA  End of Session Patient Position: Bed, 2 rail up (in supine  with nursing addressing care)       Plan:  Treatment Interventions: ADL retraining, Endurance training, Functional transfer training    Subjective     Current Problem:  Patient Active Problem List   Diagnosis    Atherosclerosis of both carotid arteries    BPH (benign prostatic hyperplasia)    Cerebrovascular accident (CVA) (CMS/HCC)    COPD (chronic obstructive pulmonary disease) (CMS/HCC)    Hyperlipidemia    Lung nodule seen on imaging study    New onset type 2 diabetes mellitus (CMS/HCC)    Prostate cancer (CMS/HCC)    Urinary urgency    Routine general medical examination at health care facility    Acute pneumonia    Acute hypoxic respiratory failure (CMS/HCC)    Altered mental status    Encephalopathy    Cerebral ventriculomegaly    Sepsis (CMS/HCC)    Occlusion of right vertebral artery    Altered mental status, unspecified altered mental status type    Acute exacerbation of chronic obstructive pulmonary disease (COPD) (CMS/HCC)    Acute metabolic encephalopathy    History of stroke    HTN (hypertension)       General:     Patient Position Received:  (Edge of bed with PCA)  General Comment: pt. cooperative needed cueing for tech to get back to bed  via side steping and getting sit to supine.  Pt. having bleeding above purwick from heparin shot. RN notified and addressing pt. cleaning up. pt. thanked this therapist for assisting him.        Pain:  Pain Assessment  Pain Score: 0 - No pain  Objective      Activities of Daily Living:  nursing addressing self care as patient soiled clothing.                       Functional Standing Tolerance:  Time: 1 min  Activity: standing with FWW up  toward HOB  Functional Standing Tolerance Comments: MIN A x1    Bed Mobility/Transfers: Bed Mobility 1  Bed Mobility 1: Sitting to supine  Level of Assistance 1: Minimum assistance, Minimal verbal cues, Minimal tactile cues  Transfers  Transfer: Yes  Transfer 1  Transfer From 1: Sit to  Transfer to 1: Stand  Technique 1: Sit to stand, Stand to sit  Transfer Device 1: Walker (FWW)  Transfer Level of Assistance 1: Minimum assistance, Minimal verbal cues, Minimal tactile cues  Trials/Comments 1: cues for technique i.e. hand placement and positioning of FWW                       Outcome Measures:[facility] Daily Activity  Putting on and taking off regular lower body clothing: A lot  Bathing (including washing, rinsing, drying): A lot  Putting on and taking off regular upper body clothing: A little  Toileting, which includes using toilet, bedpan or urinal: A lot  Taking care of personal grooming such as brushing teeth: A little  Eating Meals: A little  Daily Activity - Total Score: 15  Education Documentation  Body Mechanics, taught by JOSE Saha at 11/14/2023  4:16 PM.  Learner: Patient  Readiness: Acceptance  Method: Explanation  Response: Needs Reinforcement    Precautions, taught by JOSE Saha at 11/14/2023  4:16 PM.  Learner: Patient  Readiness: Acceptance  Method: Explanation  Response: Needs Reinforcement    Education Comments  No comments found.        EDUCATION:  Education  Education Comment: safety with mobility    Goals:  Encounter Problems       Encounter Problems (Active)       ADLs       Patient will complete daily grooming tasks brushing teeth and washing face/hair with stand by assist level of assistance and PRN adaptive equipment while standing. (Not Progressing)       Start:  11/13/23    Expected End:  11/17/23            Patient will complete toileting including hygiene clothing management/hygiene with stand by assist level of assistance and raised toilet seat and grab bars. (Not  Progressing)       Start:  11/13/23    Expected End:  11/17/23               MOBILITY       Patient will perform Functional mobility max Household distances/Community Distances with modified independent level of assistance and least restrictive device in order to improve safety and functional mobility. (Progressing)       Start:  11/13/23    Expected End:  11/17/23

## 2023-11-14 NOTE — CARE PLAN
The patient's goals for the shift include      The clinical goals for the shift include pt willremain free from falls and injury this shift and free from any SOB this shift.    Over the shift, the patient did not make progress toward the following goals. Barriers to progression include n/a. Recommendations to address these barriers include n/a.

## 2023-11-14 NOTE — PROGRESS NOTES
Social work consult placed for competency/decision making, per TCC JACINDA Campbell RN, spoke with pt's friend Angel Rodríguez, pt has a POA, Aaron Sky. Pt is A&O x2, needing POA to make decision regarding discharge. Social work placed call to LUZ Sarmiento requesting call back with POA contact info, SW received call from Shayan Tinoco (pt's cousin), making sure SW is proper contact, sent SW number to Aaron Jose Daniel. Aaron called SW, CHARISSA asked if he had POA paperwork, Aaron confirmed he did, had his daughter Rianna assist in e-mailing SW copy. Social Work received copy, confirmed Aaron is listed as POA, paperwork is missing third page, requested Aaron and Rianna send social work missing page. Social Work to follow for any continued needs.     TALIA Castellanos, RICKIE ([id number])   Care Transitions     Social work received full POA paperwork listing Aaron Sky ([phone]) as healthcare POA. Informed TCC, placed copy in pt's chart, SW will continue to follow for any needs.    TALIA Castellanos LSW ([id number])   Care Transitions    Per chart review, TCC and PCN have been working with POA for placement, no further SW needs identified at this time, SW signing off, available upon request.    TALIA Castellanos LSW ([id number])   Care Transitions

## 2023-11-14 NOTE — PROGRESS NOTES
I met with pt this morning to discuss DC planning.  Pt focused on why he is here, why would I care about him,  cost of being here and who paying. Attempts to redirect to current topic were short lived before he would return to the above points.  I told him that I spoke with Angel Rodríguez, a long time friend who is pts contact-pt  said to call yesterday, and relayed my conversation to patient.  Angel set up aides with [facility] 2 hours a day/5days a week; has [facility] (unsure of company) for PT/OT post [facility]; pt stays in lift chair with minimal self care or activity.  Pt told me that he is comfortable in his chair.  Angel revealed that pt was able to walk with walker post discharge from [facility] last month but not demonstrating this mobility now.  He said that pt does have POA and has contacted pts  to try to get a copy but no response back.  He said that the POA is Angel Sky, a friend that lives in [place]; he is unsure when the last time he has had any contact with pt.  Pt was agreeable to going to rehab again but wanted to reach out to POA.  This was all discussed with KERI Leslie who is placing SW consult to assist with POA confirmation.    Referral given to CHICO Do, RT to send referral to [facility] while sorting out POA situation.    1600 Placed call to pts POA, Angel Sky, to discuss SNF but line busy. Will have try call tomorrow.

## 2023-11-14 NOTE — PROGRESS NOTES
Notified By Nichelle Campbell RN TCC  that pt preference for SNF is [facility],  Pt was recently there and had a good stay.  Referral to be sent    11.15 @8347 [facility] accepted - needs PT notes for precert    11.16 @1014 precert approved    11.16.23@9737 I have spoken w RENA Laboy, he does not want pt to go to [facility], prefers pt to go to [facility], [facility] supervisor Elizabeth was notified

## 2023-11-14 NOTE — CARE PLAN
The patient's goals for the shift include    Problem: Fall/Injury  Goal: Not fall by end of shift  Outcome: Progressing  Goal: Be free from injury by end of the shift  Outcome: Progressing  Goal: Verbalize understanding of personal risk factors for fall in the hospital  Outcome: Progressing  Goal: Verbalize understanding of risk factor reduction measures to prevent injury from fall in the home  Outcome: Progressing  Goal: Use assistive devices by end of the shift  Outcome: Progressing  Goal: Pace activities to prevent fatigue by end of the shift  Outcome: Progressing     Problem: Respiratory  Goal: Clear secretions with interventions this shift  Outcome: Progressing  Goal: Minimal/no exertional discomfort or dyspnea this shift  Outcome: Progressing  Goal: Patent airway maintained this shift  Outcome: Progressing  Goal: Wean oxygen to maintain O2 saturation per order/standard this shift  Outcome: Progressing     Problem: Pain - Adult  Goal: Verbalizes/displays adequate comfort level or baseline comfort level  Outcome: Progressing     Problem: Safety - Adult  Goal: Free from fall injury  Outcome: Progressing     Problem: Discharge Planning  Goal: Discharge to home or other facility with appropriate resources  Outcome: Progressing     Problem: Chronic Conditions and Co-morbidities  Goal: Patient's chronic conditions and co-morbidity symptoms are monitored and maintained or improved  Outcome: Progressing     Problem: Skin  Goal: Decreased wound size/increased tissue granulation at next dressing change  Outcome: Progressing  Flowsheets (Taken 11/14/2023 1538)  Decreased wound size/increased tissue granulation at next dressing change: Protective dressings over bony prominences  Goal: Participates in plan/prevention/treatment measures  Outcome: Progressing  Flowsheets (Taken 11/14/2023 1531)  Participates in plan/prevention/treatment measures: Increase activity/out of bed for meals  Goal: Prevent/manage excess  moisture  Outcome: Progressing  Goal: Prevent/minimize sheer/friction injuries  Outcome: Progressing  Flowsheets (Taken 11/14/2023 1539)  Prevent/minimize sheer/friction injuries: Turn/reposition every 2 hours/use positioning/transfer devices  Goal: Promote/optimize nutrition  Outcome: Progressing  Flowsheets (Taken 11/14/2023 1539)  Promote/optimize nutrition: Consume > 50% meals/supplements  Goal: Promote skin healing  Outcome: Progressing     Problem: Diabetes  Goal: Achieve decreasing blood glucose levels by end of shift  Outcome: Progressing  Goal: Increase stability of blood glucose readings by end of shift  Outcome: Progressing  Goal: Decrease in ketones present in urine by end of shift  Outcome: Progressing  Goal: Maintain electrolyte levels within acceptable range throughout shift  Outcome: Progressing  Goal: Maintain glucose levels >70mg/dl to <250mg/dl throughout shift  Outcome: Progressing  Goal: No changes in neurological exam by end of shift  Outcome: Progressing  Goal: Learn about and adhere to nutrition recommendations by end of shift  Outcome: Progressing  Goal: Vital signs within normal range for age by end of shift  Outcome: Progressing  Goal: Increase self care and/or family involovement by end of shift  Outcome: Progressing  Goal: Receive DSME education by end of shift  Outcome: Progressing       The clinical goals for the shift include pt willremain free from falls and injury this shift and free from any SOB this shift.    No s/s of distress noted. See assessment and mar. Remains on room air. Tele maintained. Pt confused throughout shift.

## 2023-11-14 NOTE — PROGRESS NOTES
"Hai Laboy is a 78 y.o. male on day 2 of admission presenting with Altered mental status, unspecified altered mental status type.      Subjective   Hai Laboy is a 78 y.o. male with PMHx s/f HTN, HLD, COPD (no baseline O2), prior stroke, prostate cancer, and recent admission to [facility] for AMS/PNA/hypoxia, presented 11/12/23 with change in mental status and shortness of breath. Patient is oriented to self, can tell me he is in a hospital in [place], and can report the president as Reanna; however, he reports the year is [id number] and it is February and becomes quickly frustrated with questions, stating \"I don't know, why are you asking me?\". Patient presents via EMS for a reported \"slide\" out of his chair at home while attempting to get his inhaler while feeling short of breath. Pt would not elaborate for me on the fall, but did admit he has been feeling increasingly SOB over the last few days, but especially today. He is reportedly living at home but has a friend that helps check on him. He is following commands appropriately but does easily get frustrated. He feels SOB has improved since presentation and believes he does need oxygen at home or he will come back time and time again. Initial VBG with a pH of 7.32, PCO2 59, PO2 28, calculated bicarb 30.4.  Repeat with a pH of 7.40, PCO2 47, PO2 55, calculated bicarb 29.1. Lactate downtrending from 3.1-2.1. CXR without acute process.  CT head without IV contrast negative for acute process, is notable for chronic changes and ventricular prominence which was noted during last hospitalization. TSH WNL. UA without UTI.     11/14/23:   No acute events overnight. Vitals stable, 96% on RA since 11:30 AM yesterday. Labs largely unremarkable, slight leukocytosis likely from steroids. Start dc planning to SNF. SW consult to assist getting into contact with his POA [facility] in [place]           Review of Systems   Constitutional:  Negative for appetite change, chills, " diaphoresis, fatigue and fever.   HENT:  Negative for congestion, ear pain, facial swelling, hearing loss, nosebleeds, sore throat, tinnitus and trouble swallowing.    Eyes:  Negative for pain.   Respiratory:  Positive for cough. Negative for chest tightness, shortness of breath (On exertion) and wheezing.    Cardiovascular:  Negative for chest pain, palpitations and leg swelling.   Gastrointestinal:  Negative for abdominal pain, blood in stool, constipation, diarrhea, nausea and vomiting.   Genitourinary:  Negative for dysuria, flank pain, frequency, hematuria and urgency.   Musculoskeletal:  Negative for back pain and joint swelling.   Skin:  Negative for rash and wound.   Neurological:  Negative for dizziness, syncope, weakness, light-headedness, numbness and headaches.   Hematological:  Does not bruise/bleed easily.   Psychiatric/Behavioral:  Negative for behavioral problems, hallucinations and suicidal ideas.           Objective     Last Recorded Vitals  /68 (Patient Position: Lying)   Pulse 58   Temp 36.9 °C (98.4 °F) (Temporal)   Resp 18   Wt 56.1 kg (123 lb 10.9 oz)   SpO2 95%     Image Results  CT head wo IV contrast    Result Date: 11/12/2023  Interpreted By:  Sabrina Ramirez, STUDY: CT HEAD WO IV CONTRAST;  11/12/2023 6:01 pm   INDICATION: Signs/Symptoms:confusion.   COMPARISON: 10/16/2023   ACCESSION NUMBER(S): [id number]   ORDERING CLINICIAN: CASSIDY MEREDITH   TECHNIQUE: Axial noncontrast CT images of the head.   FINDINGS: BRAIN PARENCHYMA: Extensive parenchymal volume loss, nonspecific white matter changes and calcifications of the carotid arteries. No mass effect or midline shift. Focus of encephalomalacia in the right cerebellar hemisphere and extending from the left middle cerebellar peduncle into the left cerebellar hemisphere, similar to prior imaging. Punctate calcifications within the right sulci similar to prior imaging.   HEMORRHAGE: No acute intracranial hemorrhage. VENTRICLES and  EXTRA-AXIAL SPACES: Margin of the lateral and 3rd ventricles are again noted which may be seen with central white matter loss or normal pressure hydrocephalus.. Ex vacuo dilatation of the 4th ventricle likely related to prior infarct. EXTRACRANIAL SOFT TISSUES: Within normal limits. PARANASAL SINUSES/MASTOIDS: The visualized paranasal sinuses and mastoid air cells are aerated. CALVARIUM: No depressed skull fracture. No destructive osseous lesion.   OTHER FINDINGS: None.       No acute intracranial hemorrhage or mass effect.   Chronic white matter changes and sequela of remote posterior fossa infarcts again noted.   Ventricular prominence again noted.   MACRO: None.   Signed by: Sabrina Ramirez 11/12/2023 6:33 PM Dictation workstation:   [id number]    XR chest 1 view    Result Date: 11/12/2023  Interpreted By:  Bhavesh Barros, STUDY: XR CHEST 1 VIEW;  11/12/2023 4:33 pm   INDICATION: Signs/Symptoms:cough, sob.   COMPARISON: 11/07/2023.   ACCESSION NUMBER(S): [id number]   ORDERING CLINICIAN: CASSIDY MEREDITH   FINDINGS: No consolidation. No pleural effusion or pneumothorax. Hyperinflated lungs suggestive of COPD. Right-sided pleural calcifications unchanged. Left pleural calcification also present. Asymmetric elevation of the left hemidiaphragm. Atherosclerosis. Normal heart size. No acute osseous abnormality.       No acute cardiopulmonary abnormality.   Signed by: Bhavesh Barros 11/12/2023 4:45 PM Dictation workstation:   [id number]    CT angio chest for pulmonary embolism    Result Date: 11/7/2023  Interpreted By:  Ramana Ortega, STUDY: CT ANGIO CHEST FOR PULMONARY EMBOLISM; 11/7/2023 1:10 pm   INDICATION: Signs/Symptoms:SOB.   COMPARISON: CT chest dated 10/16/2023.   ACCESSION NUMBER(S): [id number]   ORDERING CLINICIAN: TRISHA LANDON   TECHNIQUE: Contiguous axial CT images were obtained through the chest at 2 mm slice thickness following administration of intravenous contrast utilizing pulmonary artery  bolus tracking. 50 cc of Omnipaque 350 was administered. The images were then reconstructed in the coronal and sagittal planes. MIP images were created and reviewed.   FINDINGS: POTENTIAL LIMITATIONS OF THE STUDY: None   HEART and VESSELS: There is dense opacification of the pulmonary arterial system. No intraluminal filling defect is seen within the pulmonary arteries to suggest acute pulmonary embolus.   The heart is within normal limits for size. No pericardial effusion is identified. Dense atherosclerotic calcifications are seen in the coronary arteries. Mild aortic valve calcifications are present.   Moderate atherosclerotic calcifications are seen in the aortic arch and descending thoracic aorta , including at the origins of the arch vessels. The thoracic aorta is within normal limits for course and caliber, without evidence of aneurysm.   MEDIASTINUM and SHANTA, LOWER NECK AND AXILLA: Evaluation of the visualized neck base demonstrates no gross mass or lymphadenopathy.   There is no gross axillary, hilar or mediastinal lymphadenopathy identified.   LUNGS and AIRWAYS: The trachea and central airways are grossly patent without evidence of endobronchial lesion.   Mild-to-moderate emphysematous changes are seen in the lungs bilaterally. Focal airspace consolidation is seen in the posteromedial aspect of the left mid lung, similar to the prior study, and may represent scarring/chronic atelectasis. Dense pleural calcifications are seen in the right lung. No discrete pulmonary nodules or masses are seen, though evaluation is limited within the regions of airspace consolidation..   UPPER ABDOMEN: Evaluation of the visualized upper abdomen demonstrates no discrete mass or lymphadenopathy.   CHEST WALL and OSSEOUS STRUCTURES: There is no evidence of acute fracture identified. Multilevel degenerative changes are seen in the thoracic spine.       1. No evidence of acute pulmonary embolus. 2. Left lower lobe airspace  consolidation, most consistent with scarring/chronic atelectasis. 3. Emphysematous changes in the lungs bilaterally. 4. Extensive pleural calcifications in the right chest.   MACRO: None.     Signed by: Ramana Ortega 11/7/2023 1:30 PM Dictation workstation:   [id number]    XR chest 2 views    Result Date: 11/7/2023  Interpreted By:  Aretha Skelton, STUDY: XR CHEST 2 VIEWS;  11/7/2023 1:11 pm   INDICATION: Signs/Symptoms:cough.   COMPARISON: 10/16/2023   ACCESSION NUMBER(S): [id number]   ORDERING CLINICIAN: TRISHA LANDON   FINDINGS: Prominent calcified pleural plaques are seen. Emphysematous changes are noted. Elevation of the left hemidiaphragm is again seen, which is unchanged. The heart is not enlarged. No consolidation, pleural effusion pneumothorax is noted.       Chronic lung changes.     MACRO: None   Signed by: Aretha Skelton 11/7/2023 1:16 PM Dictation workstation:   [id number]    CT head wo IV contrast    Result Date: 10/16/2023  Interpreted By:  Topher Hendricks, STUDY: CT HEAD WO IV CONTRAST;  10/16/2023 8:05 pm   INDICATION: Signs/Symptoms:new confusion.   COMPARISON: CT head 06/07/2023   ACCESSION NUMBER(S): [id number]   ORDERING CLINICIAN: FRANKLIN WELCH   TECHNIQUE: Noncontrast axial CT images of head were obtained with coronal and sagittal reconstructed images.   FINDINGS: BRAIN PARENCHYMA: Mild generalized cerebral volume loss. No evidence of acute large territory infarction or transcortical edema. The deep gray structures are preserved. No mass-effect, midline shift or effacement of cerebral sulci. Confluent periventricular and subcortical white matter hypodensities, nonspecific but often seen in the setting of chronic microangiopathic disease.   HEMORRHAGE: No acute intracranial hemorrhage.   VENTRICLES and EXTRA-AXIAL SPACES: Prominence of the lateral and 3rd ventricles greater than expected for degree of cerebral volume loss. No abnormal extra-axial fluid collection.   ORBITS: The  visualized orbits and globes are within normal limits.   EXTRACRANIAL SOFT TISSUES: Within normal limits.   PARANASAL SINUSES/MASTOIDS: The visualized paranasal sinuses and mastoid air cells are well aerated.   CALVARIUM: No depressed skull fracture.   Brain Injury Guidelines (BIG) CT Values:   Skull fracture: No SDH (subdural hematoma): No EDH (epidural hematoma): No IPH (intraparenchymal hemorrhage): No SAH (subarachnoid hemorrhage): No IVH (intraventricular hemorrhage): No   Reference: Carl JENKINS, Judy JEFFERSON, Everton M, et al. The BIG (brain injury guidelines) project: defining the management of traumatic brain injury by acute care surgeons. J Trauma Acute Care Surg 2014; 76:965-969.       1. No acute intracranial abnormality identified. 2. Ventricular prominence greater than expected for degree of cerebral volume loss, consider normal pressure hydrocephalus or other hydrocephalus. 3. Chronic white matter changes likely related to small-vessel ischemic disease.       MACRO: None   Signed by: Topher Hendricks 10/16/2023 8:45 PM Dictation workstation:   [id number]    CT angio chest for pulmonary embolism    Result Date: 10/16/2023  Interpreted By:  Topher Hendricks, STUDY: CT ANGIO CHEST FOR PULMONARY EMBOLISM;  10/16/2023 8:05 pm   INDICATION: Signs/Symptoms:SOB, elevated dimer.   COMPARISON: CT chest 03/09/2023   ACCESSION NUMBER(S): [id number]   ORDERING CLINICIAN: TOPHER WANG   TECHNIQUE: Contiguous axial images of the chest were obtained after the intravenous administration of 75 mL Omnipaque 350 contrast using angiographic PE protocol. Coronal and sagittal reformatted images were reconstructed from the axial data. MIP images were created on an independent workstation and reviewed.   FINDINGS: PULMONARY ARTERIES: Adequate opacification to the level of the segmental arteries. The subsegmental arteries are suboptimally assessed due to mixing artifact and respiratory motion. No filling defect to suggest  pulmonary embolus in the visualized pulmonary arteries. The main pulmonary artery is normal in diameter. No CT evidence of right heart strain.   HEART: Normal in size. Moderate to heavy triple-vessel coronary vascular calcifications. Calcifications in the plane of the mitral valve. No significant pericardial effusion.   VESSELS: Normal caliber aorta without dissection. Heavy calcifications of the aortic arch and proximal great vessels as well as the descending thoracic aorta.   MEDIASTINUM AND LYMPH NODES: Visualized thyroid is within normal limits. No enlarged intrathoracic or axillary lymph nodes by imaging criteria. No pneumomediastinum. The esophagus appears within normal limits.   LUNG, AIRWAYS, AND PLEURA: Dependent debris within the distal trachea. Left lower lobe bronchial wall thickening with adjacent patchy and consolidative opacities. There is a background of diffuse centrilobular and paraseptal emphysematous change. Bibasilar scarring. Bandlike scarring in the right apex. No pleural effusion or pneumothorax. Scattered bilateral pleural calcifications most pronounced along the pleural surfaces of the right lower lobe. Findings suggest prior asbestos exposure.   OSSEOUS STRUCTURES: No acute osseous abnormality.   CHEST WALL SOFT TISSUES: No discernible abnormality.   UPPER ABDOMEN/OTHER: Heavy vascular calcifications in the upper abdomen.       1. No evidence of pulmonary embolus to the level of the segmental arteries. Filling defects within the subsegmental arteries can not be entirely excluded. 2. Bronchial wall thickening in the medial left lower lobe with adjacent patchy and consolidative opacities. Findings are most suggestive of pneumonia or aspiration pneumonitis. Follow-up is recommended to ensure resolution and exclude underlying lesion. 3. Diffuse background of emphysematous changes and pleural calcifications suggestive of prior asbestos exposure. 4. Please see additional findings and discussion  as above.   MACRO: None   Signed by: Topher Hendricks 10/16/2023 8:36 PM Dictation workstation:   [id number]    XR chest 1 view    Result Date: 10/16/2023  Interpreted By:  Paco Andrews, STUDY: XR CHEST 1 VIEW   INDICATION: Signs/Symptoms:hypoxia, confusion.   COMPARISON: June 7, 2023   ACCESSION NUMBER(S): [id number]   ORDERING CLINICIAN: FRANKLIN WELCH   FINDINGS: Extensive pleural-based calcifications in the bilateral lungs right worse than left, similar to prior study.   No new consolidation or edema. No evidence of pneumothorax or effusion.       Extensive calcified pleural plaques suggesting asbestos exposure similar to prior study. No evidence of acute intrathoracic abnormality.   Signed by: Paco Andrews 10/16/2023 5:27 PM Dictation workstation:   [id number]       Lab Results  Results for orders placed or performed during the hospital encounter of 11/12/23 (from the past 24 hour(s))   POCT GLUCOSE   Result Value Ref Range    POCT Glucose 197 (H) 74 - 99 mg/dL   Basic Metabolic Panel   Result Value Ref Range    Glucose 141 (H) 74 - 99 mg/dL    Sodium 140 136 - 145 mmol/L    Potassium 5.1 3.5 - 5.3 mmol/L    Chloride 108 (H) 98 - 107 mmol/L    Bicarbonate 27 21 - 32 mmol/L    Anion Gap 10 10 - 20 mmol/L    Urea Nitrogen 22 6 - 23 mg/dL    Creatinine 0.91 0.50 - 1.30 mg/dL    eGFR 85 >60 mL/min/1.73m*2    Calcium 8.9 8.6 - 10.3 mg/dL   CBC   Result Value Ref Range    WBC 12.4 (H) 4.4 - 11.3 x10*3/uL    nRBC 0.0 0.0 - 0.0 /100 WBCs    RBC 3.92 (L) 4.50 - 5.90 x10*6/uL    Hemoglobin 12.1 (L) 13.5 - 17.5 g/dL    Hematocrit 35.9 (L) 41.0 - 52.0 %    MCV 92 80 - 100 fL    MCH 30.9 26.0 - 34.0 pg    MCHC 33.7 32.0 - 36.0 g/dL    RDW 14.4 11.5 - 14.5 %    Platelets 182 150 - 450 x10*3/uL   Magnesium   Result Value Ref Range    Magnesium 2.03 1.60 - 2.40 mg/dL   POCT GLUCOSE   Result Value Ref Range    POCT Glucose 140 (H) 74 - 99 mg/dL        Medications  Scheduled medications:  aspirin, 81 mg, oral,  Daily  atorvastatin, 10 mg, oral, Daily  clopidogrel, 75 mg, oral, Daily  doxycycline (Vibramycin) 100 mg in sodium chloride 0.9 % 100 mL IV, 100 mg, intravenous, q12h  tiotropium, 2 Inhalation, inhalation, Daily   And  fluticasone furoate-vilanteroL, 1 puff, inhalation, Daily  guaiFENesin, 1,200 mg, oral, BID  heparin (porcine), 5,000 Units, subcutaneous, q8h  ipratropium-albuteroL, 3 mL, nebulization, TID  lisinopril, 40 mg, oral, Daily  melatonin, 3 mg, oral, Daily  methylPREDNISolone sodium succinate (PF), 40 mg, intravenous, q8h CAITY  pantoprazole, 40 mg, oral, Daily before breakfast   Or  pantoprazole, 40 mg, intravenous, Daily before breakfast  polyethylene glycol, 17 g, oral, Daily      Continuous medications:     PRN medications:  PRN medications: acetaminophen, albuterol, bisacodyl, bisacodyl, ondansetron **OR** ondansetron     Physical Exam  Constitutional:       General: He is not in acute distress.     Appearance: Normal appearance.      Comments: Cachexia   HENT:      Head: Normocephalic and atraumatic.      Right Ear: External ear normal.      Left Ear: External ear normal.      Nose: Nose normal.      Mouth/Throat:      Mouth: Mucous membranes are moist.      Pharynx: Oropharynx is clear.   Eyes:      Extraocular Movements: Extraocular movements intact.      Conjunctiva/sclera: Conjunctivae normal.      Pupils: Pupils are equal, round, and reactive to light.   Cardiovascular:      Rate and Rhythm: Normal rate and regular rhythm.      Pulses: Normal pulses.      Heart sounds: Normal heart sounds.   Pulmonary:      Effort: Pulmonary effort is normal. No respiratory distress.      Breath sounds: Wheezing present. No rhonchi or rales.      Comments: NC in place, on 1L  Abdominal:      General: Abdomen is flat. Bowel sounds are normal.      Palpations: Abdomen is soft.      Tenderness: There is no abdominal tenderness. There is no right CVA tenderness, left CVA tenderness, guarding or rebound.    Musculoskeletal:         General: No swelling. Normal range of motion.      Cervical back: Normal range of motion and neck supple.   Skin:     General: Skin is warm and dry.      Capillary Refill: Capillary refill takes less than 2 seconds.      Findings: No lesion or rash.   Neurological:      General: No focal deficit present.      Mental Status: He is alert. Mental status is at baseline. He is disoriented.      Comments: Oriented to person, place, situation  States it is 2003, unable to state his age  Knows the President is Reanna  States it is February   Psychiatric:         Mood and Affect: Mood normal.         Behavior: Behavior normal.                  Code Status  Full Code     Assessment/Plan      Acute exacerbation of chronic obstructive pulmonary disease (COPD) (CMS/[facility])  Assessment & Plan  -Pt with wheezing on exam and overall diminished breath sounds  -Imaging and labs otherwise unrevealing   -Continued on home therapies, DuoNebs  -Continued on IV steroids  -Empiric doxycycline   -RT c/s appreciated   -Pulm hygiene     Acute hypoxic respiratory failure (CMS/[facility])  Assessment & Plan  -Treat COPD  -Wean O2 as able  -Home O2 eval prior to discharge     HTN (hypertension)  Assessment & Plan  -Confirm and resume home therapies  -BP slightly elevated during presentation, suspect stress related. Continue to follow.     History of stroke  Assessment & Plan  -Continue DAPT, continue statin   -Continue follow-up as mentioned above     Acute metabolic encephalopathy  Assessment & Plan  -CT head with ventricular prominence as noted during last hospitalization.  Neurology recommended management of any acute issues and to continue dual antiplatelet and statin, have follow-up with neurosurgery in the outpatient setting in the future.  -UA negative for UTI  -TSH WNL  -No obvious focal or lateralizing deficits, appears patient is back to what appears to be his most recent baseline from his last  hospitalization    Cerebral ventriculomegaly  Assessment & Plan  -CT head with ventricular prominence as noted during last hospitalization.  Neurology recommended management of any acute issues and to continue dual antiplatelet and statin, have follow-up with neurosurgery in the outpatient setting in the future.    Prostate cancer (CMS/HCC)  Assessment & Plan  -Follow-up as directed     Hyperlipidemia  Assessment & Plan  -Confirm and resume home therapies             DVT ppx: subcutaneous heparin       Please see orders for more complete plan    Tressa Davila PA-C

## 2023-11-15 LAB
ANION GAP SERPL CALC-SCNC: 11 MMOL/L (ref 10–20)
BUN SERPL-MCNC: 23 MG/DL (ref 6–23)
CALCIUM SERPL-MCNC: 8.5 MG/DL (ref 8.6–10.3)
CHLORIDE SERPL-SCNC: 105 MMOL/L (ref 98–107)
CO2 SERPL-SCNC: 26 MMOL/L (ref 21–32)
CREAT SERPL-MCNC: 0.83 MG/DL (ref 0.5–1.3)
ERYTHROCYTE [DISTWIDTH] IN BLOOD BY AUTOMATED COUNT: 14.1 % (ref 11.5–14.5)
GFR SERPL CREATININE-BSD FRML MDRD: 88 ML/MIN/1.73M*2
GLUCOSE BLD MANUAL STRIP-MCNC: 145 MG/DL (ref 74–99)
GLUCOSE BLD MANUAL STRIP-MCNC: 163 MG/DL (ref 74–99)
GLUCOSE BLD MANUAL STRIP-MCNC: 177 MG/DL (ref 74–99)
GLUCOSE BLD MANUAL STRIP-MCNC: 213 MG/DL (ref 74–99)
GLUCOSE SERPL-MCNC: 144 MG/DL (ref 74–99)
HCT VFR BLD AUTO: 35.4 % (ref 41–52)
HGB BLD-MCNC: 12 G/DL (ref 13.5–17.5)
MAGNESIUM SERPL-MCNC: 1.84 MG/DL (ref 1.6–2.4)
MCH RBC QN AUTO: 31.1 PG (ref 26–34)
MCHC RBC AUTO-ENTMCNC: 33.9 G/DL (ref 32–36)
MCV RBC AUTO: 92 FL (ref 80–100)
NRBC BLD-RTO: 0 /100 WBCS (ref 0–0)
PLATELET # BLD AUTO: 157 X10*3/UL (ref 150–450)
POTASSIUM SERPL-SCNC: 4.7 MMOL/L (ref 3.5–5.3)
RBC # BLD AUTO: 3.86 X10*6/UL (ref 4.5–5.9)
SODIUM SERPL-SCNC: 137 MMOL/L (ref 136–145)
WBC # BLD AUTO: 11.5 X10*3/UL (ref 4.4–11.3)

## 2023-11-15 PROCEDURE — 82374 ASSAY BLOOD CARBON DIOXIDE: CPT | Performed by: PHYSICIAN ASSISTANT

## 2023-11-15 PROCEDURE — 96372 THER/PROPH/DIAG INJ SC/IM: CPT | Performed by: STUDENT IN AN ORGANIZED HEALTH CARE EDUCATION/TRAINING PROGRAM

## 2023-11-15 PROCEDURE — 83735 ASSAY OF MAGNESIUM: CPT | Performed by: PHYSICIAN ASSISTANT

## 2023-11-15 PROCEDURE — 2500000002 HC RX 250 W HCPCS SELF ADMINISTERED DRUGS (ALT 637 FOR MEDICARE OP, ALT 636 FOR OP/ED): Performed by: STUDENT IN AN ORGANIZED HEALTH CARE EDUCATION/TRAINING PROGRAM

## 2023-11-15 PROCEDURE — 36415 COLL VENOUS BLD VENIPUNCTURE: CPT | Performed by: PHYSICIAN ASSISTANT

## 2023-11-15 PROCEDURE — 2500000004 HC RX 250 GENERAL PHARMACY W/ HCPCS (ALT 636 FOR OP/ED): Performed by: STUDENT IN AN ORGANIZED HEALTH CARE EDUCATION/TRAINING PROGRAM

## 2023-11-15 PROCEDURE — 2500000004 HC RX 250 GENERAL PHARMACY W/ HCPCS (ALT 636 FOR OP/ED): Performed by: PHYSICIAN ASSISTANT

## 2023-11-15 PROCEDURE — 97116 GAIT TRAINING THERAPY: CPT | Mod: GP,CQ

## 2023-11-15 PROCEDURE — 9420000001 HC RT PATIENT EDUCATION 5 MIN

## 2023-11-15 PROCEDURE — 1200000002 HC GENERAL ROOM WITH TELEMETRY DAILY

## 2023-11-15 PROCEDURE — 82947 ASSAY GLUCOSE BLOOD QUANT: CPT

## 2023-11-15 PROCEDURE — 85027 COMPLETE CBC AUTOMATED: CPT | Performed by: PHYSICIAN ASSISTANT

## 2023-11-15 PROCEDURE — 99233 SBSQ HOSP IP/OBS HIGH 50: CPT | Performed by: PHYSICIAN ASSISTANT

## 2023-11-15 PROCEDURE — 2500000001 HC RX 250 WO HCPCS SELF ADMINISTERED DRUGS (ALT 637 FOR MEDICARE OP): Performed by: STUDENT IN AN ORGANIZED HEALTH CARE EDUCATION/TRAINING PROGRAM

## 2023-11-15 PROCEDURE — 94640 AIRWAY INHALATION TREATMENT: CPT

## 2023-11-15 RX ORDER — DEXTROSE MONOHYDRATE 100 MG/ML
0.3 INJECTION, SOLUTION INTRAVENOUS ONCE AS NEEDED
Status: DISCONTINUED | OUTPATIENT
Start: 2023-11-15 | End: 2023-11-18 | Stop reason: HOSPADM

## 2023-11-15 RX ORDER — DEXTROSE 50 % IN WATER (D50W) INTRAVENOUS SYRINGE
25
Status: DISCONTINUED | OUTPATIENT
Start: 2023-11-15 | End: 2023-11-18 | Stop reason: HOSPADM

## 2023-11-15 RX ORDER — PREDNISONE 20 MG/1
40 TABLET ORAL DAILY
Status: DISCONTINUED | OUTPATIENT
Start: 2023-11-15 | End: 2023-11-18 | Stop reason: HOSPADM

## 2023-11-15 RX ADMIN — PREDNISONE 40 MG: 20 TABLET ORAL at 11:55

## 2023-11-15 RX ADMIN — METHYLPREDNISOLONE SODIUM SUCCINATE 40 MG: 40 INJECTION, POWDER, LYOPHILIZED, FOR SOLUTION INTRAMUSCULAR; INTRAVENOUS at 06:24

## 2023-11-15 RX ADMIN — DOXYCYCLINE 100 MG: 100 INJECTION, POWDER, LYOPHILIZED, FOR SOLUTION INTRAVENOUS at 09:34

## 2023-11-15 RX ADMIN — IPRATROPIUM BROMIDE AND ALBUTEROL SULFATE 3 ML: 2.5; .5 SOLUTION RESPIRATORY (INHALATION) at 20:38

## 2023-11-15 RX ADMIN — PANTOPRAZOLE SODIUM 40 MG: 40 TABLET, DELAYED RELEASE ORAL at 06:24

## 2023-11-15 RX ADMIN — HEPARIN SODIUM 5000 UNITS: 5000 INJECTION INTRAVENOUS; SUBCUTANEOUS at 13:52

## 2023-11-15 RX ADMIN — Medication 3 MG: at 21:24

## 2023-11-15 RX ADMIN — POLYETHYLENE GLYCOL 3350 17 G: 17 POWDER, FOR SOLUTION ORAL at 09:18

## 2023-11-15 RX ADMIN — ATORVASTATIN CALCIUM 10 MG: 10 TABLET, FILM COATED ORAL at 21:24

## 2023-11-15 RX ADMIN — CLOPIDOGREL 75 MG: 75 TABLET ORAL at 09:18

## 2023-11-15 RX ADMIN — GUAIFENESIN 1200 MG: 600 TABLET ORAL at 09:18

## 2023-11-15 RX ADMIN — HEPARIN SODIUM 5000 UNITS: 5000 INJECTION INTRAVENOUS; SUBCUTANEOUS at 06:24

## 2023-11-15 RX ADMIN — ASPIRIN 81 MG: 81 TABLET, COATED ORAL at 09:18

## 2023-11-15 RX ADMIN — TIOTROPIUM BROMIDE INHALATION SPRAY 2 PUFF: 3.12 SPRAY, METERED RESPIRATORY (INHALATION) at 06:24

## 2023-11-15 RX ADMIN — DOXYCYCLINE 100 MG: 100 INJECTION, POWDER, LYOPHILIZED, FOR SOLUTION INTRAVENOUS at 21:24

## 2023-11-15 RX ADMIN — FLUTICASONE FUROATE AND VILANTEROL TRIFENATATE 1 PUFF: 100; 25 POWDER RESPIRATORY (INHALATION) at 06:24

## 2023-11-15 RX ADMIN — GUAIFENESIN 1200 MG: 600 TABLET ORAL at 21:24

## 2023-11-15 RX ADMIN — HEPARIN SODIUM 5000 UNITS: 5000 INJECTION INTRAVENOUS; SUBCUTANEOUS at 21:24

## 2023-11-15 RX ADMIN — LISINOPRIL 40 MG: 20 TABLET ORAL at 09:18

## 2023-11-15 ASSESSMENT — ENCOUNTER SYMPTOMS
LIGHT-HEADEDNESS: 0
BLOOD IN STOOL: 0
WEAKNESS: 0
SORE THROAT: 0
CHEST TIGHTNESS: 0
DIAPHORESIS: 0
PALPITATIONS: 0
VOMITING: 0
JOINT SWELLING: 0
FLANK PAIN: 0
EYE PAIN: 0
HALLUCINATIONS: 0
WHEEZING: 0
WOUND: 0
COUGH: 1
HEMATURIA: 0
CONSTIPATION: 0
BACK PAIN: 0
ABDOMINAL PAIN: 0
NAUSEA: 0
FACIAL SWELLING: 0
TROUBLE SWALLOWING: 0
CHILLS: 0
FATIGUE: 0
DIZZINESS: 0
SHORTNESS OF BREATH: 0
FREQUENCY: 0
FEVER: 0
NUMBNESS: 0
APPETITE CHANGE: 0
BRUISES/BLEEDS EASILY: 0
DIARRHEA: 0
HEADACHES: 0
DYSURIA: 0

## 2023-11-15 ASSESSMENT — COGNITIVE AND FUNCTIONAL STATUS - GENERAL
STANDING UP FROM CHAIR USING ARMS: A LITTLE
MOVING TO AND FROM BED TO CHAIR: A LITTLE
MOVING FROM LYING ON BACK TO SITTING ON SIDE OF FLAT BED WITH BEDRAILS: A LITTLE
CLIMB 3 TO 5 STEPS WITH RAILING: A LOT
MOVING FROM LYING ON BACK TO SITTING ON SIDE OF FLAT BED WITH BEDRAILS: A LITTLE
MOVING TO AND FROM BED TO CHAIR: A LITTLE
DRESSING REGULAR LOWER BODY CLOTHING: A LITTLE
WALKING IN HOSPITAL ROOM: A LOT
DRESSING REGULAR LOWER BODY CLOTHING: A LITTLE
DRESSING REGULAR UPPER BODY CLOTHING: A LITTLE
STANDING UP FROM CHAIR USING ARMS: A LOT
CLIMB 3 TO 5 STEPS WITH RAILING: A LITTLE
PERSONAL GROOMING: A LITTLE
EATING MEALS: A LITTLE
WALKING IN HOSPITAL ROOM: A LITTLE
TOILETING: A LITTLE
MOBILITY SCORE: 20
TOILETING: A LITTLE
TURNING FROM BACK TO SIDE WHILE IN FLAT BAD: A LITTLE
MOBILITY SCORE: 15
TURNING FROM BACK TO SIDE WHILE IN FLAT BAD: A LITTLE
HELP NEEDED FOR BATHING: A LITTLE
MOVING TO AND FROM BED TO CHAIR: A LITTLE
DRESSING REGULAR UPPER BODY CLOTHING: A LITTLE
HELP NEEDED FOR BATHING: A LITTLE
DAILY ACTIVITIY SCORE: 20
STANDING UP FROM CHAIR USING ARMS: A LOT
WALKING IN HOSPITAL ROOM: A LOT
CLIMB 3 TO 5 STEPS WITH RAILING: A LOT
DAILY ACTIVITIY SCORE: 18
MOBILITY SCORE: 15

## 2023-11-15 ASSESSMENT — PAIN - FUNCTIONAL ASSESSMENT: PAIN_FUNCTIONAL_ASSESSMENT: 0-10

## 2023-11-15 ASSESSMENT — PAIN SCALES - GENERAL: PAINLEVEL_OUTOF10: 0 - NO PAIN

## 2023-11-15 NOTE — PROGRESS NOTES
Spoke with patient's POA via phone, POA spoke with patient via phone, as POA does not live locally. POA states patient unsure about going to [facility] and would address this with POA in the morning. TCC to follow.

## 2023-11-15 NOTE — PROGRESS NOTES
"Hai Laboy is a 78 y.o. male on day 3 of admission presenting with Altered mental status, unspecified altered mental status type.      Subjective   Hai Laboy is a 78 y.o. male with PMHx s/f HTN, HLD, COPD (no baseline O2), prior stroke, prostate cancer, and recent admission to [facility] for AMS/PNA/hypoxia, presented 11/12/23 with change in mental status and shortness of breath. Patient is oriented to self, can tell me he is in a hospital in [place], and can report the president as Reanna; however, he reports the year is [id number] and it is February and becomes quickly frustrated with questions, stating \"I don't know, why are you asking me?\". Patient presents via EMS for a reported \"slide\" out of his chair at home while attempting to get his inhaler while feeling short of breath. Pt would not elaborate for me on the fall, but did admit he has been feeling increasingly SOB over the last few days, but especially today. He is reportedly living at home but has a friend that helps check on him. He is following commands appropriately but does easily get frustrated. He feels SOB has improved since presentation and believes he does need oxygen at home or he will come back time and time again. Initial VBG with a pH of 7.32, PCO2 59, PO2 28, calculated bicarb 30.4.  Repeat with a pH of 7.40, PCO2 47, PO2 55, calculated bicarb 29.1. Lactate downtrending from 3.1-2.1. CXR without acute process.  CT head without IV contrast negative for acute process, is notable for chronic changes and ventricular prominence which was noted during last hospitalization. TSH WNL. UA without UTI.       SW confirmed friend Angel Sky is RENA    11/15/23: No acute events overnight. Vitals stable, on RA since 11/13/23. Labs largely unremarkable, slight leukocytosis likely from steroids.  Discussed with RENA Ortiz- all questions answered, he is unsure if he is willing to agree to SNF as patient reluctant          Review of Systems   Constitutional:  " Negative for appetite change, chills, diaphoresis, fatigue and fever.   HENT:  Negative for congestion, ear pain, facial swelling, hearing loss, nosebleeds, sore throat, tinnitus and trouble swallowing.    Eyes:  Negative for pain.   Respiratory:  Positive for cough. Negative for chest tightness, shortness of breath (On exertion) and wheezing.    Cardiovascular:  Negative for chest pain, palpitations and leg swelling.   Gastrointestinal:  Negative for abdominal pain, blood in stool, constipation, diarrhea, nausea and vomiting.   Genitourinary:  Negative for dysuria, flank pain, frequency, hematuria and urgency.   Musculoskeletal:  Negative for back pain and joint swelling.   Skin:  Negative for rash and wound.   Neurological:  Negative for dizziness, syncope, weakness, light-headedness, numbness and headaches.   Hematological:  Does not bruise/bleed easily.   Psychiatric/Behavioral:  Negative for behavioral problems, hallucinations and suicidal ideas.           Objective     Last Recorded Vitals  /53 (Patient Position: Lying)   Pulse 56   Temp 36.7 °C (98 °F) (Temporal)   Resp 18   Wt 56.1 kg (123 lb 10.9 oz)   SpO2 98%     Image Results  CT head wo IV contrast    Result Date: 11/12/2023  Interpreted By:  Sabrina Ramirez, STUDY: CT HEAD WO IV CONTRAST;  11/12/2023 6:01 pm   INDICATION: Signs/Symptoms:confusion.   COMPARISON: 10/16/2023   ACCESSION NUMBER(S): [id number]   ORDERING CLINICIAN: CASSIDY MEREDITH   TECHNIQUE: Axial noncontrast CT images of the head.   FINDINGS: BRAIN PARENCHYMA: Extensive parenchymal volume loss, nonspecific white matter changes and calcifications of the carotid arteries. No mass effect or midline shift. Focus of encephalomalacia in the right cerebellar hemisphere and extending from the left middle cerebellar peduncle into the left cerebellar hemisphere, similar to prior imaging. Punctate calcifications within the right sulci similar to prior imaging.   HEMORRHAGE: No acute  intracranial hemorrhage. VENTRICLES and EXTRA-AXIAL SPACES: Margin of the lateral and 3rd ventricles are again noted which may be seen with central white matter loss or normal pressure hydrocephalus.. Ex vacuo dilatation of the 4th ventricle likely related to prior infarct. EXTRACRANIAL SOFT TISSUES: Within normal limits. PARANASAL SINUSES/MASTOIDS: The visualized paranasal sinuses and mastoid air cells are aerated. CALVARIUM: No depressed skull fracture. No destructive osseous lesion.   OTHER FINDINGS: None.       No acute intracranial hemorrhage or mass effect.   Chronic white matter changes and sequela of remote posterior fossa infarcts again noted.   Ventricular prominence again noted.   MACRO: None.   Signed by: Sabrina Ramirez 11/12/2023 6:33 PM Dictation workstation:   [id number]    XR chest 1 view    Result Date: 11/12/2023  Interpreted By:  Bhavesh Barros, STUDY: XR CHEST 1 VIEW;  11/12/2023 4:33 pm   INDICATION: Signs/Symptoms:cough, sob.   COMPARISON: 11/07/2023.   ACCESSION NUMBER(S): [id number]   ORDERING CLINICIAN: CASSIDY MEREDITH   FINDINGS: No consolidation. No pleural effusion or pneumothorax. Hyperinflated lungs suggestive of COPD. Right-sided pleural calcifications unchanged. Left pleural calcification also present. Asymmetric elevation of the left hemidiaphragm. Atherosclerosis. Normal heart size. No acute osseous abnormality.       No acute cardiopulmonary abnormality.   Signed by: Bhavesh Barros 11/12/2023 4:45 PM Dictation workstation:   [id number]    CT angio chest for pulmonary embolism    Result Date: 11/7/2023  Interpreted By:  Ramana Ortega, STUDY: CT ANGIO CHEST FOR PULMONARY EMBOLISM; 11/7/2023 1:10 pm   INDICATION: Signs/Symptoms:SOB.   COMPARISON: CT chest dated 10/16/2023.   ACCESSION NUMBER(S): [id number]   ORDERING CLINICIAN: TRISHA LANDON   TECHNIQUE: Contiguous axial CT images were obtained through the chest at 2 mm slice thickness following administration of  intravenous contrast utilizing pulmonary artery bolus tracking. 50 cc of Omnipaque 350 was administered. The images were then reconstructed in the coronal and sagittal planes. MIP images were created and reviewed.   FINDINGS: POTENTIAL LIMITATIONS OF THE STUDY: None   HEART and VESSELS: There is dense opacification of the pulmonary arterial system. No intraluminal filling defect is seen within the pulmonary arteries to suggest acute pulmonary embolus.   The heart is within normal limits for size. No pericardial effusion is identified. Dense atherosclerotic calcifications are seen in the coronary arteries. Mild aortic valve calcifications are present.   Moderate atherosclerotic calcifications are seen in the aortic arch and descending thoracic aorta , including at the origins of the arch vessels. The thoracic aorta is within normal limits for course and caliber, without evidence of aneurysm.   MEDIASTINUM and SHANTA, LOWER NECK AND AXILLA: Evaluation of the visualized neck base demonstrates no gross mass or lymphadenopathy.   There is no gross axillary, hilar or mediastinal lymphadenopathy identified.   LUNGS and AIRWAYS: The trachea and central airways are grossly patent without evidence of endobronchial lesion.   Mild-to-moderate emphysematous changes are seen in the lungs bilaterally. Focal airspace consolidation is seen in the posteromedial aspect of the left mid lung, similar to the prior study, and may represent scarring/chronic atelectasis. Dense pleural calcifications are seen in the right lung. No discrete pulmonary nodules or masses are seen, though evaluation is limited within the regions of airspace consolidation..   UPPER ABDOMEN: Evaluation of the visualized upper abdomen demonstrates no discrete mass or lymphadenopathy.   CHEST WALL and OSSEOUS STRUCTURES: There is no evidence of acute fracture identified. Multilevel degenerative changes are seen in the thoracic spine.       1. No evidence of acute  pulmonary embolus. 2. Left lower lobe airspace consolidation, most consistent with scarring/chronic atelectasis. 3. Emphysematous changes in the lungs bilaterally. 4. Extensive pleural calcifications in the right chest.   MACRO: None.     Signed by: Ramana Ortega 11/7/2023 1:30 PM Dictation workstation:   [id number]    XR chest 2 views    Result Date: 11/7/2023  Interpreted By:  Aretha Skelton, STUDY: XR CHEST 2 VIEWS;  11/7/2023 1:11 pm   INDICATION: Signs/Symptoms:cough.   COMPARISON: 10/16/2023   ACCESSION NUMBER(S): [id number]   ORDERING CLINICIAN: TRISHA LANDON   FINDINGS: Prominent calcified pleural plaques are seen. Emphysematous changes are noted. Elevation of the left hemidiaphragm is again seen, which is unchanged. The heart is not enlarged. No consolidation, pleural effusion pneumothorax is noted.       Chronic lung changes.     MACRO: None   Signed by: Aretha Skelton 11/7/2023 1:16 PM Dictation workstation:   [id number]    CT head wo IV contrast    Result Date: 10/16/2023  Interpreted By:  Topher Hendricks, STUDY: CT HEAD WO IV CONTRAST;  10/16/2023 8:05 pm   INDICATION: Signs/Symptoms:new confusion.   COMPARISON: CT head 06/07/2023   ACCESSION NUMBER(S): [id number]   ORDERING CLINICIAN: FRANKLIN WELCH   TECHNIQUE: Noncontrast axial CT images of head were obtained with coronal and sagittal reconstructed images.   FINDINGS: BRAIN PARENCHYMA: Mild generalized cerebral volume loss. No evidence of acute large territory infarction or transcortical edema. The deep gray structures are preserved. No mass-effect, midline shift or effacement of cerebral sulci. Confluent periventricular and subcortical white matter hypodensities, nonspecific but often seen in the setting of chronic microangiopathic disease.   HEMORRHAGE: No acute intracranial hemorrhage.   VENTRICLES and EXTRA-AXIAL SPACES: Prominence of the lateral and 3rd ventricles greater than expected for degree of cerebral volume loss. No abnormal  extra-axial fluid collection.   ORBITS: The visualized orbits and globes are within normal limits.   EXTRACRANIAL SOFT TISSUES: Within normal limits.   PARANASAL SINUSES/MASTOIDS: The visualized paranasal sinuses and mastoid air cells are well aerated.   CALVARIUM: No depressed skull fracture.   Brain Injury Guidelines (BIG) CT Values:   Skull fracture: No SDH (subdural hematoma): No EDH (epidural hematoma): No IPH (intraparenchymal hemorrhage): No SAH (subarachnoid hemorrhage): No IVH (intraventricular hemorrhage): No   Reference: Carl JENKINS, Judy RS, Everton M, et al. The BIG (brain injury guidelines) project: defining the management of traumatic brain injury by acute care surgeons. J Trauma Acute Care Surg 2014; 76:965-969.       1. No acute intracranial abnormality identified. 2. Ventricular prominence greater than expected for degree of cerebral volume loss, consider normal pressure hydrocephalus or other hydrocephalus. 3. Chronic white matter changes likely related to small-vessel ischemic disease.       MACRO: None   Signed by: Topher Hendricks 10/16/2023 8:45 PM Dictation workstation:   [id number]    CT angio chest for pulmonary embolism    Result Date: 10/16/2023  Interpreted By:  Topher Hendricks, STUDY: CT ANGIO CHEST FOR PULMONARY EMBOLISM;  10/16/2023 8:05 pm   INDICATION: Signs/Symptoms:SOB, elevated dimer.   COMPARISON: CT chest 03/09/2023   ACCESSION NUMBER(S): [id number]   ORDERING CLINICIAN: TOPHER WANG   TECHNIQUE: Contiguous axial images of the chest were obtained after the intravenous administration of 75 mL Omnipaque 350 contrast using angiographic PE protocol. Coronal and sagittal reformatted images were reconstructed from the axial data. MIP images were created on an independent workstation and reviewed.   FINDINGS: PULMONARY ARTERIES: Adequate opacification to the level of the segmental arteries. The subsegmental arteries are suboptimally assessed due to mixing artifact and  respiratory motion. No filling defect to suggest pulmonary embolus in the visualized pulmonary arteries. The main pulmonary artery is normal in diameter. No CT evidence of right heart strain.   HEART: Normal in size. Moderate to heavy triple-vessel coronary vascular calcifications. Calcifications in the plane of the mitral valve. No significant pericardial effusion.   VESSELS: Normal caliber aorta without dissection. Heavy calcifications of the aortic arch and proximal great vessels as well as the descending thoracic aorta.   MEDIASTINUM AND LYMPH NODES: Visualized thyroid is within normal limits. No enlarged intrathoracic or axillary lymph nodes by imaging criteria. No pneumomediastinum. The esophagus appears within normal limits.   LUNG, AIRWAYS, AND PLEURA: Dependent debris within the distal trachea. Left lower lobe bronchial wall thickening with adjacent patchy and consolidative opacities. There is a background of diffuse centrilobular and paraseptal emphysematous change. Bibasilar scarring. Bandlike scarring in the right apex. No pleural effusion or pneumothorax. Scattered bilateral pleural calcifications most pronounced along the pleural surfaces of the right lower lobe. Findings suggest prior asbestos exposure.   OSSEOUS STRUCTURES: No acute osseous abnormality.   CHEST WALL SOFT TISSUES: No discernible abnormality.   UPPER ABDOMEN/OTHER: Heavy vascular calcifications in the upper abdomen.       1. No evidence of pulmonary embolus to the level of the segmental arteries. Filling defects within the subsegmental arteries can not be entirely excluded. 2. Bronchial wall thickening in the medial left lower lobe with adjacent patchy and consolidative opacities. Findings are most suggestive of pneumonia or aspiration pneumonitis. Follow-up is recommended to ensure resolution and exclude underlying lesion. 3. Diffuse background of emphysematous changes and pleural calcifications suggestive of prior asbestos exposure.  4. Please see additional findings and discussion as above.   MACRO: None   Signed by: Topher Hendricks 10/16/2023 8:36 PM Dictation workstation:   [id number]    XR chest 1 view    Result Date: 10/16/2023  Interpreted By:  Paco Andrews, STUDY: XR CHEST 1 VIEW   INDICATION: Signs/Symptoms:hypoxia, confusion.   COMPARISON: June 7, 2023   ACCESSION NUMBER(S): [id number]   ORDERING CLINICIAN: FRANKLIN WELCH   FINDINGS: Extensive pleural-based calcifications in the bilateral lungs right worse than left, similar to prior study.   No new consolidation or edema. No evidence of pneumothorax or effusion.       Extensive calcified pleural plaques suggesting asbestos exposure similar to prior study. No evidence of acute intrathoracic abnormality.   Signed by: Paco Andrews 10/16/2023 5:27 PM Dictation workstation:   [id number]       Lab Results  Results for orders placed or performed during the hospital encounter of 11/12/23 (from the past 24 hour(s))   POCT GLUCOSE   Result Value Ref Range    POCT Glucose 168 (H) 74 - 99 mg/dL   POCT GLUCOSE   Result Value Ref Range    POCT Glucose 114 (H) 74 - 99 mg/dL   Basic Metabolic Panel   Result Value Ref Range    Glucose 144 (H) 74 - 99 mg/dL    Sodium 137 136 - 145 mmol/L    Potassium 4.7 3.5 - 5.3 mmol/L    Chloride 105 98 - 107 mmol/L    Bicarbonate 26 21 - 32 mmol/L    Anion Gap 11 10 - 20 mmol/L    Urea Nitrogen 23 6 - 23 mg/dL    Creatinine 0.83 0.50 - 1.30 mg/dL    eGFR 88 >60 mL/min/1.73m*2    Calcium 8.5 (L) 8.6 - 10.3 mg/dL   CBC   Result Value Ref Range    WBC 11.5 (H) 4.4 - 11.3 x10*3/uL    nRBC 0.0 0.0 - 0.0 /100 WBCs    RBC 3.86 (L) 4.50 - 5.90 x10*6/uL    Hemoglobin 12.0 (L) 13.5 - 17.5 g/dL    Hematocrit 35.4 (L) 41.0 - 52.0 %    MCV 92 80 - 100 fL    MCH 31.1 26.0 - 34.0 pg    MCHC 33.9 32.0 - 36.0 g/dL    RDW 14.1 11.5 - 14.5 %    Platelets 157 150 - 450 x10*3/uL   Magnesium   Result Value Ref Range    Magnesium 1.84 1.60 - 2.40 mg/dL   POCT GLUCOSE   Result Value Ref  Range    POCT Glucose 145 (H) 74 - 99 mg/dL   POCT GLUCOSE   Result Value Ref Range    POCT Glucose 213 (H) 74 - 99 mg/dL        Medications  Scheduled medications:  aspirin, 81 mg, oral, Daily  atorvastatin, 10 mg, oral, Daily  clopidogrel, 75 mg, oral, Daily  doxycycline (Vibramycin) 100 mg in sodium chloride 0.9 % 100 mL IV, 100 mg, intravenous, q12h  tiotropium, 2 Inhalation, inhalation, Daily   And  fluticasone furoate-vilanteroL, 1 puff, inhalation, Daily  guaiFENesin, 1,200 mg, oral, BID  heparin (porcine), 5,000 Units, subcutaneous, q8h  ipratropium-albuteroL, 3 mL, nebulization, TID  lisinopril, 40 mg, oral, Daily  melatonin, 3 mg, oral, Daily  pantoprazole, 40 mg, oral, Daily before breakfast   Or  pantoprazole, 40 mg, intravenous, Daily before breakfast  polyethylene glycol, 17 g, oral, Daily  predniSONE, 40 mg, oral, Daily      Continuous medications:     PRN medications:  PRN medications: acetaminophen, albuterol, bisacodyl, bisacodyl, dextrose 10 % in water (D10W), dextrose, glucagon, ondansetron **OR** ondansetron     Physical Exam  Constitutional:       General: He is not in acute distress.     Appearance: Normal appearance.      Comments: Cachexia   HENT:      Head: Normocephalic and atraumatic.      Right Ear: External ear normal.      Left Ear: External ear normal.      Nose: Nose normal.      Mouth/Throat:      Mouth: Mucous membranes are moist.      Pharynx: Oropharynx is clear.   Eyes:      Extraocular Movements: Extraocular movements intact.      Conjunctiva/sclera: Conjunctivae normal.      Pupils: Pupils are equal, round, and reactive to light.   Cardiovascular:      Rate and Rhythm: Normal rate and regular rhythm.      Pulses: Normal pulses.      Heart sounds: Normal heart sounds.   Pulmonary:      Effort: Pulmonary effort is normal. No respiratory distress.      Breath sounds: Wheezing present. No rhonchi or rales.      Comments: NC in place, on 1L  Abdominal:      General: Abdomen is  flat. Bowel sounds are normal.      Palpations: Abdomen is soft.      Tenderness: There is no abdominal tenderness. There is no right CVA tenderness, left CVA tenderness, guarding or rebound.   Musculoskeletal:         General: No swelling. Normal range of motion.      Cervical back: Normal range of motion and neck supple.   Skin:     General: Skin is warm and dry.      Capillary Refill: Capillary refill takes less than 2 seconds.      Findings: No lesion or rash.   Neurological:      General: No focal deficit present.      Mental Status: He is alert. Mental status is at baseline. He is disoriented.      Comments: Oriented to person, place, situation  States it is 2003, unable to state his age  Knows the President is Reanna  States it is February   Psychiatric:         Mood and Affect: Mood normal.         Behavior: Behavior normal.                  Code Status  Full Code     Assessment/Plan      Acute exacerbation of chronic obstructive pulmonary disease (COPD) (CMS/[facility])  Assessment & Plan  -Pt with wheezing on exam and overall diminished breath sounds  -Imaging and labs otherwise unrevealing   -Continued on home therapies, DuoNebs  -Continued on IV steroids  -Empiric doxycycline   -RT c/s appreciated   -Pulm hygiene     Acute hypoxic respiratory failure (CMS/[facility])  Assessment & Plan  -Treat COPD  -Wean O2 as able  -Home O2 eval prior to discharge     HTN (hypertension)  Assessment & Plan  -Confirm and resume home therapies  -BP slightly elevated during presentation, suspect stress related. Continue to follow.     History of stroke  Assessment & Plan  -Continue DAPT, continue statin   -Continue follow-up as mentioned above     Acute metabolic encephalopathy  Assessment & Plan  -CT head with ventricular prominence as noted during last hospitalization.  Neurology recommended management of any acute issues and to continue dual antiplatelet and statin, have follow-up with neurosurgery in the outpatient setting in the  future.  -UA negative for UTI  -TSH WNL  -No obvious focal or lateralizing deficits, appears patient is back to what appears to be his most recent baseline from his last hospitalization    Cerebral ventriculomegaly  Assessment & Plan  -CT head with ventricular prominence as noted during last hospitalization.  Neurology recommended management of any acute issues and to continue dual antiplatelet and statin, have follow-up with neurosurgery in the outpatient setting in the future.    Prostate cancer (CMS/[facility])  Assessment & Plan  -Follow-up as directed     Hyperlipidemia  Assessment & Plan  -Confirm and resume home therapies             DVT ppx: subcutaneous heparin       Please see orders for more complete plan    Tressa Davila PA-C

## 2023-11-15 NOTE — PROGRESS NOTES
Physical Therapy    Physical Therapy Treatment    Patient Name: Hai Laboy  MRN: [id number]  Today's Date: 11/15/2023  Time Calculation  Start Time: 1116  Stop Time: 1128  Time Calculation (min): 12 min       Assessment/Plan   PT Assessment  PT Assessment Results: Decreased strength, Decreased endurance, Impaired balance, Decreased mobility  Rehab Prognosis: Fair  Evaluation/Treatment Tolerance: Patient limited by fatigue  Medical Staff Made Aware: Yes  End of Session Communication: Bedside nurse, PCT/NA/CTA  Assessment Comment: patient progressed to 8 feeet ofgait. patient requires verbal cues for safety wiht transfers and mobility.  End of Session Patient Position: Up in chair, Alarm on  PT Plan  Inpatient/Swing Bed or Outpatient: Inpatient  PT Plan  Treatment/Interventions: Bed mobility, Transfer training, Gait training, Therapeutic exercise, Therapeutic activity  PT Plan: Skilled PT  PT Frequency: 3 times per week  PT Discharge Recommendations: Moderate intensity level of continued care  Equipment Recommended upon Discharge:  (TBD)  PT Recommended Transfer Status: Assist x2 (FWW)      General Visit Information:   PT  Visit  PT Received On: 11/15/23  General  Prior to Session Communication: Bedside nurse, PCT/NA/TORIBIO  Patient Position Received: Bed, 3 rail up, Alarm on  General Comment: mutiple attempts made before able to convince patient to participate therapy    Subjective   Precautions:  Precautions  Hearing/Visual Limitations: [place]  Medical Precautions: Fall precautions  Vital Signs:       Objective   Pain:  Pain Assessment  Pain Assessment: 0-10  Pain Score: 0 - No pain (no complaints of pain)  Cognition:  Cognition  Orientation Level: Disoriented to time, Disoriented to situation, Disoriented to place  Postural Control:     Extremity/Trunk Assessments:    Activity Tolerance:  Activity Tolerance  Endurance: Tolerates less than 10 min exercise, no significant change in vital signs  Treatments:       Bed  Mobility  Bed Mobility: Yes  Bed Mobility 1  Bed Mobility 1: Supine to sitting  Level of Assistance 1: Minimum assistance    Ambulation/Gait Training  Ambulation/Gait Training Performed: Yes  Ambulation/Gait Training 1  Surface 1: Level tile  Device 1: Rolling walker  Assistance 1: Minimum assistance  Quality of Gait 1: Inconsistent stride length  Comments/Distance (ft) 1: gait training 8 feet forward and back with min assist, seated active rest EOB x 3 mins then gait 4 feet to chair with min assist  Transfers  Transfer: Yes  Transfer 1  Transfer From 1: Sit to  Transfer to 1: Stand (x3 reps)    Outcome Measures:  [facility] Basic Mobility  Turning from your back to your side while in a flat bed without using bedrails: A little  Moving from lying on your back to sitting on the side of a flat bed without using bedrails: A little  Moving to and from bed to chair (including a wheelchair): A little  Standing up from a chair using your arms (e.g. wheelchair or bedside chair): A lot  To walk in hospital room: A lot  Climbing 3-5 steps with railing: A lot  Basic Mobility - Total Score: 15    Education Documentation  Precautions, taught by Marla Delgado PTA at 11/15/2023 11:44 AM.  Learner: Patient  Readiness: Acceptance  Method: Explanation  Response: Verbalizes Understanding    Mobility Training, taught by Marla Delgado PTA at 11/15/2023 11:44 AM.  Learner: Patient  Readiness: Acceptance  Method: Explanation  Response: Verbalizes Understanding    Education Comments  No comments found.        OP EDUCATION:       Encounter Problems       Encounter Problems (Active)       Mobility       STG - Patient will ambulate (Progressing)       Start:  11/13/23    Expected End:  11/17/23       FWW MIN X1 50 FT         STRENGTHENING (Progressing)       Start:  11/13/23    Expected End:  11/17/23       20 REPS AROM/RROM EX INCREASING STRENGTH TO PROGRESS OOB ACTIVITIES            Pain - Adult          Transfers       STG - Patient to  transfer to and from sit to supine (Progressing)       Start:  11/13/23    Expected End:  11/17/23       MIN X1 RAIL HOB FLAT         STG - Patient will transfer sit to and from stand (Progressing)       Start:  11/13/23    Expected End:  11/17/23       FWW CGA USING SAFE TECHNIQUES

## 2023-11-15 NOTE — CARE PLAN
The patient's goals for the shift include      The clinical goals for the shift include remain free from falls and injury    Over the shift, the patient did not make progress toward the following goals. Barriers to progression include n/a. Recommendations to address these barriers include n/a.

## 2023-11-15 NOTE — CARE PLAN
Problem: Skin  Goal: Decreased wound size/increased tissue granulation at next dressing change  11/15/2023 0137 by Keisha Hanson RN  Outcome: Progressing  11/15/2023 0136 by Keisha Hanson RN  Outcome: Progressing  11/15/2023 0135 by Keisha Hanson RN  Outcome: Progressing  11/15/2023 0121 by Keisha Hanson RN  Outcome: Progressing  11/15/2023 0120 by Keisha Hanson RN  Outcome: Progressing  11/14/2023 2254 by Keisha Hanson RN  Outcome: Progressing  11/14/2023 2254 by Keisha Hanson RN  Outcome: Progressing  Goal: Participates in plan/prevention/treatment measures  11/15/2023 0137 by Keisha Hanson RN  Outcome: Progressing  11/15/2023 0136 by Keisha Hanson RN  Outcome: Progressing  11/15/2023 0121 by Keisha Hanson RN  Outcome: Progressing  11/14/2023 2254 by Keisha Hanson RN  Outcome: Progressing  11/14/2023 2254 by Keisha Hanosn RN  Outcome: Progressing  11/14/2023 2253 by Keisha Hanson RN  Outcome: Progressing  Goal: Prevent/manage excess moisture  11/15/2023 0137 by Keisha Hanson RN  Outcome: Progressing  11/15/2023 0136 by Keisha Hanson RN  Outcome: Progressing  11/15/2023 0121 by Keisha Hanson RN  Outcome: Progressing  11/14/2023 2254 by Keisha Hanson RN  Outcome: Progressing  11/14/2023 2254 by Keisha Hanson RN  Outcome: Progressing  Goal: Prevent/minimize sheer/friction injuries  11/15/2023 0137 by Keisha Hanson RN  Outcome: Progressing  11/15/2023 0136 by Keisha Hanson RN  Outcome: Progressing  11/15/2023 0121 by Keisha Hanson RN  Outcome: Progressing  11/14/2023 2254 by Keisha Hanson RN  Outcome: Progressing  11/14/2023 2254 by Keisha Hanson RN  Outcome: Progressing  Goal: Promote/optimize nutrition  11/15/2023 0137 by Keisha Hanson RN  Outcome: Progressing  11/15/2023 0137 by Keisha Hanson RN  Flowsheets (Taken 11/14/2023 1539 by Lidia Sánchez RN)  Promote/optimize nutrition: Consume > 50% meals/supplements  11/15/2023 0136 by Keisha Hanson RN  Outcome: Progressing  11/15/2023 0121  by Keisha Valentin, RN  Outcome: Progressing  11/14/2023 2254 by Keisha Hanson RN  Outcome: Progressing  11/14/2023 2254 by Keisha Hanson RN  Outcome: Progressing  Goal: Promote skin healing  11/15/2023 0137 by Keisha Hanson RN  Outcome: Progressing  11/15/2023 0136 by Keisha Hanson RN  Outcome: Progressing  11/15/2023 0121 by Keisha Hanson RN  Outcome: Progressing  11/14/2023 2254 by Keisha Hanson RN  Outcome: Progressing  11/14/2023 2254 by Keisha Hanson RN  Outcome: Progressing   The patient's goals for the shift include      The clinical goals for the shift include remain free from falls and injury    Over the shift, the patient did not make progress toward the following goals. Barriers to progression include n/a. Recommendations to address these barriers include n/a.

## 2023-11-15 NOTE — CARE PLAN
Problem: Skin  Goal: Participates in plan/prevention/treatment measures  Outcome: Progressing   The patient's goals for the shift include      The clinical goals for the shift include remain free from falls and injury    Over the shift, the patient did not make progress toward the following goals. Barriers to progression include ***. Recommendations to address these barriers include ***.

## 2023-11-15 NOTE — CARE PLAN
Problem: Skin  Goal: Decreased wound size/increased tissue granulation at next dressing change  11/15/2023 0121 by Keisha Hanson RN  Outcome: Progressing  11/15/2023 0120 by Keisha Hanson RN  Outcome: Progressing  11/14/2023 2254 by Keisha Hanson RN  Outcome: Progressing  11/14/2023 2254 by Keisha Hanson RN  Outcome: Progressing  Goal: Participates in plan/prevention/treatment measures  11/15/2023 0121 by Keisha Hanson RN  Outcome: Progressing  11/14/2023 2254 by Keisha Hanson RN  Outcome: Progressing  11/14/2023 2254 by Keisha Hanson RN  Outcome: Progressing  11/14/2023 2253 by Keisha Hanson RN  Outcome: Progressing  Goal: Prevent/manage excess moisture  11/15/2023 0121 by Keisha Hanson RN  Outcome: Progressing  11/14/2023 2254 by Keisha Hanson RN  Outcome: Progressing  11/14/2023 2254 by Keisha Hanson RN  Outcome: Progressing  Goal: Prevent/minimize sheer/friction injuries  11/15/2023 0121 by Keisha Hanson RN  Outcome: Progressing  11/14/2023 2254 by Keisha Hanson RN  Outcome: Progressing  11/14/2023 2254 by Keisha Hanson RN  Outcome: Progressing  Goal: Promote/optimize nutrition  11/15/2023 0121 by Keisha Hanson RN  Outcome: Progressing  11/14/2023 2254 by Keisha Hanson RN  Outcome: Progressing  11/14/2023 2254 by Keisha Hanson RN  Outcome: Progressing  Goal: Promote skin healing  11/15/2023 0121 by Keisha Hanson RN  Outcome: Progressing  11/14/2023 2254 by Keisha Hanson RN  Outcome: Progressing  11/14/2023 2254 by Keisha Hanson RN  Outcome: Progressing   The patient's goals for the shift include      The clinical goals for the shift include remain free from falls and injury    Over the shift, the patient did not make progress toward the following goals. Barriers to progression include n/a. Recommendations to address these barriers include n/a.

## 2023-11-16 ENCOUNTER — PHARMACY VISIT (OUTPATIENT)
Dept: PHARMACY | Facility: CLINIC | Age: 78
End: 2023-11-16
Payer: COMMERCIAL

## 2023-11-16 PROBLEM — R41.82 ALTERED MENTAL STATUS, UNSPECIFIED ALTERED MENTAL STATUS TYPE: Status: RESOLVED | Noted: 2023-11-12 | Resolved: 2023-11-16

## 2023-11-16 PROBLEM — J96.01 ACUTE HYPOXIC RESPIRATORY FAILURE (MULTI): Status: RESOLVED | Noted: 2023-10-16 | Resolved: 2023-11-16

## 2023-11-16 PROBLEM — G93.41 ACUTE METABOLIC ENCEPHALOPATHY: Status: RESOLVED | Noted: 2023-11-12 | Resolved: 2023-11-16

## 2023-11-16 PROBLEM — J44.1 ACUTE EXACERBATION OF CHRONIC OBSTRUCTIVE PULMONARY DISEASE (COPD) (MULTI): Status: RESOLVED | Noted: 2023-11-12 | Resolved: 2023-11-16

## 2023-11-16 LAB
BACTERIA BLD CULT: NORMAL
BACTERIA BLD CULT: NORMAL
GLUCOSE BLD MANUAL STRIP-MCNC: 113 MG/DL (ref 74–99)
GLUCOSE BLD MANUAL STRIP-MCNC: 127 MG/DL (ref 74–99)
GLUCOSE BLD MANUAL STRIP-MCNC: 143 MG/DL (ref 74–99)
GLUCOSE BLD MANUAL STRIP-MCNC: 217 MG/DL (ref 74–99)

## 2023-11-16 PROCEDURE — 82947 ASSAY GLUCOSE BLOOD QUANT: CPT

## 2023-11-16 PROCEDURE — 99233 SBSQ HOSP IP/OBS HIGH 50: CPT | Performed by: PHYSICIAN ASSISTANT

## 2023-11-16 PROCEDURE — 1200000002 HC GENERAL ROOM WITH TELEMETRY DAILY

## 2023-11-16 PROCEDURE — 2500000004 HC RX 250 GENERAL PHARMACY W/ HCPCS (ALT 636 FOR OP/ED): Performed by: STUDENT IN AN ORGANIZED HEALTH CARE EDUCATION/TRAINING PROGRAM

## 2023-11-16 PROCEDURE — 2500000004 HC RX 250 GENERAL PHARMACY W/ HCPCS (ALT 636 FOR OP/ED): Performed by: PHYSICIAN ASSISTANT

## 2023-11-16 PROCEDURE — 96372 THER/PROPH/DIAG INJ SC/IM: CPT | Performed by: STUDENT IN AN ORGANIZED HEALTH CARE EDUCATION/TRAINING PROGRAM

## 2023-11-16 PROCEDURE — 2500000001 HC RX 250 WO HCPCS SELF ADMINISTERED DRUGS (ALT 637 FOR MEDICARE OP): Performed by: STUDENT IN AN ORGANIZED HEALTH CARE EDUCATION/TRAINING PROGRAM

## 2023-11-16 RX ORDER — DOXYCYCLINE 100 MG/1
100 CAPSULE ORAL 2 TIMES DAILY
Qty: 4 CAPSULE | Refills: 0
Start: 2023-11-16 | End: 2023-11-29 | Stop reason: HOSPADM

## 2023-11-16 RX ORDER — PREDNISONE 10 MG/1
TABLET ORAL
Qty: 30 TABLET | Refills: 0 | Status: SHIPPED | OUTPATIENT
Start: 2023-11-16 | End: 2023-11-29 | Stop reason: HOSPADM

## 2023-11-16 RX ORDER — GUAIFENESIN 1200 MG/1
1200 TABLET, EXTENDED RELEASE ORAL 2 TIMES DAILY
Qty: 28 TABLET | Refills: 0
Start: 2023-11-16 | End: 2023-11-30

## 2023-11-16 RX ORDER — IPRATROPIUM BROMIDE AND ALBUTEROL SULFATE 2.5; .5 MG/3ML; MG/3ML
3 SOLUTION RESPIRATORY (INHALATION) EVERY 2 HOUR PRN
Status: DISCONTINUED | OUTPATIENT
Start: 2023-11-16 | End: 2023-11-18 | Stop reason: HOSPADM

## 2023-11-16 RX ADMIN — HEPARIN SODIUM 5000 UNITS: 5000 INJECTION INTRAVENOUS; SUBCUTANEOUS at 14:12

## 2023-11-16 RX ADMIN — Medication 3 MG: at 20:06

## 2023-11-16 RX ADMIN — TIOTROPIUM BROMIDE INHALATION SPRAY 2 PUFF: 3.12 SPRAY, METERED RESPIRATORY (INHALATION) at 06:03

## 2023-11-16 RX ADMIN — ASPIRIN 81 MG: 81 TABLET, COATED ORAL at 08:17

## 2023-11-16 RX ADMIN — HEPARIN SODIUM 5000 UNITS: 5000 INJECTION INTRAVENOUS; SUBCUTANEOUS at 21:34

## 2023-11-16 RX ADMIN — ATORVASTATIN CALCIUM 10 MG: 10 TABLET, FILM COATED ORAL at 20:06

## 2023-11-16 RX ADMIN — GUAIFENESIN 1200 MG: 600 TABLET ORAL at 20:06

## 2023-11-16 RX ADMIN — DOXYCYCLINE 100 MG: 100 INJECTION, POWDER, LYOPHILIZED, FOR SOLUTION INTRAVENOUS at 20:11

## 2023-11-16 RX ADMIN — FLUTICASONE FUROATE AND VILANTEROL TRIFENATATE 1 PUFF: 100; 25 POWDER RESPIRATORY (INHALATION) at 06:03

## 2023-11-16 RX ADMIN — CLOPIDOGREL 75 MG: 75 TABLET ORAL at 08:17

## 2023-11-16 RX ADMIN — LISINOPRIL 40 MG: 20 TABLET ORAL at 08:17

## 2023-11-16 RX ADMIN — PREDNISONE 40 MG: 20 TABLET ORAL at 08:17

## 2023-11-16 RX ADMIN — HEPARIN SODIUM 5000 UNITS: 5000 INJECTION INTRAVENOUS; SUBCUTANEOUS at 06:02

## 2023-11-16 RX ADMIN — PANTOPRAZOLE SODIUM 40 MG: 40 TABLET, DELAYED RELEASE ORAL at 06:02

## 2023-11-16 RX ADMIN — GUAIFENESIN 1200 MG: 600 TABLET ORAL at 08:17

## 2023-11-16 RX ADMIN — DOXYCYCLINE 100 MG: 100 INJECTION, POWDER, LYOPHILIZED, FOR SOLUTION INTRAVENOUS at 08:32

## 2023-11-16 ASSESSMENT — ENCOUNTER SYMPTOMS
BLOOD IN STOOL: 0
FATIGUE: 0
CHILLS: 0
DIARRHEA: 0
SHORTNESS OF BREATH: 0
NUMBNESS: 0
WOUND: 0
FEVER: 0
DYSURIA: 0
COUGH: 1
BACK PAIN: 0
PALPITATIONS: 0
NAUSEA: 0
CHEST TIGHTNESS: 0
DIZZINESS: 0
ABDOMINAL PAIN: 0
DIAPHORESIS: 0
HALLUCINATIONS: 0
EYE PAIN: 0
CONSTIPATION: 0
SORE THROAT: 0
FACIAL SWELLING: 0
VOMITING: 0
LIGHT-HEADEDNESS: 0
BRUISES/BLEEDS EASILY: 0
WHEEZING: 0
WEAKNESS: 0
FREQUENCY: 0
HEADACHES: 0
JOINT SWELLING: 0
HEMATURIA: 0
TROUBLE SWALLOWING: 0
APPETITE CHANGE: 0
FLANK PAIN: 0

## 2023-11-16 ASSESSMENT — COGNITIVE AND FUNCTIONAL STATUS - GENERAL
CLIMB 3 TO 5 STEPS WITH RAILING: TOTAL
TURNING FROM BACK TO SIDE WHILE IN FLAT BAD: A LITTLE
DAILY ACTIVITIY SCORE: 17
MOVING FROM LYING ON BACK TO SITTING ON SIDE OF FLAT BED WITH BEDRAILS: A LITTLE
PERSONAL GROOMING: A LOT
HELP NEEDED FOR BATHING: A LOT
MOVING TO AND FROM BED TO CHAIR: A LITTLE
WALKING IN HOSPITAL ROOM: A LOT
EATING MEALS: A LITTLE
MOVING TO AND FROM BED TO CHAIR: A LITTLE
PERSONAL GROOMING: A LITTLE
EATING MEALS: A LITTLE
TOILETING: A LOT
HELP NEEDED FOR BATHING: A LOT
DRESSING REGULAR UPPER BODY CLOTHING: A LITTLE
WALKING IN HOSPITAL ROOM: A LOT
MOBILITY SCORE: 14
STANDING UP FROM CHAIR USING ARMS: A LOT
MOVING FROM LYING ON BACK TO SITTING ON SIDE OF FLAT BED WITH BEDRAILS: A LITTLE
DAILY ACTIVITIY SCORE: 16
STANDING UP FROM CHAIR USING ARMS: A LOT
CLIMB 3 TO 5 STEPS WITH RAILING: TOTAL
TOILETING: A LOT
DRESSING REGULAR UPPER BODY CLOTHING: A LITTLE
MOBILITY SCORE: 14
TURNING FROM BACK TO SIDE WHILE IN FLAT BAD: A LITTLE

## 2023-11-16 ASSESSMENT — PAIN SCALES - GENERAL: PAINLEVEL_OUTOF10: 0 - NO PAIN

## 2023-11-16 ASSESSMENT — PAIN - FUNCTIONAL ASSESSMENT: PAIN_FUNCTIONAL_ASSESSMENT: 0-10

## 2023-11-16 NOTE — PROGRESS NOTES
Physical Therapy                 Therapy Communication Note    Patient Name: Hai Laboy  MRN: [id number]  Today's Date: 11/16/2023     Discipline: Physical Therapy    Missed Visit Reason: Missed Visit Reason: Patient refused

## 2023-11-16 NOTE — PROGRESS NOTES
Respiratory assessment complete refer to respiratory acuity/triage level=4 .TX changed from TID to Q2PRN .       Anai Roman, RRT

## 2023-11-16 NOTE — PROGRESS NOTES
Occupational Therapy                 Therapy Communication Note    Patient Name: Hai Laboy  MRN: [id number]  Today's Date: 11/16/2023     Discipline: Occupational Therapy    Missed Visit Reason: Missed Visit Reason: Patient refused (pt. just declined PTA stating he doesnt feel like it right now. RN  aware pt. decling therapy mutliple times today.)    Missed Time: Attempt 13:26 pm    Comment:

## 2023-11-16 NOTE — CARE PLAN
Problem: Fall/Injury  Goal: Not fall by end of shift  Outcome: Progressing  Goal: Be free from injury by end of the shift  Outcome: Progressing  Goal: Verbalize understanding of personal risk factors for fall in the hospital  Outcome: Progressing  Goal: Verbalize understanding of risk factor reduction measures to prevent injury from fall in the home  Outcome: Progressing  Goal: Use assistive devices by end of the shift  Outcome: Progressing  Goal: Pace activities to prevent fatigue by end of the shift  Outcome: Progressing     Problem: Respiratory  Goal: Clear secretions with interventions this shift  Outcome: Progressing  Goal: Minimal/no exertional discomfort or dyspnea this shift  Outcome: Progressing  Goal: Patent airway maintained this shift  Outcome: Progressing  Goal: Wean oxygen to maintain O2 saturation per order/standard this shift  Outcome: Progressing     Problem: Pain - Adult  Goal: Verbalizes/displays adequate comfort level or baseline comfort level  Outcome: Progressing     Problem: Safety - Adult  Goal: Free from fall injury  Outcome: Progressing     Problem: Discharge Planning  Goal: Discharge to home or other facility with appropriate resources  Outcome: Progressing     Problem: Chronic Conditions and Co-morbidities  Goal: Patient's chronic conditions and co-morbidity symptoms are monitored and maintained or improved  Outcome: Progressing     Problem: Skin  Goal: Decreased wound size/increased tissue granulation at next dressing change  11/15/2023 1403 by Esther Cardenas RN  Outcome: Progressing  Flowsheets (Taken 11/15/2023 1403)  Decreased wound size/increased tissue granulation at next dressing change: Promote sleep for wound healing  11/15/2023 0758 by Esther Cardenas RN  Outcome: Progressing  Goal: Participates in plan/prevention/treatment measures  11/15/2023 1403 by Esther Cardenas RN  Flowsheets (Taken 11/15/2023 1403)  Participates in plan/prevention/treatment measures:  Discuss with provider PT/OT consult  11/15/2023 0758 by Esther Cardenas RN  Outcome: Progressing  Goal: Prevent/manage excess moisture  11/15/2023 1403 by Esther Cardenas RN  Flowsheets (Taken 11/15/2023 1403)  Prevent/manage excess moisture:   Cleanse incontinence/protect with barrier cream   Moisturize dry skin  11/15/2023 0758 by Esther Cardenas RN  Outcome: Progressing  Goal: Prevent/minimize sheer/friction injuries  11/15/2023 1403 by Esther Cardenas RN  Flowsheets (Taken 11/15/2023 1403)  Prevent/minimize sheer/friction injuries:   Increase activity/out of bed for meals   Turn/reposition every 2 hours/use positioning/transfer devices  11/15/2023 0758 by Esther Cardenas RN  Outcome: Progressing  Goal: Promote/optimize nutrition  11/15/2023 1403 by Esther Cardenas RN  Flowsheets (Taken 11/14/2023 1539 by Lidia Sánchez RN)  Promote/optimize nutrition: Consume > 50% meals/supplements  11/15/2023 0758 by Esther Cardenas RN  Outcome: Progressing  Goal: Promote skin healing  11/15/2023 1403 by Esther Cardenas RN  Flowsheets (Taken 11/15/2023 1403)  Promote skin healing: Turn/reposition every 2 hours/use positioning/transfer devices  11/15/2023 0758 by Esther Cardenas RN  Outcome: Progressing     Problem: Diabetes  Goal: Achieve decreasing blood glucose levels by end of shift  Outcome: Progressing  Goal: Increase stability of blood glucose readings by end of shift  Outcome: Progressing  Goal: Decrease in ketones present in urine by end of shift  Outcome: Progressing  Goal: Maintain electrolyte levels within acceptable range throughout shift  Outcome: Progressing  Goal: Maintain glucose levels >70mg/dl to <250mg/dl throughout shift  Outcome: Progressing  Goal: No changes in neurological exam by end of shift  Outcome: Progressing  Goal: Learn about and adhere to nutrition recommendations by end of shift  Outcome: Progressing  Goal: Vital signs within normal range  for age by end of shift  Outcome: Progressing  Goal: Increase self care and/or family involovement by end of shift  Outcome: Progressing  Goal: Receive DSME education by end of shift  Outcome: Progressing

## 2023-11-16 NOTE — PROGRESS NOTES
Physical Therapy                 Therapy Communication Note    Patient Name: Hai Laboy  MRN: [id number]  Today's Date: 11/16/2023     Discipline: Physical Therapy    Missed Visit Reason: Missed Visit Reason: Patient refused (pt states he doesnt feel like it right now.)

## 2023-11-16 NOTE — PROGRESS NOTES
"Hai Laboy is a 78 y.o. male on day 4 of admission presenting with Altered mental status, unspecified altered mental status type.      Subjective   Hai Laboy is a 78 y.o. male with PMHx s/f HTN, HLD, COPD (no baseline O2), prior stroke, prostate cancer, and recent admission to [facility] for AMS/PNA/hypoxia, presented 11/12/23 with change in mental status and shortness of breath. Patient is oriented to self, can tell me he is in a hospital in [place], and can report the president as Reanna; however, he reports the year is [id number] and it is February and becomes quickly frustrated with questions, stating \"I don't know, why are you asking me?\". Patient presents via EMS for a reported \"slide\" out of his chair at home while attempting to get his inhaler while feeling short of breath. Pt would not elaborate for me on the fall, but did admit he has been feeling increasingly SOB over the last few days, but especially today. He is reportedly living at home but has a friend that helps check on him. He is following commands appropriately but does easily get frustrated. He feels SOB has improved since presentation and believes he does need oxygen at home or he will come back time and time again. Initial VBG with a pH of 7.32, PCO2 59, PO2 28, calculated bicarb 30.4.  Repeat with a pH of 7.40, PCO2 47, PO2 55, calculated bicarb 29.1. Lactate downtrending from 3.1-2.1. CXR without acute process.  CT head without IV contrast negative for acute process, is notable for chronic changes and ventricular prominence which was noted during last hospitalization. TSH WNL. UA without UTI.       SW confirmed friend Angel Sky is POA    11/16/23: No acute events overnight. Vitals stable, on RA since 11/13/23. Slight hyperglycemia.  Awaiting SNF authorization         Review of Systems   Constitutional:  Negative for appetite change, chills, diaphoresis, fatigue and fever.   HENT:  Negative for congestion, ear pain, facial swelling, hearing " loss, nosebleeds, sore throat, tinnitus and trouble swallowing.    Eyes:  Negative for pain.   Respiratory:  Positive for cough. Negative for chest tightness, shortness of breath (On exertion) and wheezing.    Cardiovascular:  Negative for chest pain, palpitations and leg swelling.   Gastrointestinal:  Negative for abdominal pain, blood in stool, constipation, diarrhea, nausea and vomiting.   Genitourinary:  Negative for dysuria, flank pain, frequency, hematuria and urgency.   Musculoskeletal:  Negative for back pain and joint swelling.   Skin:  Negative for rash and wound.   Neurological:  Negative for dizziness, syncope, weakness, light-headedness, numbness and headaches.   Hematological:  Does not bruise/bleed easily.   Psychiatric/Behavioral:  Negative for behavioral problems, hallucinations and suicidal ideas.           Objective     Last Recorded Vitals  /67   Pulse 52   Temp 36.9 °C (98.4 °F)   Resp 18   Wt 56.1 kg (123 lb 10.9 oz)   SpO2 98%     Image Results  CT head wo IV contrast    Result Date: 11/12/2023  Interpreted By:  Sabrina Ramirez, STUDY: CT HEAD WO IV CONTRAST;  11/12/2023 6:01 pm   INDICATION: Signs/Symptoms:confusion.   COMPARISON: 10/16/2023   ACCESSION NUMBER(S): [id number]   ORDERING CLINICIAN: CASSIDY MEREDITH   TECHNIQUE: Axial noncontrast CT images of the head.   FINDINGS: BRAIN PARENCHYMA: Extensive parenchymal volume loss, nonspecific white matter changes and calcifications of the carotid arteries. No mass effect or midline shift. Focus of encephalomalacia in the right cerebellar hemisphere and extending from the left middle cerebellar peduncle into the left cerebellar hemisphere, similar to prior imaging. Punctate calcifications within the right sulci similar to prior imaging.   HEMORRHAGE: No acute intracranial hemorrhage. VENTRICLES and EXTRA-AXIAL SPACES: Margin of the lateral and 3rd ventricles are again noted which may be seen with central white matter loss or normal  pressure hydrocephalus.. Ex vacuo dilatation of the 4th ventricle likely related to prior infarct. EXTRACRANIAL SOFT TISSUES: Within normal limits. PARANASAL SINUSES/MASTOIDS: The visualized paranasal sinuses and mastoid air cells are aerated. CALVARIUM: No depressed skull fracture. No destructive osseous lesion.   OTHER FINDINGS: None.       No acute intracranial hemorrhage or mass effect.   Chronic white matter changes and sequela of remote posterior fossa infarcts again noted.   Ventricular prominence again noted.   MACRO: None.   Signed by: Sabrina Ramirez 11/12/2023 6:33 PM Dictation workstation:   [id number]    XR chest 1 view    Result Date: 11/12/2023  Interpreted By:  Bhavesh Barros, STUDY: XR CHEST 1 VIEW;  11/12/2023 4:33 pm   INDICATION: Signs/Symptoms:cough, sob.   COMPARISON: 11/07/2023.   ACCESSION NUMBER(S): [id number]   ORDERING CLINICIAN: CASSIDY MEREDITH   FINDINGS: No consolidation. No pleural effusion or pneumothorax. Hyperinflated lungs suggestive of COPD. Right-sided pleural calcifications unchanged. Left pleural calcification also present. Asymmetric elevation of the left hemidiaphragm. Atherosclerosis. Normal heart size. No acute osseous abnormality.       No acute cardiopulmonary abnormality.   Signed by: Bhavesh Barros 11/12/2023 4:45 PM Dictation workstation:   [id number]    CT angio chest for pulmonary embolism    Result Date: 11/7/2023  Interpreted By:  Ramana Ortega, STUDY: CT ANGIO CHEST FOR PULMONARY EMBOLISM; 11/7/2023 1:10 pm   INDICATION: Signs/Symptoms:SOB.   COMPARISON: CT chest dated 10/16/2023.   ACCESSION NUMBER(S): [id number]   ORDERING CLINICIAN: TRISHA LANDON   TECHNIQUE: Contiguous axial CT images were obtained through the chest at 2 mm slice thickness following administration of intravenous contrast utilizing pulmonary artery bolus tracking. 50 cc of Omnipaque 350 was administered. The images were then reconstructed in the coronal and sagittal planes. MIP  images were created and reviewed.   FINDINGS: POTENTIAL LIMITATIONS OF THE STUDY: None   HEART and VESSELS: There is dense opacification of the pulmonary arterial system. No intraluminal filling defect is seen within the pulmonary arteries to suggest acute pulmonary embolus.   The heart is within normal limits for size. No pericardial effusion is identified. Dense atherosclerotic calcifications are seen in the coronary arteries. Mild aortic valve calcifications are present.   Moderate atherosclerotic calcifications are seen in the aortic arch and descending thoracic aorta , including at the origins of the arch vessels. The thoracic aorta is within normal limits for course and caliber, without evidence of aneurysm.   MEDIASTINUM and SHANTA, LOWER NECK AND AXILLA: Evaluation of the visualized neck base demonstrates no gross mass or lymphadenopathy.   There is no gross axillary, hilar or mediastinal lymphadenopathy identified.   LUNGS and AIRWAYS: The trachea and central airways are grossly patent without evidence of endobronchial lesion.   Mild-to-moderate emphysematous changes are seen in the lungs bilaterally. Focal airspace consolidation is seen in the posteromedial aspect of the left mid lung, similar to the prior study, and may represent scarring/chronic atelectasis. Dense pleural calcifications are seen in the right lung. No discrete pulmonary nodules or masses are seen, though evaluation is limited within the regions of airspace consolidation..   UPPER ABDOMEN: Evaluation of the visualized upper abdomen demonstrates no discrete mass or lymphadenopathy.   CHEST WALL and OSSEOUS STRUCTURES: There is no evidence of acute fracture identified. Multilevel degenerative changes are seen in the thoracic spine.       1. No evidence of acute pulmonary embolus. 2. Left lower lobe airspace consolidation, most consistent with scarring/chronic atelectasis. 3. Emphysematous changes in the lungs bilaterally. 4. Extensive pleural  calcifications in the right chest.   MACRO: None.     Signed by: Ramana Ortega 11/7/2023 1:30 PM Dictation workstation:   [id number]    XR chest 2 views    Result Date: 11/7/2023  Interpreted By:  Aretha Skelton, STUDY: XR CHEST 2 VIEWS;  11/7/2023 1:11 pm   INDICATION: Signs/Symptoms:cough.   COMPARISON: 10/16/2023   ACCESSION NUMBER(S): [id number]   ORDERING CLINICIAN: TRISHA LANDON   FINDINGS: Prominent calcified pleural plaques are seen. Emphysematous changes are noted. Elevation of the left hemidiaphragm is again seen, which is unchanged. The heart is not enlarged. No consolidation, pleural effusion pneumothorax is noted.       Chronic lung changes.     MACRO: None   Signed by: Aretha Skelton 11/7/2023 1:16 PM Dictation workstation:   [id number]    CT head wo IV contrast    Result Date: 10/16/2023  Interpreted By:  Topher Hendricks, STUDY: CT HEAD WO IV CONTRAST;  10/16/2023 8:05 pm   INDICATION: Signs/Symptoms:new confusion.   COMPARISON: CT head 06/07/2023   ACCESSION NUMBER(S): [id number]   ORDERING CLINICIAN: FRANKLIN WELCH   TECHNIQUE: Noncontrast axial CT images of head were obtained with coronal and sagittal reconstructed images.   FINDINGS: BRAIN PARENCHYMA: Mild generalized cerebral volume loss. No evidence of acute large territory infarction or transcortical edema. The deep gray structures are preserved. No mass-effect, midline shift or effacement of cerebral sulci. Confluent periventricular and subcortical white matter hypodensities, nonspecific but often seen in the setting of chronic microangiopathic disease.   HEMORRHAGE: No acute intracranial hemorrhage.   VENTRICLES and EXTRA-AXIAL SPACES: Prominence of the lateral and 3rd ventricles greater than expected for degree of cerebral volume loss. No abnormal extra-axial fluid collection.   ORBITS: The visualized orbits and globes are within normal limits.   EXTRACRANIAL SOFT TISSUES: Within normal limits.   PARANASAL SINUSES/MASTOIDS: The  visualized paranasal sinuses and mastoid air cells are well aerated.   CALVARIUM: No depressed skull fracture.   Brain Injury Guidelines (BIG) CT Values:   Skull fracture: No SDH (subdural hematoma): No EDH (epidural hematoma): No IPH (intraparenchymal hemorrhage): No SAH (subarachnoid hemorrhage): No IVH (intraventricular hemorrhage): No   Reference: Carl JENKINS, Judy RS, Everton KNAPP, et al. The BIG (brain injury guidelines) project: defining the management of traumatic brain injury by acute care surgeons. J Trauma Acute Care Surg 2014; 76:965-969.       1. No acute intracranial abnormality identified. 2. Ventricular prominence greater than expected for degree of cerebral volume loss, consider normal pressure hydrocephalus or other hydrocephalus. 3. Chronic white matter changes likely related to small-vessel ischemic disease.       MACRO: None   Signed by: Topher Hendricks 10/16/2023 8:45 PM Dictation workstation:   [id number]    CT angio chest for pulmonary embolism    Result Date: 10/16/2023  Interpreted By:  Topher Hendricks, STUDY: CT ANGIO CHEST FOR PULMONARY EMBOLISM;  10/16/2023 8:05 pm   INDICATION: Signs/Symptoms:SOB, elevated dimer.   COMPARISON: CT chest 03/09/2023   ACCESSION NUMBER(S): [id number]   ORDERING CLINICIAN: TOPHER WANG   TECHNIQUE: Contiguous axial images of the chest were obtained after the intravenous administration of 75 mL Omnipaque 350 contrast using angiographic PE protocol. Coronal and sagittal reformatted images were reconstructed from the axial data. MIP images were created on an independent workstation and reviewed.   FINDINGS: PULMONARY ARTERIES: Adequate opacification to the level of the segmental arteries. The subsegmental arteries are suboptimally assessed due to mixing artifact and respiratory motion. No filling defect to suggest pulmonary embolus in the visualized pulmonary arteries. The main pulmonary artery is normal in diameter. No CT evidence of right heart strain.    HEART: Normal in size. Moderate to heavy triple-vessel coronary vascular calcifications. Calcifications in the plane of the mitral valve. No significant pericardial effusion.   VESSELS: Normal caliber aorta without dissection. Heavy calcifications of the aortic arch and proximal great vessels as well as the descending thoracic aorta.   MEDIASTINUM AND LYMPH NODES: Visualized thyroid is within normal limits. No enlarged intrathoracic or axillary lymph nodes by imaging criteria. No pneumomediastinum. The esophagus appears within normal limits.   LUNG, AIRWAYS, AND PLEURA: Dependent debris within the distal trachea. Left lower lobe bronchial wall thickening with adjacent patchy and consolidative opacities. There is a background of diffuse centrilobular and paraseptal emphysematous change. Bibasilar scarring. Bandlike scarring in the right apex. No pleural effusion or pneumothorax. Scattered bilateral pleural calcifications most pronounced along the pleural surfaces of the right lower lobe. Findings suggest prior asbestos exposure.   OSSEOUS STRUCTURES: No acute osseous abnormality.   CHEST WALL SOFT TISSUES: No discernible abnormality.   UPPER ABDOMEN/OTHER: Heavy vascular calcifications in the upper abdomen.       1. No evidence of pulmonary embolus to the level of the segmental arteries. Filling defects within the subsegmental arteries can not be entirely excluded. 2. Bronchial wall thickening in the medial left lower lobe with adjacent patchy and consolidative opacities. Findings are most suggestive of pneumonia or aspiration pneumonitis. Follow-up is recommended to ensure resolution and exclude underlying lesion. 3. Diffuse background of emphysematous changes and pleural calcifications suggestive of prior asbestos exposure. 4. Please see additional findings and discussion as above.   MACRO: None   Signed by: Topher Hendricks 10/16/2023 8:36 PM Dictation workstation:   [id number]    XR chest 1 view    Result Date:  10/16/2023  Interpreted By:  Paco Andrews, STUDY: XR CHEST 1 VIEW   INDICATION: Signs/Symptoms:hypoxia, confusion.   COMPARISON: June 7, 2023   ACCESSION NUMBER(S): [id number]   ORDERING CLINICIAN: FRANKLIN WELCH   FINDINGS: Extensive pleural-based calcifications in the bilateral lungs right worse than left, similar to prior study.   No new consolidation or edema. No evidence of pneumothorax or effusion.       Extensive calcified pleural plaques suggesting asbestos exposure similar to prior study. No evidence of acute intrathoracic abnormality.   Signed by: Paco Andrews 10/16/2023 5:27 PM Dictation workstation:   [id number]       Lab Results  Results for orders placed or performed during the hospital encounter of 11/12/23 (from the past 24 hour(s))   POCT GLUCOSE   Result Value Ref Range    POCT Glucose 163 (H) 74 - 99 mg/dL   POCT GLUCOSE   Result Value Ref Range    POCT Glucose 177 (H) 74 - 99 mg/dL   POCT GLUCOSE   Result Value Ref Range    POCT Glucose 127 (H) 74 - 99 mg/dL   POCT GLUCOSE   Result Value Ref Range    POCT Glucose 113 (H) 74 - 99 mg/dL        Medications  Scheduled medications:  aspirin, 81 mg, oral, Daily  atorvastatin, 10 mg, oral, Daily  clopidogrel, 75 mg, oral, Daily  doxycycline (Vibramycin) 100 mg in sodium chloride 0.9 % 100 mL IV, 100 mg, intravenous, q12h  tiotropium, 2 Inhalation, inhalation, Daily   And  fluticasone furoate-vilanteroL, 1 puff, inhalation, Daily  guaiFENesin, 1,200 mg, oral, BID  heparin (porcine), 5,000 Units, subcutaneous, q8h  lisinopril, 40 mg, oral, Daily  melatonin, 3 mg, oral, Daily  pantoprazole, 40 mg, oral, Daily before breakfast   Or  pantoprazole, 40 mg, intravenous, Daily before breakfast  polyethylene glycol, 17 g, oral, Daily  predniSONE, 40 mg, oral, Daily      Continuous medications:     PRN medications:  PRN medications: acetaminophen, albuterol, bisacodyl, bisacodyl, dextrose 10 % in water (D10W), dextrose, glucagon, ipratropium-albuteroL,  ondansetron **OR** ondansetron     Physical Exam  Constitutional:       General: He is not in acute distress.     Appearance: Normal appearance.      Comments: Cachexia   HENT:      Head: Normocephalic and atraumatic.      Right Ear: External ear normal.      Left Ear: External ear normal.      Nose: Nose normal.      Mouth/Throat:      Mouth: Mucous membranes are moist.      Pharynx: Oropharynx is clear.   Eyes:      Extraocular Movements: Extraocular movements intact.      Conjunctiva/sclera: Conjunctivae normal.      Pupils: Pupils are equal, round, and reactive to light.   Cardiovascular:      Rate and Rhythm: Normal rate and regular rhythm.      Pulses: Normal pulses.      Heart sounds: Normal heart sounds.   Pulmonary:      Effort: Pulmonary effort is normal. No respiratory distress.      Breath sounds: No wheezing, rhonchi or rales.   Abdominal:      General: Abdomen is flat. Bowel sounds are normal.      Palpations: Abdomen is soft.      Tenderness: There is no abdominal tenderness. There is no right CVA tenderness, left CVA tenderness, guarding or rebound.   Musculoskeletal:         General: No swelling. Normal range of motion.      Cervical back: Normal range of motion and neck supple.   Skin:     General: Skin is warm and dry.      Capillary Refill: Capillary refill takes less than 2 seconds.      Findings: No lesion or rash.   Neurological:      General: No focal deficit present.      Mental Status: He is alert. Mental status is at baseline. He is disoriented.      Comments: Oriented to person, place, situation  States it is 2003, unable to state his age  Knows the President is Reanna  States it is February   Psychiatric:         Mood and Affect: Mood normal.         Behavior: Behavior normal.                  Code Status  Full Code     Assessment/Plan      HTN (hypertension)  Assessment & Plan  -Confirm and resume home therapies  -BP slightly elevated during presentation, suspect stress related. Continue  to follow.     History of stroke  Assessment & Plan  -Continue DAPT, continue statin   -Continue follow-up as mentioned above     Cerebral ventriculomegaly  Assessment & Plan  -CT head with ventricular prominence as noted during last hospitalization.  Neurology recommended management of any acute issues and to continue dual antiplatelet and statin, have follow-up with neurosurgery in the outpatient setting in the future.    Prostate cancer (CMS/HCC)  Assessment & Plan  -Follow-up as directed     Hyperlipidemia  Assessment & Plan  -Confirm and resume home therapies             DVT ppx: subcutaneous heparin       Please see orders for more complete plan    Tressa Davila PA-C

## 2023-11-17 LAB
GLUCOSE BLD MANUAL STRIP-MCNC: 114 MG/DL (ref 74–99)
GLUCOSE BLD MANUAL STRIP-MCNC: 203 MG/DL (ref 74–99)
GLUCOSE BLD MANUAL STRIP-MCNC: 270 MG/DL (ref 74–99)
GLUCOSE BLD MANUAL STRIP-MCNC: 82 MG/DL (ref 74–99)

## 2023-11-17 PROCEDURE — 2500000004 HC RX 250 GENERAL PHARMACY W/ HCPCS (ALT 636 FOR OP/ED): Performed by: STUDENT IN AN ORGANIZED HEALTH CARE EDUCATION/TRAINING PROGRAM

## 2023-11-17 PROCEDURE — 99239 HOSP IP/OBS DSCHRG MGMT >30: CPT | Performed by: PHYSICIAN ASSISTANT

## 2023-11-17 PROCEDURE — 82947 ASSAY GLUCOSE BLOOD QUANT: CPT

## 2023-11-17 PROCEDURE — 96372 THER/PROPH/DIAG INJ SC/IM: CPT | Performed by: STUDENT IN AN ORGANIZED HEALTH CARE EDUCATION/TRAINING PROGRAM

## 2023-11-17 PROCEDURE — 2500000001 HC RX 250 WO HCPCS SELF ADMINISTERED DRUGS (ALT 637 FOR MEDICARE OP): Performed by: STUDENT IN AN ORGANIZED HEALTH CARE EDUCATION/TRAINING PROGRAM

## 2023-11-17 PROCEDURE — 2500000004 HC RX 250 GENERAL PHARMACY W/ HCPCS (ALT 636 FOR OP/ED): Performed by: PHYSICIAN ASSISTANT

## 2023-11-17 PROCEDURE — 1200000002 HC GENERAL ROOM WITH TELEMETRY DAILY

## 2023-11-17 RX ADMIN — Medication 3 MG: at 20:49

## 2023-11-17 RX ADMIN — HEPARIN SODIUM 5000 UNITS: 5000 INJECTION INTRAVENOUS; SUBCUTANEOUS at 14:40

## 2023-11-17 RX ADMIN — GUAIFENESIN 1200 MG: 600 TABLET ORAL at 10:39

## 2023-11-17 RX ADMIN — GUAIFENESIN 1200 MG: 600 TABLET ORAL at 20:49

## 2023-11-17 RX ADMIN — HEPARIN SODIUM 5000 UNITS: 5000 INJECTION INTRAVENOUS; SUBCUTANEOUS at 21:00

## 2023-11-17 RX ADMIN — TIOTROPIUM BROMIDE INHALATION SPRAY 2 PUFF: 3.12 SPRAY, METERED RESPIRATORY (INHALATION) at 05:24

## 2023-11-17 RX ADMIN — PANTOPRAZOLE SODIUM 40 MG: 40 TABLET, DELAYED RELEASE ORAL at 05:25

## 2023-11-17 RX ADMIN — ATORVASTATIN CALCIUM 10 MG: 10 TABLET, FILM COATED ORAL at 20:49

## 2023-11-17 RX ADMIN — HEPARIN SODIUM 5000 UNITS: 5000 INJECTION INTRAVENOUS; SUBCUTANEOUS at 05:24

## 2023-11-17 RX ADMIN — CLOPIDOGREL 75 MG: 75 TABLET ORAL at 10:29

## 2023-11-17 RX ADMIN — DOXYCYCLINE 100 MG: 100 INJECTION, POWDER, LYOPHILIZED, FOR SOLUTION INTRAVENOUS at 10:39

## 2023-11-17 RX ADMIN — FLUTICASONE FUROATE AND VILANTEROL TRIFENATATE 1 PUFF: 100; 25 POWDER RESPIRATORY (INHALATION) at 05:24

## 2023-11-17 RX ADMIN — ASPIRIN 81 MG: 81 TABLET, COATED ORAL at 09:00

## 2023-11-17 RX ADMIN — PREDNISONE 40 MG: 20 TABLET ORAL at 10:29

## 2023-11-17 RX ADMIN — POLYETHYLENE GLYCOL 3350 17 G: 17 POWDER, FOR SOLUTION ORAL at 10:36

## 2023-11-17 RX ADMIN — LISINOPRIL 40 MG: 20 TABLET ORAL at 10:30

## 2023-11-17 ASSESSMENT — COGNITIVE AND FUNCTIONAL STATUS - GENERAL
TOILETING: A LOT
TOILETING: A LOT
WALKING IN HOSPITAL ROOM: A LOT
DAILY ACTIVITIY SCORE: 15
DRESSING REGULAR UPPER BODY CLOTHING: A LITTLE
CLIMB 3 TO 5 STEPS WITH RAILING: A LOT
STANDING UP FROM CHAIR USING ARMS: A LITTLE
STANDING UP FROM CHAIR USING ARMS: A LITTLE
WALKING IN HOSPITAL ROOM: A LOT
MOBILITY SCORE: 19
DRESSING REGULAR UPPER BODY CLOTHING: A LITTLE
DRESSING REGULAR LOWER BODY CLOTHING: A LOT
CLIMB 3 TO 5 STEPS WITH RAILING: A LOT
MOBILITY SCORE: 19
PERSONAL GROOMING: A LOT
DAILY ACTIVITIY SCORE: 15
HELP NEEDED FOR BATHING: A LOT
DRESSING REGULAR LOWER BODY CLOTHING: A LOT
HELP NEEDED FOR BATHING: A LOT
PERSONAL GROOMING: A LOT

## 2023-11-17 ASSESSMENT — ENCOUNTER SYMPTOMS
COUGH: 1
SORE THROAT: 0
FLANK PAIN: 0
WHEEZING: 0
NAUSEA: 0
APPETITE CHANGE: 0
ABDOMINAL PAIN: 0
LIGHT-HEADEDNESS: 0
BRUISES/BLEEDS EASILY: 0
VOMITING: 0
DIZZINESS: 0
HALLUCINATIONS: 0
HEMATURIA: 0
DYSURIA: 0
FREQUENCY: 0
WEAKNESS: 0
PALPITATIONS: 0
JOINT SWELLING: 0
DIAPHORESIS: 0
NUMBNESS: 0
CHILLS: 0
SHORTNESS OF BREATH: 0
FATIGUE: 0
EYE PAIN: 0
WOUND: 0
FEVER: 0
TROUBLE SWALLOWING: 0
BACK PAIN: 0
CHEST TIGHTNESS: 0
CONSTIPATION: 0
BLOOD IN STOOL: 0
DIARRHEA: 0
HEADACHES: 0
FACIAL SWELLING: 0

## 2023-11-17 ASSESSMENT — PAIN SCALES - GENERAL
PAINLEVEL_OUTOF10: 0 - NO PAIN

## 2023-11-17 ASSESSMENT — PAIN - FUNCTIONAL ASSESSMENT: PAIN_FUNCTIONAL_ASSESSMENT: 0-10

## 2023-11-17 NOTE — PROGRESS NOTES
Occupational Therapy                 Therapy Communication Note    Patient Name: Hai Laboy  MRN: [id number]  Today's Date: 11/17/2023     Discipline: Occupational Therapy    Missed Visit Reason: Missed Visit Reason: Patient refused    Missed Time: Attempt    Comment: pt. Declining despite encouragement to participate.    Protopic Counseling: Patient may experience a mild burning sensation during topical application. Protopic is not approved in children less than 2 years of age. There have been case reports of hematologic and skin malignancies in patients using topical calcineurin inhibitors although causality is questionable.

## 2023-11-17 NOTE — PROGRESS NOTES
"Physical Therapy                 Therapy Communication Note    Patient Name: Hai Laboy  MRN: [id number]  Today's Date: 11/17/2023     Discipline: Physical Therapy    Missed Visit Reason: Patient refused    Missed Time: Attempt    Comment: pt declining therapy, despite attempted persuasion, stating \"I just don't want to do it today\"  "

## 2023-11-17 NOTE — DISCHARGE SUMMARY
"Admission Date: 11/12/2023  3:21 PM  Discharge Date: 11/17/23   Condition at discharge: Fair    Discharge Diagnosis  Altered mental status, suspect acute metabolic encephalopathy in setting of hypoxic respiratory failure and COPD exacerbation  Acute hypoxic respiratory failure   Acute COPD exacerbation  HTN  HLD  H/o stroke  H/o prostate cancer  Ventriculomegaly    Test Results Pending At Discharge  Pending Labs       No current pending labs.            Hospital Course   Hai Laboy is a 78 y.o. male with PMHx s/f HTN, HLD, COPD (no baseline O2), prior stroke, prostate cancer, and recent admission to [facility] for AMS/PNA/hypoxia, presented 11/12/23 with change in mental status and shortness of breath. Patient is oriented to self, can tell me he is in a hospital in [place], and can report the president as Reanna; however, he reports the year is [id number] and it is February and becomes quickly frustrated with questions, stating \"I don't know, why are you asking me?\". Patient presents via EMS for a reported \"slide\" out of his chair at home while attempting to get his inhaler while feeling short of breath. Pt would not elaborate for me on the fall, but did admit he has been feeling increasingly SOB over the last few days, but especially today. He is reportedly living at home but has a friend that helps check on him. He is following commands appropriately but does easily get frustrated. He feels SOB has improved since presentation and believes he does need oxygen at home or he will come back time and time again. Initial VBG with a pH of 7.32, PCO2 59, PO2 28, calculated bicarb 30.4.  Repeat with a pH of 7.40, PCO2 47, PO2 55, calculated bicarb 29.1. Lactate downtrending from 3.1-2.1. CXR without acute process.  CT head without IV contrast negative for acute process, is notable for chronic changes and ventricular prominence which was noted during last hospitalization. TSH WNL. UA without UTI. Patient weaned to RA, " wheezing has stopped. Patient discharged to SNF.     Consultations: None    Pertinent Physical Exam At Time of Discharge  Constitutional:       General: He is not in acute distress.     Appearance: Normal appearance.      Comments: Cachexia   HENT:      Head: Normocephalic and atraumatic.      Right Ear: External ear normal.      Left Ear: External ear normal.      Nose: Nose normal.      Mouth/Throat:      Mouth: Mucous membranes are moist.      Pharynx: Oropharynx is clear.   Eyes:      Extraocular Movements: Extraocular movements intact.      Conjunctiva/sclera: Conjunctivae normal.      Pupils: Pupils are equal, round, and reactive to light.   Cardiovascular:      Rate and Rhythm: Normal rate and regular rhythm.      Pulses: Normal pulses.      Heart sounds: Normal heart sounds.   Pulmonary:      Effort: Pulmonary effort is normal. No respiratory distress.      Breath sounds: NO wheezing present. No rhonchi or rales.   Abdominal:      General: Abdomen is flat. Bowel sounds are normal.      Palpations: Abdomen is soft.      Tenderness: There is no abdominal tenderness. There is no right CVA tenderness, left CVA tenderness, guarding or rebound.   Musculoskeletal:         General: No swelling. Normal range of motion.      Cervical back: Normal range of motion and neck supple.   Skin:     General: Skin is warm and dry.      Capillary Refill: Capillary refill takes less than 2 seconds.      Findings: No lesion or rash.   Neurological:      General: No focal deficit present.      Mental Status: He is alert. Mental status is at baseline. He is disoriented.      Comments: Oriented to person, place, situation  States it is 2005, unable to state his age  Knows the President is Reanna  States it is February   Psychiatric:         Mood and Affect: Mood normal.         Behavior: Behavior normal.        Code Status  Full Code     Home Medications     Medication List      START taking these medications     doxycycline 100 mg  capsule; Commonly known as: Vibramycin; Take 1   capsule (100 mg) by mouth 2 times a day for 2 days. Take with at least 8   ounces (large glass) of water, do not lie down for 30 minutes after   guaiFENesin 1,200 mg tablet extended release 12hr; Commonly known as:   Mucinex; Take 1 tablet (1,200 mg) by mouth 2 times a day for 14 days. Do   not crush, chew, or split.     CHANGE how you take these medications     predniSONE 10 mg tablet; Commonly known as: Deltasone; Take 4 tablets   (40 mg) by mouth once daily for 3 days, THEN 3 tablets (30 mg) once daily   for 3 days, THEN 2 tablets (20 mg) once daily for 3 days, THEN 1 tablet   (10 mg) once daily for 3 days.; Start taking on: November 16, 2023; What   changed: medication strength, See the new instructions.     CONTINUE taking these medications     * albuterol 90 mcg/actuation inhaler   * albuterol 2.5 mg /3 mL (0.083 %) nebulizer solution   aspirin 81 mg EC tablet   atorvastatin 10 mg tablet; Commonly known as: Lipitor   clopidogrel 75 mg tablet; Commonly known as: Plavix; TAKE 1 TABLET BY   MOUTH ONCE DAILY.   Gemtesa 75 mg tablet; Generic drug: vibegron; Take 1 tablet (75 mg) by   mouth once daily.   lisinopril 40 mg tablet   multivitamin tablet   polyethylene glycol 17 gram packet; Commonly known as: Glycolax,   Miralax; Take 17 g by mouth once daily. Do not start before October 21, 2023.   Trelegy Ellipta 100-62.5-25 mcg blister with device; Generic drug:   fluticasone-umeclidin-vilanter  * This list has 2 medication(s) that are the same as other medications   prescribed for you. Read the directions carefully, and ask your doctor or   other care provider to review them with you.       Outpatient Follow-Up  Future Appointments   Date Time Provider Department Center   12/8/2023 11:00 AM Damian Belle MD [id number]         At the time of discharge, patient's pain was controlled with oral analgesia, patient was urinating, having BMs, sleeping, and  eating well. Follow up recommendations are in discharge paperwork. Discharge plan was discussed with the patient/family and all of the questions were answered. Medications were ordered to be delivered to bedside prior to discharge.     Discharge planning took greater than 35 minutes    Diagnoses at time of discharge:  Altered mental status, suspect acute metabolic encephalopathy in setting of hypoxic respiratory failure and COPD exacerbation  Acute hypoxic respiratory failure   Acute COPD exacerbation  HTN  HLD  H/o stroke  H/o prostate cancer  Ventriculomegaly    Anticipated discharge destination: skilled nursing facility at [facility]    Please see orders for more complete plan    Tressa Davila PA-C

## 2023-11-17 NOTE — PROGRESS NOTES
"Hai Laboy is a 78 y.o. male on day 5 of admission presenting with Altered mental status, unspecified altered mental status type.      Subjective   Hai Laboy is a 78 y.o. male with PMHx s/f HTN, HLD, COPD (no baseline O2), prior stroke, prostate cancer, and recent admission to [place] for AMS/PNA/hypoxia, presented 11/12/23 with change in mental status and shortness of breath. Patient is oriented to self, can tell me he is in a hospital in [place], and can report the president as Reanna; however, he reports the year is [id number] and it is February and becomes quickly frustrated with questions, stating \"I don't know, why are you asking me?\". Patient presents via EMS for a reported \"slide\" out of his chair at home while attempting to get his inhaler while feeling short of breath. Pt would not elaborate for me on the fall, but did admit he has been feeling increasingly SOB over the last few days, but especially today. He is reportedly living at home but has a friend that helps check on him. He is following commands appropriately but does easily get frustrated. He feels SOB has improved since presentation and believes he does need oxygen at home or he will come back time and time again. Initial VBG with a pH of 7.32, PCO2 59, PO2 28, calculated bicarb 30.4.  Repeat with a pH of 7.40, PCO2 47, PO2 55, calculated bicarb 29.1. Lactate downtrending from 3.1-2.1. CXR without acute process.  CT head without IV contrast negative for acute process, is notable for chronic changes and ventricular prominence which was noted during last hospitalization. TSH WNL. UA without UTI.       SW confirmed friend Angel Sky is POA    11/17/23: No acute events overnight. Vitals stable, on RA since 11/13/23. Awaiting SNF authorization         Review of Systems   Constitutional:  Negative for appetite change, chills, diaphoresis, fatigue and fever.   HENT:  Negative for congestion, ear pain, facial swelling, hearing loss, nosebleeds, sore " throat, tinnitus and trouble swallowing.    Eyes:  Negative for pain.   Respiratory:  Positive for cough. Negative for chest tightness, shortness of breath (On exertion) and wheezing.    Cardiovascular:  Negative for chest pain, palpitations and leg swelling.   Gastrointestinal:  Negative for abdominal pain, blood in stool, constipation, diarrhea, nausea and vomiting.   Genitourinary:  Negative for dysuria, flank pain, frequency, hematuria and urgency.   Musculoskeletal:  Negative for back pain and joint swelling.   Skin:  Negative for rash and wound.   Neurological:  Negative for dizziness, syncope, weakness, light-headedness, numbness and headaches.   Hematological:  Does not bruise/bleed easily.   Psychiatric/Behavioral:  Negative for behavioral problems, hallucinations and suicidal ideas.           Objective     Last Recorded Vitals  /56 (BP Location: Left arm, Patient Position: Lying)   Pulse 52   Temp 36.8 °C (98.3 °F) (Temporal)   Resp 16   Wt 56.1 kg (123 lb 10.9 oz)   SpO2 99%     Image Results  CT head wo IV contrast    Result Date: 11/12/2023  Interpreted By:  Sabrina Ramirez, STUDY: CT HEAD WO IV CONTRAST;  11/12/2023 6:01 pm   INDICATION: Signs/Symptoms:confusion.   COMPARISON: 10/16/2023   ACCESSION NUMBER(S): [id number]   ORDERING CLINICIAN: CASSIDY MEREDITH   TECHNIQUE: Axial noncontrast CT images of the head.   FINDINGS: BRAIN PARENCHYMA: Extensive parenchymal volume loss, nonspecific white matter changes and calcifications of the carotid arteries. No mass effect or midline shift. Focus of encephalomalacia in the right cerebellar hemisphere and extending from the left middle cerebellar peduncle into the left cerebellar hemisphere, similar to prior imaging. Punctate calcifications within the right sulci similar to prior imaging.   HEMORRHAGE: No acute intracranial hemorrhage. VENTRICLES and EXTRA-AXIAL SPACES: Margin of the lateral and 3rd ventricles are again noted which may be seen with  central white matter loss or normal pressure hydrocephalus.. Ex vacuo dilatation of the 4th ventricle likely related to prior infarct. EXTRACRANIAL SOFT TISSUES: Within normal limits. PARANASAL SINUSES/MASTOIDS: The visualized paranasal sinuses and mastoid air cells are aerated. CALVARIUM: No depressed skull fracture. No destructive osseous lesion.   OTHER FINDINGS: None.       No acute intracranial hemorrhage or mass effect.   Chronic white matter changes and sequela of remote posterior fossa infarcts again noted.   Ventricular prominence again noted.   MACRO: None.   Signed by: Sabrina Ramirez 11/12/2023 6:33 PM Dictation workstation:   [id number]    XR chest 1 view    Result Date: 11/12/2023  Interpreted By:  Bhavesh Barros, STUDY: XR CHEST 1 VIEW;  11/12/2023 4:33 pm   INDICATION: Signs/Symptoms:cough, sob.   COMPARISON: 11/07/2023.   ACCESSION NUMBER(S): [id number]   ORDERING CLINICIAN: CASSIDY MEREDITH   FINDINGS: No consolidation. No pleural effusion or pneumothorax. Hyperinflated lungs suggestive of COPD. Right-sided pleural calcifications unchanged. Left pleural calcification also present. Asymmetric elevation of the left hemidiaphragm. Atherosclerosis. Normal heart size. No acute osseous abnormality.       No acute cardiopulmonary abnormality.   Signed by: Bhavesh Barros 11/12/2023 4:45 PM Dictation workstation:   [id number]    CT angio chest for pulmonary embolism    Result Date: 11/7/2023  Interpreted By:  Ramana Ortega, STUDY: CT ANGIO CHEST FOR PULMONARY EMBOLISM; 11/7/2023 1:10 pm   INDICATION: Signs/Symptoms:SOB.   COMPARISON: CT chest dated 10/16/2023.   ACCESSION NUMBER(S): [id number]   ORDERING CLINICIAN: TRISHA LANDON   TECHNIQUE: Contiguous axial CT images were obtained through the chest at 2 mm slice thickness following administration of intravenous contrast utilizing pulmonary artery bolus tracking. 50 cc of Omnipaque 350 was administered. The images were then reconstructed in the  coronal and sagittal planes. MIP images were created and reviewed.   FINDINGS: POTENTIAL LIMITATIONS OF THE STUDY: None   HEART and VESSELS: There is dense opacification of the pulmonary arterial system. No intraluminal filling defect is seen within the pulmonary arteries to suggest acute pulmonary embolus.   The heart is within normal limits for size. No pericardial effusion is identified. Dense atherosclerotic calcifications are seen in the coronary arteries. Mild aortic valve calcifications are present.   Moderate atherosclerotic calcifications are seen in the aortic arch and descending thoracic aorta , including at the origins of the arch vessels. The thoracic aorta is within normal limits for course and caliber, without evidence of aneurysm.   MEDIASTINUM and SHANTA, LOWER NECK AND AXILLA: Evaluation of the visualized neck base demonstrates no gross mass or lymphadenopathy.   There is no gross axillary, hilar or mediastinal lymphadenopathy identified.   LUNGS and AIRWAYS: The trachea and central airways are grossly patent without evidence of endobronchial lesion.   Mild-to-moderate emphysematous changes are seen in the lungs bilaterally. Focal airspace consolidation is seen in the posteromedial aspect of the left mid lung, similar to the prior study, and may represent scarring/chronic atelectasis. Dense pleural calcifications are seen in the right lung. No discrete pulmonary nodules or masses are seen, though evaluation is limited within the regions of airspace consolidation..   UPPER ABDOMEN: Evaluation of the visualized upper abdomen demonstrates no discrete mass or lymphadenopathy.   CHEST WALL and OSSEOUS STRUCTURES: There is no evidence of acute fracture identified. Multilevel degenerative changes are seen in the thoracic spine.       1. No evidence of acute pulmonary embolus. 2. Left lower lobe airspace consolidation, most consistent with scarring/chronic atelectasis. 3. Emphysematous changes in the lungs  bilaterally. 4. Extensive pleural calcifications in the right chest.   MACRO: None.     Signed by: Ramana Ortega 11/7/2023 1:30 PM Dictation workstation:   [id number]    XR chest 2 views    Result Date: 11/7/2023  Interpreted By:  Aretha Skelton, STUDY: XR CHEST 2 VIEWS;  11/7/2023 1:11 pm   INDICATION: Signs/Symptoms:cough.   COMPARISON: 10/16/2023   ACCESSION NUMBER(S): [id number]   ORDERING CLINICIAN: TRISHA LANDON   FINDINGS: Prominent calcified pleural plaques are seen. Emphysematous changes are noted. Elevation of the left hemidiaphragm is again seen, which is unchanged. The heart is not enlarged. No consolidation, pleural effusion pneumothorax is noted.       Chronic lung changes.     MACRO: None   Signed by: Aretha Skelton 11/7/2023 1:16 PM Dictation workstation:   [id number]    CT head wo IV contrast    Result Date: 10/16/2023  Interpreted By:  Topher Hendricks, STUDY: CT HEAD WO IV CONTRAST;  10/16/2023 8:05 pm   INDICATION: Signs/Symptoms:new confusion.   COMPARISON: CT head 06/07/2023   ACCESSION NUMBER(S): [id number]   ORDERING CLINICIAN: FRANKLIN WELCH   TECHNIQUE: Noncontrast axial CT images of head were obtained with coronal and sagittal reconstructed images.   FINDINGS: BRAIN PARENCHYMA: Mild generalized cerebral volume loss. No evidence of acute large territory infarction or transcortical edema. The deep gray structures are preserved. No mass-effect, midline shift or effacement of cerebral sulci. Confluent periventricular and subcortical white matter hypodensities, nonspecific but often seen in the setting of chronic microangiopathic disease.   HEMORRHAGE: No acute intracranial hemorrhage.   VENTRICLES and EXTRA-AXIAL SPACES: Prominence of the lateral and 3rd ventricles greater than expected for degree of cerebral volume loss. No abnormal extra-axial fluid collection.   ORBITS: The visualized orbits and globes are within normal limits.   EXTRACRANIAL SOFT TISSUES: Within normal limits.    PARANASAL SINUSES/MASTOIDS: The visualized paranasal sinuses and mastoid air cells are well aerated.   CALVARIUM: No depressed skull fracture.   Brain Injury Guidelines (BIG) CT Values:   Skull fracture: No SDH (subdural hematoma): No EDH (epidural hematoma): No IPH (intraparenchymal hemorrhage): No SAH (subarachnoid hemorrhage): No IVH (intraventricular hemorrhage): No   Reference: Carl JENKINS, Judy RS, Everton M, et al. The BIG (brain injury guidelines) project: defining the management of traumatic brain injury by acute care surgeons. J Trauma Acute Care Surg 2014; 76:965-969.       1. No acute intracranial abnormality identified. 2. Ventricular prominence greater than expected for degree of cerebral volume loss, consider normal pressure hydrocephalus or other hydrocephalus. 3. Chronic white matter changes likely related to small-vessel ischemic disease.       MACRO: None   Signed by: Topher Hendricks 10/16/2023 8:45 PM Dictation workstation:   [id number]    CT angio chest for pulmonary embolism    Result Date: 10/16/2023  Interpreted By:  Topher Hendricks, STUDY: CT ANGIO CHEST FOR PULMONARY EMBOLISM;  10/16/2023 8:05 pm   INDICATION: Signs/Symptoms:SOB, elevated dimer.   COMPARISON: CT chest 03/09/2023   ACCESSION NUMBER(S): [id number]   ORDERING CLINICIAN: TOPHER WANG   TECHNIQUE: Contiguous axial images of the chest were obtained after the intravenous administration of 75 mL Omnipaque 350 contrast using angiographic PE protocol. Coronal and sagittal reformatted images were reconstructed from the axial data. MIP images were created on an independent workstation and reviewed.   FINDINGS: PULMONARY ARTERIES: Adequate opacification to the level of the segmental arteries. The subsegmental arteries are suboptimally assessed due to mixing artifact and respiratory motion. No filling defect to suggest pulmonary embolus in the visualized pulmonary arteries. The main pulmonary artery is normal in diameter. No CT  evidence of right heart strain.   HEART: Normal in size. Moderate to heavy triple-vessel coronary vascular calcifications. Calcifications in the plane of the mitral valve. No significant pericardial effusion.   VESSELS: Normal caliber aorta without dissection. Heavy calcifications of the aortic arch and proximal great vessels as well as the descending thoracic aorta.   MEDIASTINUM AND LYMPH NODES: Visualized thyroid is within normal limits. No enlarged intrathoracic or axillary lymph nodes by imaging criteria. No pneumomediastinum. The esophagus appears within normal limits.   LUNG, AIRWAYS, AND PLEURA: Dependent debris within the distal trachea. Left lower lobe bronchial wall thickening with adjacent patchy and consolidative opacities. There is a background of diffuse centrilobular and paraseptal emphysematous change. Bibasilar scarring. Bandlike scarring in the right apex. No pleural effusion or pneumothorax. Scattered bilateral pleural calcifications most pronounced along the pleural surfaces of the right lower lobe. Findings suggest prior asbestos exposure.   OSSEOUS STRUCTURES: No acute osseous abnormality.   CHEST WALL SOFT TISSUES: No discernible abnormality.   UPPER ABDOMEN/OTHER: Heavy vascular calcifications in the upper abdomen.       1. No evidence of pulmonary embolus to the level of the segmental arteries. Filling defects within the subsegmental arteries can not be entirely excluded. 2. Bronchial wall thickening in the medial left lower lobe with adjacent patchy and consolidative opacities. Findings are most suggestive of pneumonia or aspiration pneumonitis. Follow-up is recommended to ensure resolution and exclude underlying lesion. 3. Diffuse background of emphysematous changes and pleural calcifications suggestive of prior asbestos exposure. 4. Please see additional findings and discussion as above.   MACRO: None   Signed by: Topher Hendricks 10/16/2023 8:36 PM Dictation workstation:    [id number]    XR chest 1 view    Result Date: 10/16/2023  Interpreted By:  Paco Andrews, STUDY: XR CHEST 1 VIEW   INDICATION: Signs/Symptoms:hypoxia, confusion.   COMPARISON: June 7, 2023   ACCESSION NUMBER(S): [id number]   ORDERING CLINICIAN: FRANKLIN WELCH   FINDINGS: Extensive pleural-based calcifications in the bilateral lungs right worse than left, similar to prior study.   No new consolidation or edema. No evidence of pneumothorax or effusion.       Extensive calcified pleural plaques suggesting asbestos exposure similar to prior study. No evidence of acute intrathoracic abnormality.   Signed by: Paco Andrews 10/16/2023 5:27 PM Dictation workstation:   [id number]       Lab Results  Results for orders placed or performed during the hospital encounter of 11/12/23 (from the past 24 hour(s))   POCT GLUCOSE   Result Value Ref Range    POCT Glucose 113 (H) 74 - 99 mg/dL   POCT GLUCOSE   Result Value Ref Range    POCT Glucose 143 (H) 74 - 99 mg/dL   POCT GLUCOSE   Result Value Ref Range    POCT Glucose 217 (H) 74 - 99 mg/dL   POCT GLUCOSE   Result Value Ref Range    POCT Glucose 82 74 - 99 mg/dL        Medications  Scheduled medications:  aspirin, 81 mg, oral, Daily  atorvastatin, 10 mg, oral, Daily  clopidogrel, 75 mg, oral, Daily  tiotropium, 2 Inhalation, inhalation, Daily   And  fluticasone furoate-vilanteroL, 1 puff, inhalation, Daily  guaiFENesin, 1,200 mg, oral, BID  heparin (porcine), 5,000 Units, subcutaneous, q8h  lisinopril, 40 mg, oral, Daily  melatonin, 3 mg, oral, Daily  pantoprazole, 40 mg, oral, Daily before breakfast   Or  pantoprazole, 40 mg, intravenous, Daily before breakfast  polyethylene glycol, 17 g, oral, Daily  predniSONE, 40 mg, oral, Daily      Continuous medications:     PRN medications:  PRN medications: acetaminophen, albuterol, bisacodyl, bisacodyl, dextrose 10 % in water (D10W), dextrose, glucagon, ipratropium-albuteroL, ondansetron **OR** ondansetron     Physical  Exam  Constitutional:       General: He is not in acute distress.     Appearance: Normal appearance.      Comments: Cachexia   HENT:      Head: Normocephalic and atraumatic.      Right Ear: External ear normal.      Left Ear: External ear normal.      Nose: Nose normal.      Mouth/Throat:      Mouth: Mucous membranes are moist.      Pharynx: Oropharynx is clear.   Eyes:      Extraocular Movements: Extraocular movements intact.      Conjunctiva/sclera: Conjunctivae normal.      Pupils: Pupils are equal, round, and reactive to light.   Cardiovascular:      Rate and Rhythm: Normal rate and regular rhythm.      Pulses: Normal pulses.      Heart sounds: Normal heart sounds.   Pulmonary:      Effort: Pulmonary effort is normal. No respiratory distress.      Breath sounds: No wheezing, rhonchi or rales.   Abdominal:      General: Abdomen is flat. Bowel sounds are normal.      Palpations: Abdomen is soft.      Tenderness: There is no abdominal tenderness. There is no right CVA tenderness, left CVA tenderness, guarding or rebound.   Musculoskeletal:         General: No swelling. Normal range of motion.      Cervical back: Normal range of motion and neck supple.   Skin:     General: Skin is warm and dry.      Capillary Refill: Capillary refill takes less than 2 seconds.      Findings: No lesion or rash.   Neurological:      General: No focal deficit present.      Mental Status: He is alert. Mental status is at baseline. He is disoriented.      Comments: Oriented to person, place, situation  States it is 2003, unable to state his age  Knows the President is Biden  States it is February   Psychiatric:         Mood and Affect: Mood normal.         Behavior: Behavior normal.                  Code Status  Full Code     Assessment/Plan      Acute exacerbation of chronic obstructive pulmonary disease (COPD) (CMS/[facility])  Assessment & Plan  -Continued on home therapies, DuoNebs  -Completed doxycycline  -RT c/s appreciated   -Pulm  hygiene       Resolved, started on oral prednisone 11/15- taper on dc    Acute hypoxic respiratory failure (CMS/Formerly [facility])  Assessment & Plan  -Treat COPD  -Wean O2 as able  -Home O2 eval prior to discharge     HTN (hypertension)  Assessment & Plan  -Confirm and resume home therapies  -BP slightly elevated during presentation, suspect stress related. Continue to follow.     History of stroke  Assessment & Plan  -Continue DAPT, continue statin   -Continue follow-up as mentioned above     Acute metabolic encephalopathy  Assessment & Plan  -CT head with ventricular prominence as noted during last hospitalization.  Neurology recommended management of any acute issues and to continue dual antiplatelet and statin, have follow-up with neurosurgery in the outpatient setting in the future.  -UA negative for UTI  -TSH WNL  -No obvious focal or lateralizing deficits, appears patient is back to what appears to be his most recent baseline from his last hospitalization    Cerebral ventriculomegaly  Assessment & Plan  -CT head with ventricular prominence as noted during last hospitalization.  Neurology recommended management of any acute issues and to continue dual antiplatelet and statin, have follow-up with neurosurgery in the outpatient setting in the future.    Prostate cancer (CMS/Formerly [facility])  Assessment & Plan  -Follow-up as directed     Hyperlipidemia  Assessment & Plan  -Confirm and resume home therapies          DVT ppx: subcutaneous heparin       Please see orders for more complete plan    Tressa Davila PA-C

## 2023-11-18 VITALS
TEMPERATURE: 97.4 F | HEIGHT: 67 IN | HEART RATE: 50 BPM | OXYGEN SATURATION: 98 % | WEIGHT: 123.68 LBS | RESPIRATION RATE: 18 BRPM | SYSTOLIC BLOOD PRESSURE: 151 MMHG | BODY MASS INDEX: 19.41 KG/M2 | DIASTOLIC BLOOD PRESSURE: 56 MMHG

## 2023-11-18 LAB
GLUCOSE BLD MANUAL STRIP-MCNC: 126 MG/DL (ref 74–99)
GLUCOSE BLD MANUAL STRIP-MCNC: 165 MG/DL (ref 74–99)

## 2023-11-18 PROCEDURE — 82947 ASSAY GLUCOSE BLOOD QUANT: CPT

## 2023-11-18 PROCEDURE — 2500000004 HC RX 250 GENERAL PHARMACY W/ HCPCS (ALT 636 FOR OP/ED): Performed by: STUDENT IN AN ORGANIZED HEALTH CARE EDUCATION/TRAINING PROGRAM

## 2023-11-18 PROCEDURE — 96372 THER/PROPH/DIAG INJ SC/IM: CPT | Performed by: STUDENT IN AN ORGANIZED HEALTH CARE EDUCATION/TRAINING PROGRAM

## 2023-11-18 PROCEDURE — 2500000004 HC RX 250 GENERAL PHARMACY W/ HCPCS (ALT 636 FOR OP/ED): Performed by: PHYSICIAN ASSISTANT

## 2023-11-18 PROCEDURE — 2500000001 HC RX 250 WO HCPCS SELF ADMINISTERED DRUGS (ALT 637 FOR MEDICARE OP): Performed by: STUDENT IN AN ORGANIZED HEALTH CARE EDUCATION/TRAINING PROGRAM

## 2023-11-18 RX ADMIN — HEPARIN SODIUM 5000 UNITS: 5000 INJECTION INTRAVENOUS; SUBCUTANEOUS at 05:59

## 2023-11-18 RX ADMIN — CLOPIDOGREL 75 MG: 75 TABLET ORAL at 09:23

## 2023-11-18 RX ADMIN — FLUTICASONE FUROATE AND VILANTEROL TRIFENATATE 1 PUFF: 100; 25 POWDER RESPIRATORY (INHALATION) at 05:59

## 2023-11-18 RX ADMIN — POLYETHYLENE GLYCOL 3350 17 G: 17 POWDER, FOR SOLUTION ORAL at 09:23

## 2023-11-18 RX ADMIN — PREDNISONE 40 MG: 20 TABLET ORAL at 09:23

## 2023-11-18 RX ADMIN — LISINOPRIL 40 MG: 20 TABLET ORAL at 09:23

## 2023-11-18 RX ADMIN — ASPIRIN 81 MG: 81 TABLET, COATED ORAL at 09:23

## 2023-11-18 RX ADMIN — GUAIFENESIN 1200 MG: 600 TABLET ORAL at 09:23

## 2023-11-18 RX ADMIN — PANTOPRAZOLE SODIUM 40 MG: 40 TABLET, DELAYED RELEASE ORAL at 05:59

## 2023-11-18 RX ADMIN — TIOTROPIUM BROMIDE INHALATION SPRAY 2 PUFF: 3.12 SPRAY, METERED RESPIRATORY (INHALATION) at 05:59

## 2023-11-18 ASSESSMENT — COGNITIVE AND FUNCTIONAL STATUS - GENERAL
HELP NEEDED FOR BATHING: A LOT
TOILETING: A LOT
STANDING UP FROM CHAIR USING ARMS: A LITTLE
CLIMB 3 TO 5 STEPS WITH RAILING: A LOT
DAILY ACTIVITIY SCORE: 14
DRESSING REGULAR LOWER BODY CLOTHING: A LOT
EATING MEALS: A LITTLE
MOBILITY SCORE: 18
PERSONAL GROOMING: A LOT
WALKING IN HOSPITAL ROOM: A LOT
DRESSING REGULAR UPPER BODY CLOTHING: A LITTLE
MOVING TO AND FROM BED TO CHAIR: A LITTLE

## 2023-11-18 ASSESSMENT — PAIN SCALES - GENERAL: PAINLEVEL_OUTOF10: 0 - NO PAIN

## 2023-11-18 NOTE — SIGNIFICANT EVENT
The patient was discharged prior to my rounds today.  Discharge summary was performed yesterday  by our hospitalist service patient was deemed fit for discharge to skilled nursing facility.  No provisional report from nursing concerning any overnight events internal medicine/ hospitalist needs today.  Appears that patient was discharged in stable condition.

## 2023-11-18 NOTE — PROGRESS NOTES
Call placed to POA to alert of discharge to [facility] at 11:30 AM this morning. He is aware and will be coming up from [place] tomorrow to see patient at the facility. Address and phone number given to POA.

## 2023-11-18 NOTE — CARE PLAN
The patient's goals for the shift include      The clinical goals for the shift include pt will remain fall free throughout shift      Problem: Fall/Injury  Goal: Not fall by end of shift  Outcome: Progressing  Goal: Be free from injury by end of the shift  Outcome: Progressing  Goal: Verbalize understanding of personal risk factors for fall in the hospital  Outcome: Progressing  Goal: Verbalize understanding of risk factor reduction measures to prevent injury from fall in the home  Outcome: Progressing  Goal: Use assistive devices by end of the shift  Outcome: Progressing  Goal: Pace activities to prevent fatigue by end of the shift  Outcome: Progressing     Problem: Respiratory  Goal: Clear secretions with interventions this shift  Outcome: Progressing  Goal: Minimal/no exertional discomfort or dyspnea this shift  Outcome: Progressing  Goal: Patent airway maintained this shift  Outcome: Progressing  Goal: Wean oxygen to maintain O2 saturation per order/standard this shift  Outcome: Progressing     Problem: Pain - Adult  Goal: Verbalizes/displays adequate comfort level or baseline comfort level  Outcome: Progressing     Problem: Safety - Adult  Goal: Free from fall injury  Outcome: Progressing     Problem: Discharge Planning  Goal: Discharge to home or other facility with appropriate resources  Outcome: Progressing     Problem: Chronic Conditions and Co-morbidities  Goal: Patient's chronic conditions and co-morbidity symptoms are monitored and maintained or improved  Outcome: Progressing     Problem: Skin  Goal: Decreased wound size/increased tissue granulation at next dressing change  Outcome: Progressing  Flowsheets (Taken 11/18/2023 1030)  Decreased wound size/increased tissue granulation at next dressing change:   Promote sleep for wound healing   Utilize specialty bed per algorithm   Protective dressings over bony prominences  Goal: Participates in plan/prevention/treatment measures  Outcome:  Progressing  Flowsheets (Taken 11/18/2023 1030)  Participates in plan/prevention/treatment measures:   Discuss with provider PT/OT consult   Elevate heels   Increase activity/out of bed for meals  Goal: Prevent/manage excess moisture  Outcome: Progressing  Flowsheets (Taken 11/18/2023 1030)  Prevent/manage excess moisture:   Moisturize dry skin   Cleanse incontinence/protect with barrier cream  Goal: Prevent/minimize sheer/friction injuries  Outcome: Progressing  Flowsheets (Taken 11/18/2023 1030)  Prevent/minimize sheer/friction injuries:   Use pull sheet   Increase activity/out of bed for meals   HOB 30 degrees or less  Goal: Promote/optimize nutrition  Outcome: Progressing  Flowsheets (Taken 11/18/2023 1030)  Promote/optimize nutrition:   Assist with feeding   Consume > 50% meals/supplements   Monitor/record intake including meals   Offer water/supplements/favorite foods  Goal: Promote skin healing  Outcome: Progressing  Flowsheets (Taken 11/18/2023 1030)  Promote skin healing:   Assess skin/pad under line(s)/device(s)   Protective dressings over bony prominences   Turn/reposition every 2 hours/use positioning/transfer devices     Problem: Diabetes  Goal: Achieve decreasing blood glucose levels by end of shift  Outcome: Progressing  Goal: Increase stability of blood glucose readings by end of shift  Outcome: Progressing  Goal: Decrease in ketones present in urine by end of shift  Outcome: Progressing  Goal: Maintain electrolyte levels within acceptable range throughout shift  Outcome: Progressing  Goal: Maintain glucose levels >70mg/dl to <250mg/dl throughout shift  Outcome: Progressing  Goal: No changes in neurological exam by end of shift  Outcome: Progressing  Goal: Learn about and adhere to nutrition recommendations by end of shift  Outcome: Progressing  Goal: Vital signs within normal range for age by end of shift  Outcome: Progressing  Goal: Increase self care and/or family involovement by end of  shift  Outcome: Progressing  Goal: Receive DSME education by end of shift  Outcome: Progressing

## 2023-11-20 ENCOUNTER — LAB REQUISITION (OUTPATIENT)
Dept: LAB | Facility: HOSPITAL | Age: 78
End: 2023-11-20
Payer: MEDICARE

## 2023-11-20 DIAGNOSIS — J44.9 CHRONIC OBSTRUCTIVE PULMONARY DISEASE, UNSPECIFIED (MULTI): ICD-10-CM

## 2023-11-20 LAB
ANION GAP SERPL CALC-SCNC: 9 MMOL/L (ref 10–20)
BUN SERPL-MCNC: 29 MG/DL (ref 6–23)
CALCIUM SERPL-MCNC: 8.7 MG/DL (ref 8.6–10.3)
CHLORIDE SERPL-SCNC: 104 MMOL/L (ref 98–107)
CO2 SERPL-SCNC: 31 MMOL/L (ref 21–32)
CREAT SERPL-MCNC: 0.96 MG/DL (ref 0.5–1.3)
ERYTHROCYTE [DISTWIDTH] IN BLOOD BY AUTOMATED COUNT: 14.7 % (ref 11.5–14.5)
GFR SERPL CREATININE-BSD FRML MDRD: 80 ML/MIN/1.73M*2
GLUCOSE SERPL-MCNC: 92 MG/DL (ref 74–99)
HCT VFR BLD AUTO: 38.7 % (ref 41–52)
HGB BLD-MCNC: 12.8 G/DL (ref 13.5–17.5)
MCH RBC QN AUTO: 31 PG (ref 26–34)
MCHC RBC AUTO-ENTMCNC: 33.1 G/DL (ref 32–36)
MCV RBC AUTO: 94 FL (ref 80–100)
NRBC BLD-RTO: 0 /100 WBCS (ref 0–0)
PLATELET # BLD AUTO: 172 X10*3/UL (ref 150–450)
POTASSIUM SERPL-SCNC: 5 MMOL/L (ref 3.5–5.3)
RBC # BLD AUTO: 4.13 X10*6/UL (ref 4.5–5.9)
SODIUM SERPL-SCNC: 139 MMOL/L (ref 136–145)
WBC # BLD AUTO: 8.7 X10*3/UL (ref 4.4–11.3)

## 2023-11-20 PROCEDURE — 80048 BASIC METABOLIC PNL TOTAL CA: CPT

## 2023-11-20 PROCEDURE — 85027 COMPLETE CBC AUTOMATED: CPT

## 2023-11-23 ENCOUNTER — APPOINTMENT (OUTPATIENT)
Dept: RADIOLOGY | Facility: HOSPITAL | Age: 78
DRG: 871 | End: 2023-11-23
Payer: MEDICARE

## 2023-11-23 ENCOUNTER — HOSPITAL ENCOUNTER (INPATIENT)
Facility: HOSPITAL | Age: 78
LOS: 5 days | Discharge: NURSING, REHAB OR LONG-TERM CARE | DRG: 871 | End: 2023-11-29
Attending: EMERGENCY MEDICINE | Admitting: INTERNAL MEDICINE
Payer: MEDICARE

## 2023-11-23 DIAGNOSIS — J96.00 ACUTE RESPIRATORY FAILURE DUE TO COVID-19 (MULTI): Primary | ICD-10-CM

## 2023-11-23 DIAGNOSIS — Z00.00 ROUTINE GENERAL MEDICAL EXAMINATION AT HEALTH CARE FACILITY: ICD-10-CM

## 2023-11-23 DIAGNOSIS — J18.9 ACUTE PNEUMONIA: ICD-10-CM

## 2023-11-23 DIAGNOSIS — J96.22 ACUTE ON CHRONIC RESPIRATORY FAILURE WITH HYPOXIA AND HYPERCAPNIA (MULTI): ICD-10-CM

## 2023-11-23 DIAGNOSIS — U07.1 ACUTE RESPIRATORY FAILURE DUE TO COVID-19 (MULTI): Primary | ICD-10-CM

## 2023-11-23 DIAGNOSIS — U07.1 COVID-19: ICD-10-CM

## 2023-11-23 DIAGNOSIS — J96.21 ACUTE ON CHRONIC RESPIRATORY FAILURE WITH HYPOXIA AND HYPERCAPNIA (MULTI): ICD-10-CM

## 2023-11-23 LAB
ALBUMIN SERPL BCP-MCNC: 4.2 G/DL (ref 3.4–5)
ALP SERPL-CCNC: 84 U/L (ref 33–136)
ALT SERPL W P-5'-P-CCNC: 45 U/L (ref 10–52)
ANION GAP BLDV CALCULATED.4IONS-SCNC: 9 MMOL/L (ref 10–25)
ANION GAP SERPL CALC-SCNC: 11 MMOL/L (ref 10–20)
AST SERPL W P-5'-P-CCNC: 22 U/L (ref 9–39)
BASE EXCESS BLDV CALC-SCNC: 1.2 MMOL/L (ref -2–3)
BASOPHILS # BLD AUTO: 0.04 X10*3/UL (ref 0–0.1)
BASOPHILS NFR BLD AUTO: 0.2 %
BILIRUB SERPL-MCNC: 0.7 MG/DL (ref 0–1.2)
BNP SERPL-MCNC: 157 PG/ML (ref 0–99)
BODY TEMPERATURE: 37 DEGREES CELSIUS
BUN SERPL-MCNC: 35 MG/DL (ref 6–23)
CA-I BLDV-SCNC: 1.15 MMOL/L (ref 1.1–1.33)
CALCIUM SERPL-MCNC: 8.2 MG/DL (ref 8.6–10.3)
CARDIAC TROPONIN I PNL SERPL HS: 21 NG/L (ref 0–20)
CHLORIDE BLDV-SCNC: 102 MMOL/L (ref 98–107)
CHLORIDE SERPL-SCNC: 103 MMOL/L (ref 98–107)
CO2 SERPL-SCNC: 28 MMOL/L (ref 21–32)
CREAT SERPL-MCNC: 0.93 MG/DL (ref 0.5–1.3)
EOSINOPHIL # BLD AUTO: 0 X10*3/UL (ref 0–0.4)
EOSINOPHIL NFR BLD AUTO: 0 %
ERYTHROCYTE [DISTWIDTH] IN BLOOD BY AUTOMATED COUNT: 15.3 % (ref 11.5–14.5)
FLUAV RNA RESP QL NAA+PROBE: NOT DETECTED
FLUBV RNA RESP QL NAA+PROBE: NOT DETECTED
GFR SERPL CREATININE-BSD FRML MDRD: 83 ML/MIN/1.73M*2
GLUCOSE BLDV-MCNC: 184 MG/DL (ref 74–99)
GLUCOSE SERPL-MCNC: 163 MG/DL (ref 74–99)
HCO3 BLDV-SCNC: 30.7 MMOL/L (ref 22–26)
HCT VFR BLD AUTO: 44.2 % (ref 41–52)
HCT VFR BLD EST: 44 % (ref 41–52)
HGB BLD-MCNC: 14.3 G/DL (ref 13.5–17.5)
HGB BLDV-MCNC: 14.8 G/DL (ref 13.5–17.5)
IMM GRANULOCYTES # BLD AUTO: 0.16 X10*3/UL (ref 0–0.5)
IMM GRANULOCYTES NFR BLD AUTO: 0.7 % (ref 0–0.9)
INHALED O2 CONCENTRATION: 36 %
LACTATE BLDV-SCNC: 2.2 MMOL/L (ref 0.4–2)
LYMPHOCYTES # BLD AUTO: 0.57 X10*3/UL (ref 0.8–3)
LYMPHOCYTES NFR BLD AUTO: 2.7 %
MCH RBC QN AUTO: 30.8 PG (ref 26–34)
MCHC RBC AUTO-ENTMCNC: 32.4 G/DL (ref 32–36)
MCV RBC AUTO: 95 FL (ref 80–100)
MONOCYTES # BLD AUTO: 1.23 X10*3/UL (ref 0.05–0.8)
MONOCYTES NFR BLD AUTO: 5.8 %
NEUTROPHILS # BLD AUTO: 19.39 X10*3/UL (ref 1.6–5.5)
NEUTROPHILS NFR BLD AUTO: 90.6 %
NRBC BLD-RTO: 0 /100 WBCS (ref 0–0)
OXYHGB MFR BLDV: 41.4 % (ref 45–75)
PCO2 BLDV: 70 MM HG (ref 41–51)
PH BLDV: 7.25 PH (ref 7.33–7.43)
PLATELET # BLD AUTO: 215 X10*3/UL (ref 150–450)
PO2 BLDV: 36 MM HG (ref 35–45)
POTASSIUM BLDV-SCNC: 4.8 MMOL/L (ref 3.5–5.3)
POTASSIUM SERPL-SCNC: 4.7 MMOL/L (ref 3.5–5.3)
PROT SERPL-MCNC: 6.5 G/DL (ref 6.4–8.2)
RBC # BLD AUTO: 4.64 X10*6/UL (ref 4.5–5.9)
SAO2 % BLDV: 42 % (ref 45–75)
SARS-COV-2 RNA RESP QL NAA+PROBE: DETECTED
SODIUM BLDV-SCNC: 137 MMOL/L (ref 136–145)
SODIUM SERPL-SCNC: 137 MMOL/L (ref 136–145)
WBC # BLD AUTO: 21.4 X10*3/UL (ref 4.4–11.3)

## 2023-11-23 PROCEDURE — 96361 HYDRATE IV INFUSION ADD-ON: CPT

## 2023-11-23 PROCEDURE — 99285 EMERGENCY DEPT VISIT HI MDM: CPT | Mod: 25 | Performed by: EMERGENCY MEDICINE

## 2023-11-23 PROCEDURE — 94660 CPAP INITIATION&MGMT: CPT

## 2023-11-23 PROCEDURE — 99291 CRITICAL CARE FIRST HOUR: CPT | Performed by: EMERGENCY MEDICINE

## 2023-11-23 PROCEDURE — 85025 COMPLETE CBC W/AUTO DIFF WBC: CPT | Performed by: EMERGENCY MEDICINE

## 2023-11-23 PROCEDURE — 83880 ASSAY OF NATRIURETIC PEPTIDE: CPT | Performed by: EMERGENCY MEDICINE

## 2023-11-23 PROCEDURE — 96366 THER/PROPH/DIAG IV INF ADDON: CPT

## 2023-11-23 PROCEDURE — 87636 SARSCOV2 & INF A&B AMP PRB: CPT | Performed by: EMERGENCY MEDICINE

## 2023-11-23 PROCEDURE — 84484 ASSAY OF TROPONIN QUANT: CPT | Performed by: EMERGENCY MEDICINE

## 2023-11-23 PROCEDURE — 96374 THER/PROPH/DIAG INJ IV PUSH: CPT | Mod: 59

## 2023-11-23 PROCEDURE — 96375 TX/PRO/DX INJ NEW DRUG ADDON: CPT

## 2023-11-23 PROCEDURE — 84132 ASSAY OF SERUM POTASSIUM: CPT | Performed by: EMERGENCY MEDICINE

## 2023-11-23 PROCEDURE — 2500000004 HC RX 250 GENERAL PHARMACY W/ HCPCS (ALT 636 FOR OP/ED): Performed by: EMERGENCY MEDICINE

## 2023-11-23 PROCEDURE — 2500000002 HC RX 250 W HCPCS SELF ADMINISTERED DRUGS (ALT 637 FOR MEDICARE OP, ALT 636 FOR OP/ED): Performed by: EMERGENCY MEDICINE

## 2023-11-23 PROCEDURE — 84295 ASSAY OF SERUM SODIUM: CPT | Performed by: EMERGENCY MEDICINE

## 2023-11-23 PROCEDURE — 36415 COLL VENOUS BLD VENIPUNCTURE: CPT | Performed by: EMERGENCY MEDICINE

## 2023-11-23 PROCEDURE — 96372 THER/PROPH/DIAG INJ SC/IM: CPT | Mod: 25

## 2023-11-23 PROCEDURE — 71045 X-RAY EXAM CHEST 1 VIEW: CPT | Mod: FY

## 2023-11-23 PROCEDURE — 96365 THER/PROPH/DIAG IV INF INIT: CPT

## 2023-11-23 PROCEDURE — 71045 X-RAY EXAM CHEST 1 VIEW: CPT | Performed by: RADIOLOGY

## 2023-11-23 PROCEDURE — 82805 BLOOD GASES W/O2 SATURATION: CPT | Performed by: EMERGENCY MEDICINE

## 2023-11-23 RX ORDER — IPRATROPIUM BROMIDE AND ALBUTEROL SULFATE 2.5; .5 MG/3ML; MG/3ML
6 SOLUTION RESPIRATORY (INHALATION) ONCE
Status: COMPLETED | OUTPATIENT
Start: 2023-11-23 | End: 2023-11-23

## 2023-11-23 RX ADMIN — IPRATROPIUM BROMIDE AND ALBUTEROL SULFATE 6 ML: 2.5; .5 SOLUTION RESPIRATORY (INHALATION) at 22:47

## 2023-11-23 RX ADMIN — METHYLPREDNISOLONE SODIUM SUCCINATE 125 MG: 125 INJECTION, POWDER, FOR SOLUTION INTRAMUSCULAR; INTRAVENOUS at 23:00

## 2023-11-23 ASSESSMENT — COLUMBIA-SUICIDE SEVERITY RATING SCALE - C-SSRS
6. HAVE YOU EVER DONE ANYTHING, STARTED TO DO ANYTHING, OR PREPARED TO DO ANYTHING TO END YOUR LIFE?: NO
2. HAVE YOU ACTUALLY HAD ANY THOUGHTS OF KILLING YOURSELF?: NO
1. IN THE PAST MONTH, HAVE YOU WISHED YOU WERE DEAD OR WISHED YOU COULD GO TO SLEEP AND NOT WAKE UP?: NO

## 2023-11-23 ASSESSMENT — PAIN SCALES - GENERAL: PAINLEVEL_OUTOF10: 0 - NO PAIN

## 2023-11-23 ASSESSMENT — PAIN - FUNCTIONAL ASSESSMENT: PAIN_FUNCTIONAL_ASSESSMENT: 0-10

## 2023-11-24 PROBLEM — U07.1 ACUTE RESPIRATORY FAILURE DUE TO COVID-19 (MULTI): Status: ACTIVE | Noted: 2023-11-24

## 2023-11-24 PROBLEM — J96.00 ACUTE RESPIRATORY FAILURE DUE TO COVID-19 (MULTI): Status: ACTIVE | Noted: 2023-11-24

## 2023-11-24 LAB
ANION GAP BLDV CALCULATED.4IONS-SCNC: 10 MMOL/L (ref 10–25)
ANION GAP BLDV CALCULATED.4IONS-SCNC: 13 MMOL/L (ref 10–25)
ANION GAP BLDV CALCULATED.4IONS-SCNC: 16 MMOL/L (ref 10–25)
ANION GAP BLDV CALCULATED.4IONS-SCNC: 5 MMOL/L (ref 10–25)
ANION GAP BLDV CALCULATED.4IONS-SCNC: 7 MMOL/L (ref 10–25)
ANION GAP BLDV CALCULATED.4IONS-SCNC: 8 MMOL/L (ref 10–25)
ANION GAP BLDV CALCULATED.4IONS-SCNC: 8 MMOL/L (ref 10–25)
ANION GAP BLDV CALCULATED.4IONS-SCNC: 9 MMOL/L (ref 10–25)
ANION GAP BLDV CALCULATED.4IONS-SCNC: 9 MMOL/L (ref 10–25)
ANION GAP SERPL CALC-SCNC: 12 MMOL/L (ref 10–20)
BASE EXCESS BLDV CALC-SCNC: -0.1 MMOL/L (ref -2–3)
BASE EXCESS BLDV CALC-SCNC: -0.3 MMOL/L (ref -2–3)
BASE EXCESS BLDV CALC-SCNC: -0.3 MMOL/L (ref -2–3)
BASE EXCESS BLDV CALC-SCNC: -0.4 MMOL/L (ref -2–3)
BASE EXCESS BLDV CALC-SCNC: -1.9 MMOL/L (ref -2–3)
BASE EXCESS BLDV CALC-SCNC: -2.1 MMOL/L (ref -2–3)
BASE EXCESS BLDV CALC-SCNC: 0.3 MMOL/L (ref -2–3)
BASE EXCESS BLDV CALC-SCNC: 0.4 MMOL/L (ref -2–3)
BASE EXCESS BLDV CALC-SCNC: 0.8 MMOL/L (ref -2–3)
BASE EXCESS BLDV CALC-SCNC: 2.1 MMOL/L (ref -2–3)
BASE EXCESS BLDV CALC-SCNC: 3 MMOL/L (ref -2–3)
BASE EXCESS BLDV CALC-SCNC: 3.7 MMOL/L (ref -2–3)
BASOPHILS # BLD AUTO: 0.03 X10*3/UL (ref 0–0.1)
BASOPHILS NFR BLD AUTO: 0.2 %
BODY TEMPERATURE: 37 DEGREES CELSIUS
BUN SERPL-MCNC: 39 MG/DL (ref 6–23)
CA-I BLDV-SCNC: 1.03 MMOL/L (ref 1.1–1.33)
CA-I BLDV-SCNC: 1.05 MMOL/L (ref 1.1–1.33)
CA-I BLDV-SCNC: 1.06 MMOL/L (ref 1.1–1.33)
CA-I BLDV-SCNC: 1.07 MMOL/L (ref 1.1–1.33)
CA-I BLDV-SCNC: 1.08 MMOL/L (ref 1.1–1.33)
CA-I BLDV-SCNC: 1.08 MMOL/L (ref 1.1–1.33)
CA-I BLDV-SCNC: 1.09 MMOL/L (ref 1.1–1.33)
CALCIUM SERPL-MCNC: 7.4 MG/DL (ref 8.6–10.3)
CARDIAC TROPONIN I PNL SERPL HS: 26 NG/L (ref 0–20)
CHLORIDE BLDV-SCNC: 101 MMOL/L (ref 98–107)
CHLORIDE BLDV-SCNC: 103 MMOL/L (ref 98–107)
CHLORIDE BLDV-SCNC: 103 MMOL/L (ref 98–107)
CHLORIDE BLDV-SCNC: 104 MMOL/L (ref 98–107)
CHLORIDE BLDV-SCNC: 104 MMOL/L (ref 98–107)
CHLORIDE BLDV-SCNC: 105 MMOL/L (ref 98–107)
CHLORIDE BLDV-SCNC: 106 MMOL/L (ref 98–107)
CHLORIDE SERPL-SCNC: 105 MMOL/L (ref 98–107)
CO2 SERPL-SCNC: 25 MMOL/L (ref 21–32)
CREAT SERPL-MCNC: 1.01 MG/DL (ref 0.5–1.3)
D DIMER PPP FEU-MCNC: 1224 NG/ML FEU
EOSINOPHIL # BLD AUTO: 0 X10*3/UL (ref 0–0.4)
EOSINOPHIL NFR BLD AUTO: 0 %
ERYTHROCYTE [DISTWIDTH] IN BLOOD BY AUTOMATED COUNT: 15.1 % (ref 11.5–14.5)
FERRITIN SERPL-MCNC: 248 NG/ML (ref 20–300)
GFR SERPL CREATININE-BSD FRML MDRD: 75 ML/MIN/1.73M*2
GLUCOSE BLD MANUAL STRIP-MCNC: 151 MG/DL (ref 74–99)
GLUCOSE BLD MANUAL STRIP-MCNC: 197 MG/DL (ref 74–99)
GLUCOSE BLD MANUAL STRIP-MCNC: 209 MG/DL (ref 74–99)
GLUCOSE BLDV-MCNC: 172 MG/DL (ref 74–99)
GLUCOSE BLDV-MCNC: 175 MG/DL (ref 74–99)
GLUCOSE BLDV-MCNC: 181 MG/DL (ref 74–99)
GLUCOSE BLDV-MCNC: 193 MG/DL (ref 74–99)
GLUCOSE BLDV-MCNC: 212 MG/DL (ref 74–99)
GLUCOSE BLDV-MCNC: 218 MG/DL (ref 74–99)
GLUCOSE BLDV-MCNC: 223 MG/DL (ref 74–99)
GLUCOSE BLDV-MCNC: 225 MG/DL (ref 74–99)
GLUCOSE BLDV-MCNC: 236 MG/DL (ref 74–99)
GLUCOSE BLDV-MCNC: 241 MG/DL (ref 74–99)
GLUCOSE BLDV-MCNC: 255 MG/DL (ref 74–99)
GLUCOSE BLDV-MCNC: 262 MG/DL (ref 74–99)
GLUCOSE SERPL-MCNC: 204 MG/DL (ref 74–99)
HCO3 BLDV-SCNC: 25.7 MMOL/L (ref 22–26)
HCO3 BLDV-SCNC: 25.9 MMOL/L (ref 22–26)
HCO3 BLDV-SCNC: 25.9 MMOL/L (ref 22–26)
HCO3 BLDV-SCNC: 26.4 MMOL/L (ref 22–26)
HCO3 BLDV-SCNC: 26.6 MMOL/L (ref 22–26)
HCO3 BLDV-SCNC: 26.7 MMOL/L (ref 22–26)
HCO3 BLDV-SCNC: 27.4 MMOL/L (ref 22–26)
HCO3 BLDV-SCNC: 27.9 MMOL/L (ref 22–26)
HCO3 BLDV-SCNC: 27.9 MMOL/L (ref 22–26)
HCO3 BLDV-SCNC: 29 MMOL/L (ref 22–26)
HCO3 BLDV-SCNC: 29.7 MMOL/L (ref 22–26)
HCO3 BLDV-SCNC: 30.4 MMOL/L (ref 22–26)
HCT VFR BLD AUTO: 36 % (ref 41–52)
HCT VFR BLD EST: 32 % (ref 41–52)
HCT VFR BLD EST: 32 % (ref 41–52)
HCT VFR BLD EST: 33 % (ref 41–52)
HCT VFR BLD EST: 34 % (ref 41–52)
HCT VFR BLD EST: 35 % (ref 41–52)
HCT VFR BLD EST: 36 % (ref 41–52)
HCT VFR BLD EST: 36 % (ref 41–52)
HGB BLD-MCNC: 11.9 G/DL (ref 13.5–17.5)
HGB BLDV-MCNC: 10.7 G/DL (ref 13.5–17.5)
HGB BLDV-MCNC: 10.8 G/DL (ref 13.5–17.5)
HGB BLDV-MCNC: 10.9 G/DL (ref 13.5–17.5)
HGB BLDV-MCNC: 11.2 G/DL (ref 13.5–17.5)
HGB BLDV-MCNC: 11.2 G/DL (ref 13.5–17.5)
HGB BLDV-MCNC: 11.3 G/DL (ref 13.5–17.5)
HGB BLDV-MCNC: 11.3 G/DL (ref 13.5–17.5)
HGB BLDV-MCNC: 11.5 G/DL (ref 13.5–17.5)
HGB BLDV-MCNC: 11.6 G/DL (ref 13.5–17.5)
HGB BLDV-MCNC: 11.8 G/DL (ref 13.5–17.5)
HGB BLDV-MCNC: 11.9 G/DL (ref 13.5–17.5)
HGB BLDV-MCNC: 12.1 G/DL (ref 13.5–17.5)
IMM GRANULOCYTES # BLD AUTO: 0.09 X10*3/UL (ref 0–0.5)
IMM GRANULOCYTES NFR BLD AUTO: 0.5 % (ref 0–0.9)
INHALED O2 CONCENTRATION: 0 %
INHALED O2 CONCENTRATION: 1 %
INHALED O2 CONCENTRATION: 1 %
INHALED O2 CONCENTRATION: 40 %
LACTATE BLDV-SCNC: 1.4 MMOL/L (ref 0.4–2)
LACTATE BLDV-SCNC: 2.4 MMOL/L (ref 0.4–2)
LACTATE BLDV-SCNC: 2.4 MMOL/L (ref 0.4–2)
LACTATE BLDV-SCNC: 2.7 MMOL/L (ref 0.4–2)
LACTATE BLDV-SCNC: 2.7 MMOL/L (ref 0.4–2)
LACTATE BLDV-SCNC: 2.8 MMOL/L (ref 0.4–2)
LACTATE BLDV-SCNC: 2.8 MMOL/L (ref 0.4–2)
LACTATE BLDV-SCNC: 2.9 MMOL/L (ref 0.4–2)
LACTATE BLDV-SCNC: 3.2 MMOL/L (ref 0.4–2)
LACTATE BLDV-SCNC: 3.2 MMOL/L (ref 0.4–2)
LACTATE BLDV-SCNC: 3.4 MMOL/L (ref 0.4–2)
LACTATE BLDV-SCNC: 3.6 MMOL/L (ref 0.4–2)
LACTATE BLDV-SCNC: 3.6 MMOL/L (ref 0.4–2)
LACTATE BLDV-SCNC: 3.9 MMOL/L (ref 0.4–2)
LACTATE BLDV-SCNC: 3.9 MMOL/L (ref 0.4–2)
LACTATE BLDV-SCNC: 4.2 MMOL/L (ref 0.4–2)
LACTATE BLDV-SCNC: 4.5 MMOL/L (ref 0.4–2)
LACTATE BLDV-SCNC: 4.5 MMOL/L (ref 0.4–2)
LACTATE BLDV-SCNC: 4.7 MMOL/L (ref 0.4–2)
LACTATE BLDV-SCNC: 5.2 MMOL/L (ref 0.4–2)
LACTATE BLDV-SCNC: 5.2 MMOL/L (ref 0.4–2)
LACTATE SERPL-SCNC: 1.3 MMOL/L (ref 0.4–2)
LACTATE SERPL-SCNC: 3.1 MMOL/L (ref 0.4–2)
LYMPHOCYTES # BLD AUTO: 0.11 X10*3/UL (ref 0.8–3)
LYMPHOCYTES NFR BLD AUTO: 0.6 %
MCH RBC QN AUTO: 31.4 PG (ref 26–34)
MCHC RBC AUTO-ENTMCNC: 33.1 G/DL (ref 32–36)
MCV RBC AUTO: 95 FL (ref 80–100)
MONOCYTES # BLD AUTO: 0.66 X10*3/UL (ref 0.05–0.8)
MONOCYTES NFR BLD AUTO: 3.4 %
NEUTROPHILS # BLD AUTO: 18.32 X10*3/UL (ref 1.6–5.5)
NEUTROPHILS NFR BLD AUTO: 95.3 %
NRBC BLD-RTO: 0 /100 WBCS (ref 0–0)
OXYHGB MFR BLDV: 39.8 % (ref 45–75)
OXYHGB MFR BLDV: 45 % (ref 45–75)
OXYHGB MFR BLDV: 52.8 % (ref 45–75)
OXYHGB MFR BLDV: 53.8 % (ref 45–75)
OXYHGB MFR BLDV: 58.2 % (ref 45–75)
OXYHGB MFR BLDV: 58.5 % (ref 45–75)
OXYHGB MFR BLDV: 59.8 % (ref 45–75)
OXYHGB MFR BLDV: 61.3 % (ref 45–75)
OXYHGB MFR BLDV: 62.8 % (ref 45–75)
OXYHGB MFR BLDV: 70.4 % (ref 45–75)
OXYHGB MFR BLDV: 73 % (ref 45–75)
OXYHGB MFR BLDV: 76.2 % (ref 45–75)
PCO2 BLDV: 48 MM HG (ref 41–51)
PCO2 BLDV: 50 MM HG (ref 41–51)
PCO2 BLDV: 52 MM HG (ref 41–51)
PCO2 BLDV: 53 MM HG (ref 41–51)
PCO2 BLDV: 54 MM HG (ref 41–51)
PCO2 BLDV: 55 MM HG (ref 41–51)
PCO2 BLDV: 56 MM HG (ref 41–51)
PCO2 BLDV: 58 MM HG (ref 41–51)
PCO2 BLDV: 58 MM HG (ref 41–51)
PCO2 BLDV: 59 MM HG (ref 41–51)
PH BLDV: 7.25 PH (ref 7.33–7.43)
PH BLDV: 7.27 PH (ref 7.33–7.43)
PH BLDV: 7.29 PH (ref 7.33–7.43)
PH BLDV: 7.29 PH (ref 7.33–7.43)
PH BLDV: 7.3 PH (ref 7.33–7.43)
PH BLDV: 7.31 PH (ref 7.33–7.43)
PH BLDV: 7.33 PH (ref 7.33–7.43)
PH BLDV: 7.34 PH (ref 7.33–7.43)
PH BLDV: 7.34 PH (ref 7.33–7.43)
PH BLDV: 7.35 PH (ref 7.33–7.43)
PLATELET # BLD AUTO: 169 X10*3/UL (ref 150–450)
PO2 BLDV: 34 MM HG (ref 35–45)
PO2 BLDV: 34 MM HG (ref 35–45)
PO2 BLDV: 38 MM HG (ref 35–45)
PO2 BLDV: 38 MM HG (ref 35–45)
PO2 BLDV: 40 MM HG (ref 35–45)
PO2 BLDV: 42 MM HG (ref 35–45)
PO2 BLDV: 42 MM HG (ref 35–45)
PO2 BLDV: 43 MM HG (ref 35–45)
PO2 BLDV: 43 MM HG (ref 35–45)
PO2 BLDV: 48 MM HG (ref 35–45)
PO2 BLDV: 49 MM HG (ref 35–45)
PO2 BLDV: 50 MM HG (ref 35–45)
POTASSIUM BLDV-SCNC: 4.2 MMOL/L (ref 3.5–5.3)
POTASSIUM BLDV-SCNC: 4.2 MMOL/L (ref 3.5–5.3)
POTASSIUM BLDV-SCNC: 4.3 MMOL/L (ref 3.5–5.3)
POTASSIUM BLDV-SCNC: 4.4 MMOL/L (ref 3.5–5.3)
POTASSIUM BLDV-SCNC: 4.5 MMOL/L (ref 3.5–5.3)
POTASSIUM BLDV-SCNC: 4.6 MMOL/L (ref 3.5–5.3)
POTASSIUM BLDV-SCNC: 4.6 MMOL/L (ref 3.5–5.3)
POTASSIUM BLDV-SCNC: 4.7 MMOL/L (ref 3.5–5.3)
POTASSIUM BLDV-SCNC: 4.8 MMOL/L (ref 3.5–5.3)
POTASSIUM BLDV-SCNC: 5 MMOL/L (ref 3.5–5.3)
POTASSIUM SERPL-SCNC: 4.6 MMOL/L (ref 3.5–5.3)
PROCALCITONIN SERPL-MCNC: 2.34 NG/ML
RBC # BLD AUTO: 3.79 X10*6/UL (ref 4.5–5.9)
SAO2 % BLDV: 40 % (ref 45–75)
SAO2 % BLDV: 46 % (ref 45–75)
SAO2 % BLDV: 54 % (ref 45–75)
SAO2 % BLDV: 55 % (ref 45–75)
SAO2 % BLDV: 59 % (ref 45–75)
SAO2 % BLDV: 60 % (ref 45–75)
SAO2 % BLDV: 61 % (ref 45–75)
SAO2 % BLDV: 62 % (ref 45–75)
SAO2 % BLDV: 64 % (ref 45–75)
SAO2 % BLDV: 72 % (ref 45–75)
SAO2 % BLDV: 75 % (ref 45–75)
SAO2 % BLDV: 78 % (ref 45–75)
SODIUM BLDV-SCNC: 135 MMOL/L (ref 136–145)
SODIUM BLDV-SCNC: 136 MMOL/L (ref 136–145)
SODIUM BLDV-SCNC: 137 MMOL/L (ref 136–145)
SODIUM BLDV-SCNC: 138 MMOL/L (ref 136–145)
SODIUM SERPL-SCNC: 137 MMOL/L (ref 136–145)
WBC # BLD AUTO: 19.2 X10*3/UL (ref 4.4–11.3)

## 2023-11-24 PROCEDURE — 82947 ASSAY GLUCOSE BLOOD QUANT: CPT

## 2023-11-24 PROCEDURE — 83605 ASSAY OF LACTIC ACID: CPT | Performed by: INTERNAL MEDICINE

## 2023-11-24 PROCEDURE — 2500000004 HC RX 250 GENERAL PHARMACY W/ HCPCS (ALT 636 FOR OP/ED): Performed by: INTERNAL MEDICINE

## 2023-11-24 PROCEDURE — 83605 ASSAY OF LACTIC ACID: CPT | Performed by: EMERGENCY MEDICINE

## 2023-11-24 PROCEDURE — 82330 ASSAY OF CALCIUM: CPT | Performed by: EMERGENCY MEDICINE

## 2023-11-24 PROCEDURE — 87040 BLOOD CULTURE FOR BACTERIA: CPT | Mod: PORLAB | Performed by: EMERGENCY MEDICINE

## 2023-11-24 PROCEDURE — 85025 COMPLETE CBC W/AUTO DIFF WBC: CPT | Performed by: INTERNAL MEDICINE

## 2023-11-24 PROCEDURE — 2500000005 HC RX 250 GENERAL PHARMACY W/O HCPCS: Performed by: INTERNAL MEDICINE

## 2023-11-24 PROCEDURE — 2060000001 HC INTERMEDIATE ICU ROOM DAILY

## 2023-11-24 PROCEDURE — 94660 CPAP INITIATION&MGMT: CPT

## 2023-11-24 PROCEDURE — 87075 CULTR BACTERIA EXCEPT BLOOD: CPT | Mod: PORLAB | Performed by: EMERGENCY MEDICINE

## 2023-11-24 PROCEDURE — 99223 1ST HOSP IP/OBS HIGH 75: CPT | Performed by: INTERNAL MEDICINE

## 2023-11-24 PROCEDURE — 2500000004 HC RX 250 GENERAL PHARMACY W/ HCPCS (ALT 636 FOR OP/ED): Performed by: STUDENT IN AN ORGANIZED HEALTH CARE EDUCATION/TRAINING PROGRAM

## 2023-11-24 PROCEDURE — 84484 ASSAY OF TROPONIN QUANT: CPT | Performed by: EMERGENCY MEDICINE

## 2023-11-24 PROCEDURE — 84132 ASSAY OF SERUM POTASSIUM: CPT | Performed by: INTERNAL MEDICINE

## 2023-11-24 PROCEDURE — 84132 ASSAY OF SERUM POTASSIUM: CPT | Performed by: EMERGENCY MEDICINE

## 2023-11-24 PROCEDURE — 82805 BLOOD GASES W/O2 SATURATION: CPT | Performed by: EMERGENCY MEDICINE

## 2023-11-24 PROCEDURE — 82728 ASSAY OF FERRITIN: CPT | Performed by: INTERNAL MEDICINE

## 2023-11-24 PROCEDURE — 36415 COLL VENOUS BLD VENIPUNCTURE: CPT | Performed by: EMERGENCY MEDICINE

## 2023-11-24 PROCEDURE — 2500000004 HC RX 250 GENERAL PHARMACY W/ HCPCS (ALT 636 FOR OP/ED): Performed by: EMERGENCY MEDICINE

## 2023-11-24 PROCEDURE — 84145 PROCALCITONIN (PCT): CPT | Mod: PORLAB | Performed by: INTERNAL MEDICINE

## 2023-11-24 PROCEDURE — 85379 FIBRIN DEGRADATION QUANT: CPT | Performed by: INTERNAL MEDICINE

## 2023-11-24 RX ORDER — DEXTROSE MONOHYDRATE 100 MG/ML
0.3 INJECTION, SOLUTION INTRAVENOUS ONCE AS NEEDED
Status: DISCONTINUED | OUTPATIENT
Start: 2023-11-24 | End: 2023-11-29 | Stop reason: HOSPADM

## 2023-11-24 RX ORDER — DEXAMETHASONE 6 MG/1
6 TABLET ORAL DAILY
Status: DISCONTINUED | OUTPATIENT
Start: 2023-11-24 | End: 2023-11-29 | Stop reason: HOSPADM

## 2023-11-24 RX ORDER — DEXAMETHASONE SODIUM PHOSPHATE 100 MG/10ML
6 INJECTION INTRAMUSCULAR; INTRAVENOUS DAILY
Status: DISCONTINUED | OUTPATIENT
Start: 2023-11-24 | End: 2023-11-29 | Stop reason: HOSPADM

## 2023-11-24 RX ORDER — POLYETHYLENE GLYCOL 3350 17 G/17G
17 POWDER, FOR SOLUTION ORAL DAILY
Status: DISCONTINUED | OUTPATIENT
Start: 2023-11-24 | End: 2023-11-29 | Stop reason: HOSPADM

## 2023-11-24 RX ORDER — DEXTROSE 50 % IN WATER (D50W) INTRAVENOUS SYRINGE
25
Status: DISCONTINUED | OUTPATIENT
Start: 2023-11-24 | End: 2023-11-29 | Stop reason: HOSPADM

## 2023-11-24 RX ORDER — GLYCOPYRROLATE 0.2 MG/ML
0.2 INJECTION INTRAMUSCULAR; INTRAVENOUS ONCE
Status: COMPLETED | OUTPATIENT
Start: 2023-11-24 | End: 2023-11-24

## 2023-11-24 RX ORDER — DEXAMETHASONE SODIUM PHOSPHATE 4 MG/ML
6 INJECTION, SOLUTION INTRA-ARTICULAR; INTRALESIONAL; INTRAMUSCULAR; INTRAVENOUS; SOFT TISSUE ONCE
Status: COMPLETED | OUTPATIENT
Start: 2023-11-24 | End: 2023-11-24

## 2023-11-24 RX ORDER — HEPARIN SODIUM 5000 [USP'U]/ML
5000 INJECTION, SOLUTION INTRAVENOUS; SUBCUTANEOUS EVERY 8 HOURS
Status: DISCONTINUED | OUTPATIENT
Start: 2023-11-24 | End: 2023-11-29 | Stop reason: HOSPADM

## 2023-11-24 RX ORDER — DEXAMETHASONE 0.5 MG/5ML
6 ELIXIR ORAL DAILY
Status: DISCONTINUED | OUTPATIENT
Start: 2023-11-24 | End: 2023-11-29 | Stop reason: HOSPADM

## 2023-11-24 RX ORDER — DEXAMETHASONE SODIUM PHOSPHATE 100 MG/10ML
6 INJECTION INTRAMUSCULAR; INTRAVENOUS ONCE
Status: DISCONTINUED | OUTPATIENT
Start: 2023-11-24 | End: 2023-11-24

## 2023-11-24 RX ADMIN — PIPERACILLIN SODIUM AND TAZOBACTAM SODIUM 3.38 G: 3; .375 INJECTION, SOLUTION INTRAVENOUS at 10:36

## 2023-11-24 RX ADMIN — Medication 200 MG: at 06:16

## 2023-11-24 RX ADMIN — PIPERACILLIN SODIUM AND TAZOBACTAM SODIUM 4.5 G: 4; .5 INJECTION, SOLUTION INTRAVENOUS at 03:36

## 2023-11-24 RX ADMIN — HEPARIN SODIUM 5000 UNITS: 5000 INJECTION INTRAVENOUS; SUBCUTANEOUS at 03:36

## 2023-11-24 RX ADMIN — PIPERACILLIN SODIUM AND TAZOBACTAM SODIUM 3.38 G: 3; .375 INJECTION, SOLUTION INTRAVENOUS at 22:27

## 2023-11-24 RX ADMIN — GLYCOPYRROLATE 0.2 MG: 0.2 INJECTION INTRAMUSCULAR; INTRAVENOUS at 17:18

## 2023-11-24 RX ADMIN — DEXAMETHASONE SODIUM PHOSPHATE 6 MG: 4 INJECTION, SOLUTION INTRAMUSCULAR; INTRAVENOUS at 00:58

## 2023-11-24 RX ADMIN — HEPARIN SODIUM 5000 UNITS: 5000 INJECTION INTRAVENOUS; SUBCUTANEOUS at 18:04

## 2023-11-24 RX ADMIN — SODIUM CHLORIDE 1000 ML: 9 INJECTION, SOLUTION INTRAVENOUS at 18:04

## 2023-11-24 RX ADMIN — SODIUM CHLORIDE 1000 ML: 9 INJECTION, SOLUTION INTRAVENOUS at 00:53

## 2023-11-24 RX ADMIN — HEPARIN SODIUM 5000 UNITS: 5000 INJECTION INTRAVENOUS; SUBCUTANEOUS at 10:45

## 2023-11-24 RX ADMIN — DEXAMETHASONE 6 MG: 6 TABLET ORAL at 10:40

## 2023-11-24 RX ADMIN — PIPERACILLIN SODIUM AND TAZOBACTAM SODIUM 3.38 G: 3; .375 INJECTION, SOLUTION INTRAVENOUS at 15:39

## 2023-11-24 SDOH — SOCIAL STABILITY: SOCIAL INSECURITY: DO YOU FEEL UNSAFE GOING BACK TO THE PLACE WHERE YOU ARE LIVING?: NO

## 2023-11-24 SDOH — SOCIAL STABILITY: SOCIAL INSECURITY: HAVE YOU HAD THOUGHTS OF HARMING ANYONE ELSE?: NO

## 2023-11-24 SDOH — HEALTH STABILITY: PHYSICAL HEALTH: ON AVERAGE, HOW MANY MINUTES DO YOU ENGAGE IN EXERCISE AT THIS LEVEL?: 0 MIN

## 2023-11-24 SDOH — SOCIAL STABILITY: SOCIAL INSECURITY: DOES ANYONE TRY TO KEEP YOU FROM HAVING/CONTACTING OTHER FRIENDS OR DOING THINGS OUTSIDE YOUR HOME?: NO

## 2023-11-24 SDOH — SOCIAL STABILITY: SOCIAL INSECURITY: ARE THERE ANY APPARENT SIGNS OF INJURIES/BEHAVIORS THAT COULD BE RELATED TO ABUSE/NEGLECT?: NO

## 2023-11-24 SDOH — SOCIAL STABILITY: SOCIAL INSECURITY: HAS ANYONE EVER THREATENED TO HURT YOUR FAMILY OR YOUR PETS?: NO

## 2023-11-24 SDOH — SOCIAL STABILITY: SOCIAL INSECURITY: ARE YOU OR HAVE YOU BEEN THREATENED OR ABUSED PHYSICALLY, EMOTIONALLY, OR SEXUALLY BY ANYONE?: NO

## 2023-11-24 SDOH — SOCIAL STABILITY: SOCIAL INSECURITY: DO YOU FEEL ANYONE HAS EXPLOITED OR TAKEN ADVANTAGE OF YOU FINANCIALLY OR OF YOUR PERSONAL PROPERTY?: NO

## 2023-11-24 SDOH — SOCIAL STABILITY: SOCIAL INSECURITY: ABUSE: ADULT

## 2023-11-24 SDOH — SOCIAL STABILITY: SOCIAL INSECURITY: WERE YOU ABLE TO COMPLETE ALL THE BEHAVIORAL HEALTH SCREENINGS?: YES

## 2023-11-24 ASSESSMENT — COGNITIVE AND FUNCTIONAL STATUS - GENERAL
HELP NEEDED FOR BATHING: A LOT
TOILETING: A LOT
WALKING IN HOSPITAL ROOM: A LOT
DRESSING REGULAR LOWER BODY CLOTHING: A LOT
STANDING UP FROM CHAIR USING ARMS: A LOT
CLIMB 3 TO 5 STEPS WITH RAILING: TOTAL
MOVING TO AND FROM BED TO CHAIR: A LOT
PERSONAL GROOMING: A LITTLE
MOBILITY SCORE: 14
TURNING FROM BACK TO SIDE WHILE IN FLAT BAD: A LITTLE
DAILY ACTIVITIY SCORE: 15
EATING MEALS: A LITTLE
DRESSING REGULAR UPPER BODY CLOTHING: A LITTLE

## 2023-11-24 ASSESSMENT — LIFESTYLE VARIABLES
SUBSTANCE_ABUSE_PAST_12_MONTHS: NO
AUDIT-C TOTAL SCORE: 0
HOW OFTEN DO YOU HAVE 6 OR MORE DRINKS ON ONE OCCASION: NEVER
AUDIT-C TOTAL SCORE: 0
SKIP TO QUESTIONS 9-10: 1
HOW OFTEN DO YOU HAVE A DRINK CONTAINING ALCOHOL: NEVER
PRESCIPTION_ABUSE_PAST_12_MONTHS: NO
HOW MANY STANDARD DRINKS CONTAINING ALCOHOL DO YOU HAVE ON A TYPICAL DAY: PATIENT DOES NOT DRINK

## 2023-11-24 ASSESSMENT — PAIN - FUNCTIONAL ASSESSMENT
PAIN_FUNCTIONAL_ASSESSMENT: 0-10

## 2023-11-24 ASSESSMENT — ACTIVITIES OF DAILY LIVING (ADL)
GROOMING: NEEDS ASSISTANCE
HEARING - LEFT EAR: DIFFICULTY WITH NOISE
DRESSING YOURSELF: NEEDS ASSISTANCE
ASSISTIVE_DEVICE: WALKER
TOILETING: NEEDS ASSISTANCE
BATHING: NEEDS ASSISTANCE
WALKS IN HOME: NEEDS ASSISTANCE
JUDGMENT_ADEQUATE_SAFELY_COMPLETE_DAILY_ACTIVITIES: YES
PATIENT'S MEMORY ADEQUATE TO SAFELY COMPLETE DAILY ACTIVITIES?: YES
ADEQUATE_TO_COMPLETE_ADL: YES
HEARING - RIGHT EAR: DIFFICULTY WITH NOISE
FEEDING YOURSELF: INDEPENDENT
LACK_OF_TRANSPORTATION: NO

## 2023-11-24 ASSESSMENT — PATIENT HEALTH QUESTIONNAIRE - PHQ9
SUM OF ALL RESPONSES TO PHQ9 QUESTIONS 1 & 2: 0
1. LITTLE INTEREST OR PLEASURE IN DOING THINGS: NOT AT ALL
2. FEELING DOWN, DEPRESSED OR HOPELESS: NOT AT ALL

## 2023-11-24 ASSESSMENT — PAIN SCALES - GENERAL
PAINLEVEL_OUTOF10: 0 - NO PAIN

## 2023-11-24 NOTE — CARE PLAN
History and chart reviewed.  Patient seen and examined.  I agree with Dr Castañeda's assessment and plan.    1 L normal saline bolus given  Wean off BiPAP

## 2023-11-24 NOTE — H&P
Assessment/Plan   1.)  Acute hypoxic/hypercapnic respiratory failure secondary to COVID-19  The patient will be admitted to stepdown COVID isolation with airborne, and droplet isolation precautions.  Patient is currently on BiPAP, and I will continue this with settings of 12 over 40% FiO2 to titrate keeping the oxygen saturation greater than 90%.  I will place a consult for infectious disease to see the patient in the morning.  He did receive Decadron in the emergency room, and I will continue this 6 mg p.o. daily.  He also received remdesivir, and I will continue this per protocol.  I am thinking the patient might have a dual infection here with a white count of 21,400.  I will therefore continue Zosyn which was initially given in the emergency room.  I did independently review a chest x-ray which was done in the emergency room today, and my interpretation is right greater than left pneumonia along with changes that appear consistent with COPD.  I also independently reviewed a rhythm strip done in the emergency room, and my interpretation is sinus tachycardia with a rate of 111 bpm.  2.)  Sepsis  In addition to the above I did order 2 sets of blood cultures.  Continuing Zosyn 3.375 g IV every 6 hours starting at 8 AM tomorrow.  The patient did receive 4.5 g of Zosyn in the emergency room.  Await input from infectious disease.  I believe that possibly the patient may have dual infections (bacterial and COVID).  3.)  Altered mental status  The patient has had mental status changes based on his CO2 retention in the past, and has regained baseline status once these were corrected.  I will continue the BiPAP as ordered above.  Will do every 2 hour neurochecks to the track his mental status.  4.)  COPD  Please see above.  5.)  Type 2 diabetes mellitus  At present I will start an insulin sliding scale.  Patient's medications still need to reconcile.  We will make adjustments accordingly.  6.)  Hypertension  Blood  pressure is currently good.  Will continue home medications once reconciled.  Will adjust if necessary.  7.)  Hyperlipidemia  I believe his medication will be atorvastatin 10 mg p.o. at bedtime.  This needs to be verified.    Chief Complaint   Patient presents with    Shortness of Breath     SOB that started yesterday       History Of Present Illness  Hai Laboy is a 78 y.o. male presenting with the above.  Subsequent testing here in the emergency room showed the patient has COVID-19.  His x-ray does appear that he has pneumonia most likely related to this.  When I seen the patient he was somewhat altered and cannot give a detailed history.  He was on BiPAP, and he was very weak.  Past medical history is consistent with COPD, hypertension, hyperlipidemia, type 2 diabetes mellitus, prostate cancer, and previous stroke.  According to reports the patient was much more alert on arrival to the ER, appears to have decompensated to some extent.  Other than being confused she is not distressed.  Past medical history is consistent with COPD, essential hypertension, hyperlipidemia, type 2 diabetes mellitus, prostate cancer, previous stroke, and some type of nonspecific parasitic infection which I could not find any details on.  No further history could be obtained.  He presents at this time for further evaluation.    Past Medical History  COPD  Essential hypertension  Hyperlipidemia  Type 2 diabetes mellitus  Prostate cancer  Stroke  Parasitic infection      Surgical History  He has a past surgical history that includes Other surgical history (04/27/2018) and CT angio neck (4/14/2023).    Allergies  Patient has no known allergies.     Social History  He reports that he has quit smoking. His smoking use included cigarettes. He has never used smokeless tobacco. He reports that he does not drink alcohol and does not use drugs.    Family History  Family History   Problem Relation Name Age of Onset    No Known Problems Mother       No Known Problems Father         Current Medication  piperacillin-tazobactam, 4.5 g, intravenous, Once  remdesivir, 200 mg, intravenous, Once   Followed by  [START ON 11/25/2023] remdesivir, 100 mg, intravenous, q24h  remdesivir, 200 mg, intravenous, Once      Current Outpatient Medications   Medication Instructions    albuterol 2.5 mg /3 mL (0.083 %) nebulizer solution 3 mL, inhalation, Every 6 hours    albuterol 90 mcg/actuation inhaler inhalation, Every 6 hours PRN    aspirin 81 mg EC tablet 1 tablet, oral, Daily    atorvastatin (Lipitor) 10 mg tablet 1 tablet, oral, Daily    clopidogrel (PLAVIX) 75 mg, oral, Daily    Gemtesa 75 mg, oral, Daily    guaiFENesin (MUCINEX) 1,200 mg, oral, 2 times daily, Do not crush, chew, or split.    lisinopril 40 mg tablet 1 tablet, oral, Daily    multivitamin tablet 1 tablet, oral, Daily    polyethylene glycol (GLYCOLAX, MIRALAX) 17 g, oral, Daily    predniSONE (Deltasone) 10 mg tablet Take 4 tablets (40 mg) by mouth once daily for 3 days, THEN 3 tablets (30 mg) once daily for 3 days, THEN 2 tablets (20 mg) once daily for 3 days, THEN 1 tablet (10 mg) once daily for 3 days.    Trelegy Ellipta 100-62.5-25 mcg blister with device inhalation        Review of Systems  Unable to obtain secondary to his altered mental status.    Physical Exam  Last Recorded Vitals  /60   Pulse (!) 111   Temp 36.4 °C (97.5 °F)   Resp (!) 30   Wt 62.5 kg (137 lb 12.6 oz)   SpO2 99%   General: Alert when aroused, but is unable to verbalize much at present.  Not distressed.  Currently wearing BiPAP.  Skin: Warm and dry.  No rashes.  Neck: Supple.  No adenopathy or bruit.  HEENT: No infectious processes.  Passages are clear.  Heart: Regular without murmurs, gallops, and or rubs.  Lungs: Bibasilar rales with slight wheezing bilaterally.  No rhonchi.  Abdomen: Soft and nontender.  Bowel sounds positive.  No guarding or rebound.  No palpable masses.  Extremities: Without distal edema and/or  cyanosis.  Neurological: The patient moves all extremities.  Babinski's are bilaterally indeterminate.  Difficult to assess at present.    Relevant Results  Results for orders placed or performed during the hospital encounter of 11/23/23 (from the past 24 hour(s))   Sars-CoV-2 and Influenza A/B PCR   Result Value Ref Range    Flu A Result Not Detected Not Detected    Flu B Result Not Detected Not Detected    Coronavirus 2019, PCR Detected (A) Not Detected   CBC and Auto Differential   Result Value Ref Range    WBC 21.4 (H) 4.4 - 11.3 x10*3/uL    nRBC 0.0 0.0 - 0.0 /100 WBCs    RBC 4.64 4.50 - 5.90 x10*6/uL    Hemoglobin 14.3 13.5 - 17.5 g/dL    Hematocrit 44.2 41.0 - 52.0 %    MCV 95 80 - 100 fL    MCH 30.8 26.0 - 34.0 pg    MCHC 32.4 32.0 - 36.0 g/dL    RDW 15.3 (H) 11.5 - 14.5 %    Platelets 215 150 - 450 x10*3/uL    Neutrophils % 90.6 40.0 - 80.0 %    Immature Granulocytes %, Automated 0.7 0.0 - 0.9 %    Lymphocytes % 2.7 13.0 - 44.0 %    Monocytes % 5.8 2.0 - 10.0 %    Eosinophils % 0.0 0.0 - 6.0 %    Basophils % 0.2 0.0 - 2.0 %    Neutrophils Absolute 19.39 (H) 1.60 - 5.50 x10*3/uL    Immature Granulocytes Absolute, Automated 0.16 0.00 - 0.50 x10*3/uL    Lymphocytes Absolute 0.57 (L) 0.80 - 3.00 x10*3/uL    Monocytes Absolute 1.23 (H) 0.05 - 0.80 x10*3/uL    Eosinophils Absolute 0.00 0.00 - 0.40 x10*3/uL    Basophils Absolute 0.04 0.00 - 0.10 x10*3/uL   Comprehensive metabolic panel   Result Value Ref Range    Glucose 163 (H) 74 - 99 mg/dL    Sodium 137 136 - 145 mmol/L    Potassium 4.7 3.5 - 5.3 mmol/L    Chloride 103 98 - 107 mmol/L    Bicarbonate 28 21 - 32 mmol/L    Anion Gap 11 10 - 20 mmol/L    Urea Nitrogen 35 (H) 6 - 23 mg/dL    Creatinine 0.93 0.50 - 1.30 mg/dL    eGFR 83 >60 mL/min/1.73m*2    Calcium 8.2 (L) 8.6 - 10.3 mg/dL    Albumin 4.2 3.4 - 5.0 g/dL    Alkaline Phosphatase 84 33 - 136 U/L    Total Protein 6.5 6.4 - 8.2 g/dL    AST 22 9 - 39 U/L    Bilirubin, Total 0.7 0.0 - 1.2 mg/dL    ALT 45 10  - 52 U/L   B-Type Natriuretic Peptide   Result Value Ref Range     (H) 0 - 99 pg/mL   Blood Gas Venous Full Panel   Result Value Ref Range    POCT pH, Venous 7.25 (LL) 7.33 - 7.43 pH    POCT pCO2, Venous 70 (HH) 41 - 51 mm Hg    POCT pO2, Venous 36 35 - 45 mm Hg    POCT SO2, Venous 42 (L) 45 - 75 %    POCT Oxy Hemoglobin, Venous 41.4 (L) 45.0 - 75.0 %    POCT Hematocrit Calculated, Venous 44.0 41.0 - 52.0 %    POCT Sodium, Venous 137 136 - 145 mmol/L    POCT Potassium, Venous 4.8 3.5 - 5.3 mmol/L    POCT Chloride, Venous 102 98 - 107 mmol/L    POCT Ionized Calicum, Venous 1.15 1.10 - 1.33 mmol/L    POCT Glucose, Venous 184 (H) 74 - 99 mg/dL    POCT Lactate, Venous 2.2 (H) 0.4 - 2.0 mmol/L    POCT Base Excess, Venous 1.2 -2.0 - 3.0 mmol/L    POCT HCO3 Calculated, Venous 30.7 (H) 22.0 - 26.0 mmol/L    POCT Hemoglobin, Venous 14.8 13.5 - 17.5 g/dL    POCT Anion Gap, Venous 9.0 (L) 10.0 - 25.0 mmol/L    Patient Temperature 37.0 degrees Celsius    FiO2 36 %   Troponin I, High Sensitivity, Initial   Result Value Ref Range    Troponin I, High Sensitivity 21 (H) 0 - 20 ng/L   Troponin, High Sensitivity, 1 Hour   Result Value Ref Range    Troponin I, High Sensitivity 26 (H) 0 - 20 ng/L   Blood Gas Lactic Acid, Venous   Result Value Ref Range    POCT Lactate, Venous 2.8 (H) 0.4 - 2.0 mmol/L   BLOOD GAS VENOUS FULL PANEL   Result Value Ref Range    POCT pH, Venous 7.34 7.33 - 7.43 pH    POCT pCO2, Venous 48 41 - 51 mm Hg    POCT pO2, Venous 50 (H) 35 - 45 mm Hg    POCT SO2, Venous 78 (H) 45 - 75 %    POCT Oxy Hemoglobin, Venous 76.2 (H) 45.0 - 75.0 %    POCT Hematocrit Calculated, Venous 36.0 (L) 41.0 - 52.0 %    POCT Sodium, Venous 135 (L) 136 - 145 mmol/L    POCT Potassium, Venous 5.0 3.5 - 5.3 mmol/L    POCT Chloride, Venous 104 98 - 107 mmol/L    POCT Ionized Calicum, Venous 1.03 (L) 1.10 - 1.33 mmol/L    POCT Glucose, Venous 223 (H) 74 - 99 mg/dL    POCT Lactate, Venous 2.8 (H) 0.4 - 2.0 mmol/L    POCT Base Excess,  Venous -0.3 -2.0 - 3.0 mmol/L    POCT HCO3 Calculated, Venous 25.9 22.0 - 26.0 mmol/L    POCT Hemoglobin, Venous 12.1 (L) 13.5 - 17.5 g/dL    POCT Anion Gap, Venous 10.0 10.0 - 25.0 mmol/L    Patient Temperature 37.0 degrees Celsius    FiO2 40 %   BLOOD GAS VENOUS FULL PANEL   Result Value Ref Range    POCT pH, Venous 7.33 7.33 - 7.43 pH    POCT pCO2, Venous 50 41 - 51 mm Hg    POCT pO2, Venous 48 (H) 35 - 45 mm Hg    POCT SO2, Venous 72 45 - 75 %    POCT Oxy Hemoglobin, Venous 70.4 45.0 - 75.0 %    POCT Hematocrit Calculated, Venous 34.0 (L) 41.0 - 52.0 %    POCT Sodium, Venous 136 136 - 145 mmol/L    POCT Potassium, Venous 4.8 3.5 - 5.3 mmol/L    POCT Chloride, Venous 105 98 - 107 mmol/L    POCT Ionized Calicum, Venous 1.07 (L) 1.10 - 1.33 mmol/L    POCT Glucose, Venous 225 (H) 74 - 99 mg/dL    POCT Lactate, Venous 2.9 (H) 0.4 - 2.0 mmol/L    POCT Base Excess, Venous -0.1 -2.0 - 3.0 mmol/L    POCT HCO3 Calculated, Venous 26.4 (H) 22.0 - 26.0 mmol/L    POCT Hemoglobin, Venous 11.3 (L) 13.5 - 17.5 g/dL    POCT Anion Gap, Venous 9.0 (L) 10.0 - 25.0 mmol/L    Patient Temperature 37.0 degrees Celsius    FiO2 40 %      XR chest 1 view    Result Date: 11/23/2023  Interpreted By:  Alvin Johnston, STUDY: XR CHEST 1 VIEW;  11/23/2023 11:06 pm   INDICATION: Signs/Symptoms:SOB.   COMPARISON: 11/12/2023   ACCESSION NUMBER(S): [id number]   ORDERING CLINICIAN: KATIE COOK   FINDINGS: The cardiac silhouette is normal in size. Atherosclerotic calcification of the thoracic aorta. There is pulmonary emphysema. There stable irregular opacities compatible with calcified pleural plaques. There is basilar atelectasis. No significant pleural effusion. No pneumothorax.       Pulmonary emphysema and stable bilateral irregular dense opacities compatible with calcified pleural plaques.   MACRO: None   Signed by: Alvin Johnston 11/23/2023 11:36 PM Dictation workstation:   [id number]    CT head wo IV contrast    Result Date:  11/12/2023  Interpreted By:  Sabrina Ramirez, STUDY: CT HEAD WO IV CONTRAST;  11/12/2023 6:01 pm   INDICATION: Signs/Symptoms:confusion.   COMPARISON: 10/16/2023   ACCESSION NUMBER(S): [id number]   ORDERING CLINICIAN: CASSIDY MEREDITH   TECHNIQUE: Axial noncontrast CT images of the head.   FINDINGS: BRAIN PARENCHYMA: Extensive parenchymal volume loss, nonspecific white matter changes and calcifications of the carotid arteries. No mass effect or midline shift. Focus of encephalomalacia in the right cerebellar hemisphere and extending from the left middle cerebellar peduncle into the left cerebellar hemisphere, similar to prior imaging. Punctate calcifications within the right sulci similar to prior imaging.   HEMORRHAGE: No acute intracranial hemorrhage. VENTRICLES and EXTRA-AXIAL SPACES: Margin of the lateral and 3rd ventricles are again noted which may be seen with central white matter loss or normal pressure hydrocephalus.. Ex vacuo dilatation of the 4th ventricle likely related to prior infarct. EXTRACRANIAL SOFT TISSUES: Within normal limits. PARANASAL SINUSES/MASTOIDS: The visualized paranasal sinuses and mastoid air cells are aerated. CALVARIUM: No depressed skull fracture. No destructive osseous lesion.   OTHER FINDINGS: None.       No acute intracranial hemorrhage or mass effect.   Chronic white matter changes and sequela of remote posterior fossa infarcts again noted.   Ventricular prominence again noted.   MACRO: None.   Signed by: Sabrina Ramirez 11/12/2023 6:33 PM Dictation workstation:   [id number]    XR chest 1 view    Result Date: 11/12/2023  Interpreted By:  Bhavesh Barros, STUDY: XR CHEST 1 VIEW;  11/12/2023 4:33 pm   INDICATION: Signs/Symptoms:cough, sob.   COMPARISON: 11/07/2023.   ACCESSION NUMBER(S): [id number]   ORDERING CLINICIAN: CASSIDY MEREDITH   FINDINGS: No consolidation. No pleural effusion or pneumothorax. Hyperinflated lungs suggestive of COPD. Right-sided pleural calcifications  unchanged. Left pleural calcification also present. Asymmetric elevation of the left hemidiaphragm. Atherosclerosis. Normal heart size. No acute osseous abnormality.       No acute cardiopulmonary abnormality.   Signed by: Bhavesh Barros 11/12/2023 4:45 PM Dictation workstation:   [id number]    CT angio chest for pulmonary embolism    Result Date: 11/7/2023  Interpreted By:  Ramana Ortega, STUDY: CT ANGIO CHEST FOR PULMONARY EMBOLISM; 11/7/2023 1:10 pm   INDICATION: Signs/Symptoms:SOB.   COMPARISON: CT chest dated 10/16/2023.   ACCESSION NUMBER(S): [id number]   ORDERING CLINICIAN: TRISHA LANDON   TECHNIQUE: Contiguous axial CT images were obtained through the chest at 2 mm slice thickness following administration of intravenous contrast utilizing pulmonary artery bolus tracking. 50 cc of Omnipaque 350 was administered. The images were then reconstructed in the coronal and sagittal planes. MIP images were created and reviewed.   FINDINGS: POTENTIAL LIMITATIONS OF THE STUDY: None   HEART and VESSELS: There is dense opacification of the pulmonary arterial system. No intraluminal filling defect is seen within the pulmonary arteries to suggest acute pulmonary embolus.   The heart is within normal limits for size. No pericardial effusion is identified. Dense atherosclerotic calcifications are seen in the coronary arteries. Mild aortic valve calcifications are present.   Moderate atherosclerotic calcifications are seen in the aortic arch and descending thoracic aorta , including at the origins of the arch vessels. The thoracic aorta is within normal limits for course and caliber, without evidence of aneurysm.   MEDIASTINUM and SHANTA, LOWER NECK AND AXILLA: Evaluation of the visualized neck base demonstrates no gross mass or lymphadenopathy.   There is no gross axillary, hilar or mediastinal lymphadenopathy identified.   LUNGS and AIRWAYS: The trachea and central airways are grossly patent without evidence of  endobronchial lesion.   Mild-to-moderate emphysematous changes are seen in the lungs bilaterally. Focal airspace consolidation is seen in the posteromedial aspect of the left mid lung, similar to the prior study, and may represent scarring/chronic atelectasis. Dense pleural calcifications are seen in the right lung. No discrete pulmonary nodules or masses are seen, though evaluation is limited within the regions of airspace consolidation..   UPPER ABDOMEN: Evaluation of the visualized upper abdomen demonstrates no discrete mass or lymphadenopathy.   CHEST WALL and OSSEOUS STRUCTURES: There is no evidence of acute fracture identified. Multilevel degenerative changes are seen in the thoracic spine.       1. No evidence of acute pulmonary embolus. 2. Left lower lobe airspace consolidation, most consistent with scarring/chronic atelectasis. 3. Emphysematous changes in the lungs bilaterally. 4. Extensive pleural calcifications in the right chest.   MACRO: None.     Signed by: Ramana Ortega 11/7/2023 1:30 PM Dictation workstation:   [id number]    XR chest 2 views    Result Date: 11/7/2023  Interpreted By:  Aretha Skelton, STUDY: XR CHEST 2 VIEWS;  11/7/2023 1:11 pm   INDICATION: Signs/Symptoms:cough.   COMPARISON: 10/16/2023   ACCESSION NUMBER(S): [id number]   ORDERING CLINICIAN: TRISHA LANDON   FINDINGS: Prominent calcified pleural plaques are seen. Emphysematous changes are noted. Elevation of the left hemidiaphragm is again seen, which is unchanged. The heart is not enlarged. No consolidation, pleural effusion pneumothorax is noted.       Chronic lung changes.     MACRO: None   Signed by: Aretha Skelton 11/7/2023 1:16 PM Dictation workstation:   [id number]       Aaron Pérez D.O.

## 2023-11-24 NOTE — ED PROVIDER NOTES
HPI   Chief Complaint   Patient presents with    Shortness of Breath     SOB that started yesterday       Patient presents emergency department for shortness of breath.  Shortness of breath started tonight.  Patient wears oxygen at home but does not recall how many liters.  States has had episodes of this before.  Patient has never required BiPAP or CPAP for his exacerbations.  Patient used his inhaler earlier today which did seem to help with his symptoms.  Patient denies any fever, sick contacts, chest pain, nausea, exertional symptoms.                          [place] Coma Scale Score: 15                  Patient History   Past Medical History:   Diagnosis Date    COPD (chronic obstructive pulmonary disease) (CMS/[facility])     Personal history of other infectious and parasitic diseases 11/26/2019    History of Clostridioides difficile colitis    Personal history of other specified conditions 09/27/2019    History of urinary incontinence    Personal history of other specified conditions 09/27/2019    History of elevated prostate specific antigen (PSA)     Past Surgical History:   Procedure Laterality Date    CT ANGIO NECK  4/14/2023    CT NECK ANGIO W AND WO IV CONTRAST POR CT    OTHER SURGICAL HISTORY  04/27/2018    Wrist Surgery     Family History   Problem Relation Name Age of Onset    No Known Problems Mother      No Known Problems Father       Social History     Tobacco Use    Smoking status: Former     Types: Cigarettes    Smokeless tobacco: Never   Vaping Use    Vaping Use: Never used   Substance Use Topics    Alcohol use: Never    Drug use: Never       Physical Exam   ED Triage Vitals [11/23/23 2149]   Temp Heart Rate Resp BP   36.4 °C (97.5 °F) 93 18 141/74      SpO2 Temp src Heart Rate Source Patient Position   -- -- -- --      BP Location FiO2 (%)     -- --       Physical Exam  Vitals and nursing note reviewed.   Constitutional:       General: He is not in acute distress.     Appearance: He is  well-developed.   HENT:      Head: Normocephalic and atraumatic.      Right Ear: External ear normal.      Left Ear: External ear normal.      Mouth/Throat:      Mouth: Mucous membranes are moist.      Pharynx: Oropharynx is clear.   Eyes:      Extraocular Movements: Extraocular movements intact.      Conjunctiva/sclera: Conjunctivae normal.   Neck:      Trachea: Trachea normal.   Cardiovascular:      Rate and Rhythm: Normal rate.   Pulmonary:      Effort: Pulmonary effort is normal. Tachypnea present. No respiratory distress.      Breath sounds: Decreased breath sounds present.   Abdominal:      General: Bowel sounds are normal.      Palpations: Abdomen is soft.      Tenderness: There is no abdominal tenderness.   Musculoskeletal:         General: No swelling or tenderness.      Cervical back: No tenderness.   Skin:     General: Skin is warm and dry.      Capillary Refill: Capillary refill takes less than 2 seconds.   Neurological:      General: No focal deficit present.      Mental Status: He is alert and oriented to person, place, and time.   Psychiatric:         Mood and Affect: Mood normal.         Thought Content: Thought content does not include homicidal or suicidal ideation.         ED Course & MDM   ED Course as of 01/10/24 1612   u Nov 23, 2023 2322 Blood Gas Venous Full Panel(!!)  Venous blood gas shows acute hypercapnic respiratory failure with a pH of 7.25 and pCO2 of 70.  Respiratory was paged. []   2323 Coronavirus 2019, PCR(!): Detected  Patient is COVID-positive.  Patient ordered Decadron and remdesivir. []   Fri Nov 24, 2023   0039 IMS Paged []   0050 Troponin I, High Sensitivity(!): 26  Repeat troponins 26. []   0051 Chest x-ray only shows chronic changes.  Patient accepted for admission to the hospital for his acute on chronic hypoxic and hypercarbic respiratory failure and COVID. []      ED Course User Index  [] Barrie Osorio MD         Diagnoses as of 01/10/24 1612   COVID-19    Acute on chronic respiratory failure with hypoxia and hypercapnia (CMS/HCC)   Acute respiratory failure due to COVID-19 (CMS/HCC)       Medical Decision Making  Patient presents with shortness of breath starting today. Available chart reviewed. On initial assessment the patient was found non-toxic, no acute distress, vitals hemodynamically stable and afebrile. Initial concern for MI, ACS, pneumonia, pneumothorax, COPD exacerbation.  Patient given 2 DuoNebs, Solu-Medrol here in the emergency department. Venous blood gas shows respiratory acidemia with a pH of 7.5 pCO2 of 70.  Patient placed on BiPAP.  .  CBC shows leukocytosis 21 with left shift, no anemia, no thrombocytopenia.  Metabolic panel shows no electrolyte abnormalities, normal kidney and liver function.  BNP slightly elevated at 157 and troponins elevated at 21.  Chest x-ray shows pulmonary emphysema and stable bilateral irregular density opacities.  Viral testing show patient is COVID-positive Patient given Decadron.  Patient will need admission for acute on chronic hypoxic and hypercarbic respiratory failure    Amount and/or Complexity of Data Reviewed  ECG/medicine tests: independent interpretation performed.     Details: EKG obtained at 2153 shows normal sinus rhythm, rate 98, , QTc 413, wandering baseline in V6, no obvious ST segment or T wave changes        Procedure  Critical Care    Performed by: Barrie Osorio MD  Authorized by: Barry Faustin MD    Critical care provider statement:     Critical care time (minutes):  30    Critical care was necessary to treat or prevent imminent or life-threatening deterioration of the following conditions:  Respiratory failure    Critical care was time spent personally by me on the following activities:  Development of treatment plan with patient or surrogate, evaluation of patient's response to treatment, examination of patient, re-evaluation of patient's condition, pulse oximetry, ordering and review of  radiographic studies, ordering and review of laboratory studies and ordering and performing treatments and interventions       Barrie Osorio MD  01/10/24 1819

## 2023-11-25 LAB
GLUCOSE BLD MANUAL STRIP-MCNC: 146 MG/DL (ref 74–99)
GLUCOSE BLD MANUAL STRIP-MCNC: 146 MG/DL (ref 74–99)
GLUCOSE BLD MANUAL STRIP-MCNC: 149 MG/DL (ref 74–99)
GLUCOSE BLD MANUAL STRIP-MCNC: 151 MG/DL (ref 74–99)
GLUCOSE BLD MANUAL STRIP-MCNC: 152 MG/DL (ref 74–99)

## 2023-11-25 PROCEDURE — XW033E5 INTRODUCTION OF REMDESIVIR ANTI-INFECTIVE INTO PERIPHERAL VEIN, PERCUTANEOUS APPROACH, NEW TECHNOLOGY GROUP 5: ICD-10-PCS | Performed by: INTERNAL MEDICINE

## 2023-11-25 PROCEDURE — 94660 CPAP INITIATION&MGMT: CPT

## 2023-11-25 PROCEDURE — 99233 SBSQ HOSP IP/OBS HIGH 50: CPT | Performed by: INTERNAL MEDICINE

## 2023-11-25 PROCEDURE — 2500000004 HC RX 250 GENERAL PHARMACY W/ HCPCS (ALT 636 FOR OP/ED): Performed by: INTERNAL MEDICINE

## 2023-11-25 PROCEDURE — 82947 ASSAY GLUCOSE BLOOD QUANT: CPT

## 2023-11-25 PROCEDURE — 1100000001 HC PRIVATE ROOM DAILY

## 2023-11-25 PROCEDURE — 96372 THER/PROPH/DIAG INJ SC/IM: CPT | Performed by: INTERNAL MEDICINE

## 2023-11-25 PROCEDURE — 5A09557 ASSISTANCE WITH RESPIRATORY VENTILATION, GREATER THAN 96 CONSECUTIVE HOURS, CONTINUOUS POSITIVE AIRWAY PRESSURE: ICD-10-PCS | Performed by: INTERNAL MEDICINE

## 2023-11-25 PROCEDURE — 2500000005 HC RX 250 GENERAL PHARMACY W/O HCPCS: Performed by: INTERNAL MEDICINE

## 2023-11-25 RX ADMIN — DEXAMETHASONE 6 MG: 6 TABLET ORAL at 11:22

## 2023-11-25 RX ADMIN — HEPARIN SODIUM 5000 UNITS: 5000 INJECTION INTRAVENOUS; SUBCUTANEOUS at 18:46

## 2023-11-25 RX ADMIN — Medication 100 MG: at 05:01

## 2023-11-25 RX ADMIN — PIPERACILLIN SODIUM AND TAZOBACTAM SODIUM 3.38 G: 3; .375 INJECTION, SOLUTION INTRAVENOUS at 03:54

## 2023-11-25 RX ADMIN — HEPARIN SODIUM 5000 UNITS: 5000 INJECTION INTRAVENOUS; SUBCUTANEOUS at 11:23

## 2023-11-25 RX ADMIN — PIPERACILLIN SODIUM AND TAZOBACTAM SODIUM 3.38 G: 3; .375 INJECTION, SOLUTION INTRAVENOUS at 11:24

## 2023-11-25 RX ADMIN — HEPARIN SODIUM 5000 UNITS: 5000 INJECTION INTRAVENOUS; SUBCUTANEOUS at 03:54

## 2023-11-25 RX ADMIN — PIPERACILLIN SODIUM AND TAZOBACTAM SODIUM 3.38 G: 3; .375 INJECTION, SOLUTION INTRAVENOUS at 18:50

## 2023-11-25 ASSESSMENT — PAIN - FUNCTIONAL ASSESSMENT
PAIN_FUNCTIONAL_ASSESSMENT: 0-10

## 2023-11-25 ASSESSMENT — COGNITIVE AND FUNCTIONAL STATUS - GENERAL
TURNING FROM BACK TO SIDE WHILE IN FLAT BAD: A LITTLE
WALKING IN HOSPITAL ROOM: TOTAL
DRESSING REGULAR LOWER BODY CLOTHING: A LOT
TOILETING: TOTAL
MOVING FROM LYING ON BACK TO SITTING ON SIDE OF FLAT BED WITH BEDRAILS: A LITTLE
MOBILITY SCORE: 12
DRESSING REGULAR UPPER BODY CLOTHING: A LITTLE
EATING MEALS: A LITTLE
PERSONAL GROOMING: A LITTLE
HELP NEEDED FOR BATHING: A LOT
MOVING TO AND FROM BED TO CHAIR: A LOT
STANDING UP FROM CHAIR USING ARMS: A LOT
DAILY ACTIVITIY SCORE: 14
CLIMB 3 TO 5 STEPS WITH RAILING: TOTAL

## 2023-11-25 ASSESSMENT — PAIN SCALES - GENERAL
PAINLEVEL_OUTOF10: 0 - NO PAIN

## 2023-11-25 NOTE — CARE PLAN
The patient's goals for the shift include    Problem: Pain - Adult  Goal: Verbalizes/displays adequate comfort level or baseline comfort level  Outcome: Progressing     Problem: Safety - Adult  Goal: Free from fall injury  Outcome: Progressing     Problem: Chronic Conditions and Co-morbidities  Goal: Patient's chronic conditions and co-morbidity symptoms are monitored and maintained or improved  Outcome: Progressing     Problem: Skin  Goal: Decreased wound size/increased tissue granulation at next dressing change  Outcome: Progressing  Goal: Participates in plan/prevention/treatment measures  Outcome: Progressing  Goal: Prevent/manage excess moisture  Outcome: Progressing  Goal: Prevent/minimize sheer/friction injuries  Outcome: Progressing  Goal: Promote/optimize nutrition  Outcome: Progressing  Goal: Promote skin healing  Outcome: Progressing     Problem: Diabetes  Goal: Achieve decreasing blood glucose levels by end of shift  Outcome: Progressing  Goal: Increase stability of blood glucose readings by end of shift  Outcome: Progressing  Goal: Decrease in ketones present in urine by end of shift  Outcome: Progressing  Goal: Maintain electrolyte levels within acceptable range throughout shift  Outcome: Progressing  Goal: Maintain glucose levels >70mg/dl to <250mg/dl throughout shift  Outcome: Progressing  Goal: No changes in neurological exam by end of shift  Outcome: Progressing  Goal: Learn about and adhere to nutrition recommendations by end of shift  Outcome: Progressing  Goal: Vital signs within normal range for age by end of shift  Outcome: Progressing  Goal: Increase self care and/or family involovement by end of shift  Outcome: Progressing    Problem: Fall/Injury  Goal: Not fall by end of shift  Outcome: Progressing  Goal: Be free from injury by end of the shift  Outcome: Progressing  Goal: Verbalize understanding of personal risk factors for fall in the hospital  Outcome: Progressing  Goal: Verbalize  understanding of risk factor reduction measures to prevent injury from fall in the home  Outcome: Progressing  Goal: Use assistive devices by end of the shift  Outcome: Progressing  Goal: Pace activities to prevent fatigue by end of the shift  Outcome: Progressing          The clinical goals for the shift include patient SPO2 will maintain >90% on 3L NC

## 2023-11-25 NOTE — PROGRESS NOTES
Hai Laboy is a 78 y.o. male on day 1 of admission presenting with Acute respiratory failure due to COVID-19 (CMS/[facility]).      Subjective      History Of Present Illness  Hai Laboy is a 78 y.o. male presenting with the above.  Subsequent testing here in the emergency room showed the patient has COVID-19.  His x-ray does appear that he has pneumonia most likely related to this.  When I seen the patient he was somewhat altered and cannot give a detailed history.  He was on BiPAP, and he was very weak.  Past medical history is consistent with COPD, hypertension, hyperlipidemia, type 2 diabetes mellitus, prostate cancer, and previous stroke.  According to reports the patient was much more alert on arrival to the ER, appears to have decompensated to some extent.  Other than being confused she is not distressed.  Past medical history is consistent with COPD, essential hypertension, hyperlipidemia, type 2 diabetes mellitus, prostate cancer, previous stroke, and some type of nonspecific parasitic infection which I could not find any details on.  No further history could be obtained.  He presents at this time for further evaluation.     Past Medical History  COPD  Essential hypertension  Hyperlipidemia  Type 2 diabetes mellitus  Prostate cancer  Stroke  Parasitic infection        Surgical History  He has a past surgical history that includes Other surgical history (04/27/2018) and CT angio neck (4/14/2023).     11/25: No acute events overnight.  Patient feels weak.  Short of breath but overall feels a lot better.  He denies any fevers, chills, nausea or vomiting.  Continue remdesivir, Decadron, broad-spectrum antibiotics.  Wean down oxygen.  Transfer to medical floor.  Follow cultures.  Patient require at least another 24 to 48 hours inpatient service.      Objective     Last Recorded Vitals  /65 (BP Location: Right arm, Patient Position: Lying)   Pulse 62   Temp 36.4 °C (97.5 °F)   Resp 20   Wt 57.2 kg (126  lb 1.7 oz)   SpO2 97%   Intake/Output last 3 Shifts:    Intake/Output Summary (Last 24 hours) at 11/25/2023 1448  Last data filed at 11/25/2023 0424  Gross per 24 hour   Intake 150 ml   Output --   Net 150 ml       Admission Weight  Weight: 62.5 kg (137 lb 12.6 oz) (11/23/23 2149)    Daily Weight  11/25/23 : 57.2 kg (126 lb 1.7 oz)    Image Results  XR chest 1 view  Narrative: Interpreted By:  Alvin Johnston,   STUDY:  XR CHEST 1 VIEW;  11/23/2023 11:06 pm      INDICATION:  Signs/Symptoms:SOB.      COMPARISON:  11/12/2023      ACCESSION NUMBER(S):  [id number]      ORDERING CLINICIAN:  KATIE COOK      FINDINGS:  The cardiac silhouette is normal in size. Atherosclerotic  calcification of the thoracic aorta. There is pulmonary emphysema.  There stable irregular opacities compatible with calcified pleural  plaques. There is basilar atelectasis. No significant pleural  effusion. No pneumothorax.      Impression: Pulmonary emphysema and stable bilateral irregular dense opacities  compatible with calcified pleural plaques.      MACRO:  None      Signed by: Alvin Johnston 11/23/2023 11:36 PM  Dictation workstation:   [id number]      Physical Exam  CONSTITUTIONAL -elderly looking male, comfortable, frail  SKIN - normal skin color ,warm  HEAD - no trauma, normocephalic  EYES - pupils are equal and reactive to light  CHEST - clear to auscultation, no wheezing, no crackles and no rales, good effort  CARDIAC - regular rate and regular rhythm, no murmur  ABDOMEN - no organomegaly, soft, nontender, nondistended, normal bowel sounds, no guarding/rebound/rigidity  EXTREMITIES - no edema, no deformities  NEUROLOGICAL - alert, oriented x3 and no acute focal signs  PSYCHIATRIC - alert, pleasant and cordial, age-appropriate    Relevant Results               Assessment/Plan        Acute hypoxic respiratory failure secondary to COVID-19 pneumonia with sepsis present on admission  Continue broad-spectrum antibiotics  Continue  remdesivir  Continue Decadron  Follow cultures  Wean down oxygen    COPD  Acute metabolic encephalopathy secondary to sepsis  Type 2 diabetes mellitus with diabetic nephropathy end-stage kidney disease stage III  Essential hypertension          Principal Problem:    Acute respiratory failure due to COVID-19 (CMS/HCC)          Malnutrition          I agree with the dietitian's malnutrition diagnosis.         Barry Faustin MD

## 2023-11-26 LAB
ALBUMIN SERPL BCP-MCNC: 2.7 G/DL (ref 3.4–5)
ALP SERPL-CCNC: 46 U/L (ref 33–136)
ALT SERPL W P-5'-P-CCNC: 23 U/L (ref 10–52)
ANION GAP SERPL CALC-SCNC: 9 MMOL/L (ref 10–20)
AST SERPL W P-5'-P-CCNC: 22 U/L (ref 9–39)
BASOPHILS # BLD AUTO: 0.01 X10*3/UL (ref 0–0.1)
BASOPHILS NFR BLD AUTO: 0.1 %
BILIRUB SERPL-MCNC: 0.6 MG/DL (ref 0–1.2)
BUN SERPL-MCNC: 36 MG/DL (ref 6–23)
CALCIUM SERPL-MCNC: 7.5 MG/DL (ref 8.6–10.3)
CHLORIDE SERPL-SCNC: 108 MMOL/L (ref 98–107)
CO2 SERPL-SCNC: 27 MMOL/L (ref 21–32)
CREAT SERPL-MCNC: 0.92 MG/DL (ref 0.5–1.3)
EOSINOPHIL # BLD AUTO: 0 X10*3/UL (ref 0–0.4)
EOSINOPHIL NFR BLD AUTO: 0 %
ERYTHROCYTE [DISTWIDTH] IN BLOOD BY AUTOMATED COUNT: 14.7 % (ref 11.5–14.5)
GFR SERPL CREATININE-BSD FRML MDRD: 84 ML/MIN/1.73M*2
GLUCOSE BLD MANUAL STRIP-MCNC: 128 MG/DL (ref 74–99)
GLUCOSE BLD MANUAL STRIP-MCNC: 133 MG/DL (ref 74–99)
GLUCOSE BLD MANUAL STRIP-MCNC: 158 MG/DL (ref 74–99)
GLUCOSE SERPL-MCNC: 145 MG/DL (ref 74–99)
HCT VFR BLD AUTO: 28.3 % (ref 41–52)
HGB BLD-MCNC: 9.6 G/DL (ref 13.5–17.5)
IMM GRANULOCYTES # BLD AUTO: 0.08 X10*3/UL (ref 0–0.5)
IMM GRANULOCYTES NFR BLD AUTO: 0.6 % (ref 0–0.9)
LYMPHOCYTES # BLD AUTO: 0.47 X10*3/UL (ref 0.8–3)
LYMPHOCYTES NFR BLD AUTO: 3.8 %
MCH RBC QN AUTO: 32 PG (ref 26–34)
MCHC RBC AUTO-ENTMCNC: 33.9 G/DL (ref 32–36)
MCV RBC AUTO: 94 FL (ref 80–100)
MONOCYTES # BLD AUTO: 0.64 X10*3/UL (ref 0.05–0.8)
MONOCYTES NFR BLD AUTO: 5.2 %
NEUTROPHILS # BLD AUTO: 11.19 X10*3/UL (ref 1.6–5.5)
NEUTROPHILS NFR BLD AUTO: 90.3 %
NRBC BLD-RTO: 0 /100 WBCS (ref 0–0)
PLATELET # BLD AUTO: 134 X10*3/UL (ref 150–450)
POTASSIUM SERPL-SCNC: 5.3 MMOL/L (ref 3.5–5.3)
PROT SERPL-MCNC: 4.6 G/DL (ref 6.4–8.2)
RBC # BLD AUTO: 3 X10*6/UL (ref 4.5–5.9)
SODIUM SERPL-SCNC: 139 MMOL/L (ref 136–145)
WBC # BLD AUTO: 12.4 X10*3/UL (ref 4.4–11.3)

## 2023-11-26 PROCEDURE — 87081 CULTURE SCREEN ONLY: CPT | Mod: PORLAB | Performed by: STUDENT IN AN ORGANIZED HEALTH CARE EDUCATION/TRAINING PROGRAM

## 2023-11-26 PROCEDURE — 36415 COLL VENOUS BLD VENIPUNCTURE: CPT | Performed by: INTERNAL MEDICINE

## 2023-11-26 PROCEDURE — 85025 COMPLETE CBC W/AUTO DIFF WBC: CPT | Performed by: INTERNAL MEDICINE

## 2023-11-26 PROCEDURE — 2500000005 HC RX 250 GENERAL PHARMACY W/O HCPCS: Performed by: INTERNAL MEDICINE

## 2023-11-26 PROCEDURE — 84075 ASSAY ALKALINE PHOSPHATASE: CPT | Performed by: INTERNAL MEDICINE

## 2023-11-26 PROCEDURE — 1100000001 HC PRIVATE ROOM DAILY

## 2023-11-26 PROCEDURE — 99233 SBSQ HOSP IP/OBS HIGH 50: CPT | Performed by: INTERNAL MEDICINE

## 2023-11-26 PROCEDURE — 2500000004 HC RX 250 GENERAL PHARMACY W/ HCPCS (ALT 636 FOR OP/ED): Performed by: INTERNAL MEDICINE

## 2023-11-26 PROCEDURE — 82947 ASSAY GLUCOSE BLOOD QUANT: CPT

## 2023-11-26 PROCEDURE — 96372 THER/PROPH/DIAG INJ SC/IM: CPT | Performed by: INTERNAL MEDICINE

## 2023-11-26 PROCEDURE — 94660 CPAP INITIATION&MGMT: CPT

## 2023-11-26 RX ORDER — SODIUM CHLORIDE 0.9 % (FLUSH) 0.9 %
SYRINGE (ML) INJECTION
Status: COMPLETED
Start: 2023-11-26 | End: 2023-11-26

## 2023-11-26 RX ADMIN — PIPERACILLIN SODIUM AND TAZOBACTAM SODIUM 3.38 G: 3; .375 INJECTION, SOLUTION INTRAVENOUS at 20:13

## 2023-11-26 RX ADMIN — Medication 10 ML: at 10:34

## 2023-11-26 RX ADMIN — Medication 100 MG: at 05:52

## 2023-11-26 RX ADMIN — PIPERACILLIN SODIUM AND TAZOBACTAM SODIUM 3.38 G: 3; .375 INJECTION, SOLUTION INTRAVENOUS at 10:23

## 2023-11-26 RX ADMIN — PIPERACILLIN SODIUM AND TAZOBACTAM SODIUM 3.38 G: 3; .375 INJECTION, SOLUTION INTRAVENOUS at 15:30

## 2023-11-26 RX ADMIN — PIPERACILLIN SODIUM AND TAZOBACTAM SODIUM 3.38 G: 3; .375 INJECTION, SOLUTION INTRAVENOUS at 01:12

## 2023-11-26 RX ADMIN — DEXAMETHASONE 6 MG: 6 TABLET ORAL at 10:23

## 2023-11-26 RX ADMIN — HEPARIN SODIUM 5000 UNITS: 5000 INJECTION INTRAVENOUS; SUBCUTANEOUS at 10:22

## 2023-11-26 ASSESSMENT — COGNITIVE AND FUNCTIONAL STATUS - GENERAL
EATING MEALS: A LITTLE
MOVING FROM LYING ON BACK TO SITTING ON SIDE OF FLAT BED WITH BEDRAILS: A LITTLE
TOILETING: A LOT
CLIMB 3 TO 5 STEPS WITH RAILING: TOTAL
WALKING IN HOSPITAL ROOM: TOTAL
TURNING FROM BACK TO SIDE WHILE IN FLAT BAD: A LITTLE
PERSONAL GROOMING: A LITTLE
STANDING UP FROM CHAIR USING ARMS: A LOT
DRESSING REGULAR LOWER BODY CLOTHING: A LOT
DAILY ACTIVITIY SCORE: 15
MOVING TO AND FROM BED TO CHAIR: A LOT
MOBILITY SCORE: 12
PERSONAL GROOMING: A LITTLE
DAILY ACTIVITIY SCORE: 15
WALKING IN HOSPITAL ROOM: TOTAL
EATING MEALS: A LITTLE
HELP NEEDED FOR BATHING: A LOT
TOILETING: A LOT
DRESSING REGULAR LOWER BODY CLOTHING: A LOT
HELP NEEDED FOR BATHING: A LOT
MOBILITY SCORE: 12
DRESSING REGULAR UPPER BODY CLOTHING: A LITTLE
STANDING UP FROM CHAIR USING ARMS: A LOT
MOVING FROM LYING ON BACK TO SITTING ON SIDE OF FLAT BED WITH BEDRAILS: A LITTLE
MOVING TO AND FROM BED TO CHAIR: A LOT
DRESSING REGULAR UPPER BODY CLOTHING: A LITTLE
CLIMB 3 TO 5 STEPS WITH RAILING: TOTAL
TURNING FROM BACK TO SIDE WHILE IN FLAT BAD: A LITTLE

## 2023-11-26 ASSESSMENT — PAIN - FUNCTIONAL ASSESSMENT: PAIN_FUNCTIONAL_ASSESSMENT: 0-10

## 2023-11-26 ASSESSMENT — PAIN SCALES - GENERAL: PAINLEVEL_OUTOF10: 0 - NO PAIN

## 2023-11-26 NOTE — PROGRESS NOTES
Hai Laboy is a 78 y.o. male on day 2 of admission presenting with Acute respiratory failure due to COVID-19 (CMS/[facility]).      Subjective      History Of Present Illness  Hai Laboy is a 78 y.o. male presenting with the above.  Subsequent testing here in the emergency room showed the patient has COVID-19.  His x-ray does appear that he has pneumonia most likely related to this.  When I seen the patient he was somewhat altered and cannot give a detailed history.  He was on BiPAP, and he was very weak.  Past medical history is consistent with COPD, hypertension, hyperlipidemia, type 2 diabetes mellitus, prostate cancer, and previous stroke.  According to reports the patient was much more alert on arrival to the ER, appears to have decompensated to some extent.  Other than being confused she is not distressed.  Past medical history is consistent with COPD, essential hypertension, hyperlipidemia, type 2 diabetes mellitus, prostate cancer, previous stroke, and some type of nonspecific parasitic infection which I could not find any details on.  No further history could be obtained.  He presents at this time for further evaluation.     Past Medical History  COPD  Essential hypertension  Hyperlipidemia  Type 2 diabetes mellitus  Prostate cancer  Stroke  Parasitic infection        Surgical History  He has a past surgical history that includes Other surgical history (04/27/2018) and CT angio neck (4/14/2023).     11/25: No acute events overnight.  Patient feels weak.  Short of breath but overall feels a lot better.  He denies any fevers, chills, nausea or vomiting.  Continue remdesivir, Decadron, broad-spectrum antibiotics.  Wean down oxygen.  Transfer to medical floor.  Follow cultures.  Patient require at least another 24 to 48 hours inpatient service.  11/26: No acute events overnight.  Patient still feels very weak.  He still short of breath.  He denies any cough.  He has poor appetite.  He denies any fevers, chills,  nausea or vomiting.  He is currently on room air.  ID recommendations appreciated.  Continue remdesivir, continue Decadron continue broad-spectrum antibiotics.  PT and OT.  Patient require at least another 24 hours of inpatient service.    Objective     Last Recorded Vitals  /70 (Patient Position: Lying)   Pulse 58   Temp 36.4 °C (97.5 °F)   Resp 20   Wt 57.2 kg (126 lb 1.7 oz)   SpO2 99%   Intake/Output last 3 Shifts:    Intake/Output Summary (Last 24 hours) at 11/26/2023 1758  Last data filed at 11/26/2023 0825  Gross per 24 hour   Intake 240 ml   Output 750 ml   Net -510 ml         Admission Weight  Weight: 62.5 kg (137 lb 12.6 oz) (11/23/23 2149)    Daily Weight  11/25/23 : 57.2 kg (126 lb 1.7 oz)    Image Results  XR chest 1 view  Narrative: Interpreted By:  Alvin Johnston,   STUDY:  XR CHEST 1 VIEW;  11/23/2023 11:06 pm      INDICATION:  Signs/Symptoms:SOB.      COMPARISON:  11/12/2023      ACCESSION NUMBER(S):  [id number]      ORDERING CLINICIAN:  KATIE COOK      FINDINGS:  The cardiac silhouette is normal in size. Atherosclerotic  calcification of the thoracic aorta. There is pulmonary emphysema.  There stable irregular opacities compatible with calcified pleural  plaques. There is basilar atelectasis. No significant pleural  effusion. No pneumothorax.      Impression: Pulmonary emphysema and stable bilateral irregular dense opacities  compatible with calcified pleural plaques.      MACRO:  None      Signed by: Alvin Johnston 11/23/2023 11:36 PM  Dictation workstation:   [id number]      Physical Exam  CONSTITUTIONAL -elderly looking male, looks unwell although in no distress  SKIN - normal skin color ,warm  HEAD - no trauma, normocephalic  EYES - pupils are equal and reactive to light  CHEST - clear to auscultation, no wheezing, no crackles and no rales, good effort  CARDIAC - regular rate and regular rhythm, no murmur  ABDOMEN - no organomegaly, soft, nontender, nondistended, normal bowel sounds,  no guarding/rebound/rigidity  EXTREMITIES - no edema, no deformities  NEUROLOGICAL - alert, oriented x3 and no acute focal signs  PSYCHIATRIC - alert, pleasant and cordial, age-appropriate    Relevant Results               Assessment/Plan        Acute hypoxic respiratory failure secondary to COVID-19 pneumonia with sepsis present on admission  Continue broad-spectrum antibiotics  Continue remdesivir  Continue Decadron  Follow cultures  Wean down oxygen  11/26: ID recommendation appreciated.  Continue broad-spectrum antibiotics, Decadron, remdesivir.  PT and OT.    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          Principal Problem:    Acute respiratory failure due to COVID-19 (CMS/[facility])          Malnutrition          I agree with the dietitian's malnutrition diagnosis.         Barry Faustin MD

## 2023-11-26 NOTE — CARE PLAN
The patient's goals for the shift include      The clinical goals for the shift include pt will maintain a pulse ox of 90 or greater    Over the shift, the patient did not make progress toward the following goals. Barriers to progression include . Recommendations to address these barriers include   Problem: Pain - Adult  Goal: Verbalizes/displays adequate comfort level or baseline comfort level  Outcome: Progressing     Problem: Safety - Adult  Goal: Free from fall injury  Outcome: Progressing     Problem: Discharge Planning  Goal: Discharge to home or other facility with appropriate resources  Outcome: Progressing     Problem: Chronic Conditions and Co-morbidities  Goal: Patient's chronic conditions and co-morbidity symptoms are monitored and maintained or improved  Outcome: Progressing     Problem: Skin  Goal: Decreased wound size/increased tissue granulation at next dressing change  Outcome: Progressing  Goal: Participates in plan/prevention/treatment measures  Outcome: Progressing  Goal: Prevent/manage excess moisture  Outcome: Progressing  Goal: Prevent/minimize sheer/friction injuries  Outcome: Progressing  Flowsheets (Taken 11/26/2023 0675)  Prevent/minimize sheer/friction injuries:   HOB 30 degrees or less   Turn/reposition every 2 hours/use positioning/transfer devices  Goal: Promote/optimize nutrition  Outcome: Progressing  Goal: Promote skin healing  Outcome: Progressing     Problem: Diabetes  Goal: Achieve decreasing blood glucose levels by end of shift  Outcome: Progressing  Goal: Increase stability of blood glucose readings by end of shift  Outcome: Progressing  Goal: Decrease in ketones present in urine by end of shift  Outcome: Progressing  Goal: Maintain electrolyte levels within acceptable range throughout shift  Outcome: Progressing  Goal: Maintain glucose levels >70mg/dl to <250mg/dl throughout shift  Outcome: Progressing  Goal: No changes in neurological exam by end of shift  Outcome:  Progressing  Goal: Learn about and adhere to nutrition recommendations by end of shift  Outcome: Progressing  Goal: Vital signs within normal range for age by end of shift  Outcome: Progressing  Goal: Increase self care and/or family involovement by end of shift  Outcome: Progressing  Goal: Receive DSME education by end of shift  Outcome: Progressing     Problem: Fall/Injury  Goal: Not fall by end of shift  Outcome: Progressing  Goal: Be free from injury by end of the shift  Outcome: Progressing  Goal: Verbalize understanding of personal risk factors for fall in the hospital  Outcome: Progressing  Goal: Verbalize understanding of risk factor reduction measures to prevent injury from fall in the home  Outcome: Progressing  Goal: Use assistive devices by end of the shift  Outcome: Progressing  Goal: Pace activities to prevent fatigue by end of the shift  Outcome: Progressing   .

## 2023-11-26 NOTE — CONSULTS
Reason For Consult  covid    History Of Present Illness  Hai Laboy is a 78 y.o. male presenting with the above.  Subsequent testing here in the emergency room showed the patient has COVID-19.  His x-ray does appear that he has pneumonia most likely related to this.  When I seen the patient he was somewhat altered and cannot give a detailed history.  He was on BiPAP, and he was very weak.  Past medical history is consistent with COPD, hypertension, hyperlipidemia, type 2 diabetes mellitus, prostate cancer, and previous stroke.  According to reports the patient was much more alert on arrival to the ER, appears to have decompensated to some extent.  Other than being confused she is not distressed.  Past medical history is consistent with COPD, essential hypertension, hyperlipidemia, type 2 diabetes mellitus, prostate cancer, previous stroke, and some type of nonspecific parasitic infection which I could not find any details on.  No further history could be obtained.  He presents at this time for further evaluation.      Past Medical History  He has a past medical history of COPD (chronic obstructive pulmonary disease) (CMS/[facility]), Personal history of other infectious and parasitic diseases (11/26/2019), Personal history of other specified conditions (09/27/2019), and Personal history of other specified conditions (09/27/2019).    Surgical History  He has a past surgical history that includes Other surgical history (04/27/2018) and CT angio neck (4/14/2023).     Social History     Occupational History    Not on file   Tobacco Use    Smoking status: Former     Types: Cigarettes    Smokeless tobacco: Never   Vaping Use    Vaping Use: Never used   Substance and Sexual Activity    Alcohol use: Never    Drug use: Never    Sexual activity: Not on file     Travel History   Travel since 10/26/23    No documented travel since 10/26/23         Family History  Family History   Problem Relation Name Age of Onset    No Known  Problems Mother      No Known Problems Father       Allergies  Patient has no known allergies.       There is no immunization history on file for this patient.  Medications  Home medications:  Medications Prior to Admission   Medication Sig Dispense Refill Last Dose    clopidogrel (Plavix) 75 mg tablet TAKE 1 TABLET BY MOUTH ONCE DAILY. 30 tablet 6     albuterol 2.5 mg /3 mL (0.083 %) nebulizer solution Inhale 3 mL every 6 hours.       albuterol 90 mcg/actuation inhaler Inhale every 6 hours if needed.       aspirin 81 mg EC tablet Take 1 tablet (81 mg) by mouth once daily.       atorvastatin (Lipitor) 10 mg tablet Take 1 tablet (10 mg) by mouth once daily.       [] doxycycline (Vibramycin) 100 mg capsule Take 1 capsule (100 mg) by mouth 2 times a day for 2 days. Take with at least 8 ounces (large glass) of water, do not lie down for 30 minutes after 4 capsule 0     Gemtesa 75 mg tablet Take 1 tablet (75 mg) by mouth once daily. 90 tablet 3     guaiFENesin (Mucinex) 1,200 mg tablet extended release 12hr Take 1 tablet (1,200 mg) by mouth 2 times a day for 14 days. Do not crush, chew, or split. 28 tablet 0     lisinopril 40 mg tablet Take 1 tablet (40 mg) by mouth once daily.       multivitamin tablet Take 1 tablet by mouth once daily.       polyethylene glycol (Glycolax, Miralax) packet Take 17 g by mouth once daily. Do not start before 2023. 20 packet 1     predniSONE (Deltasone) 10 mg tablet Take 4 tablets (40 mg) by mouth once daily for 3 days, THEN 3 tablets (30 mg) once daily for 3 days, THEN 2 tablets (20 mg) once daily for 3 days, THEN 1 tablet (10 mg) once daily for 3 days. 30 tablet 0     Trelegy Ellipta 100-62.5-25 mcg blister with device Inhale.        Current medications:  Scheduled medications  dexAMETHasone, 6 mg, oral, Daily   Or  dexAMETHasone, 6 mg, oral, Daily   Or  dexAMETHasone, 6 mg, intravenous, Daily  heparin (porcine), 5,000 Units, subcutaneous, q8h  insulin regular, 0-15  Units, subcutaneous, TID with meals  piperacillin-tazobactam, 3.375 g, intravenous, q6h  polyethylene glycol, 17 g, oral, Daily  remdesivir, 100 mg, intravenous, q24h      Continuous medications     PRN medications  PRN medications: dextrose 10 % in water (D10W), dextrose, glucagon, oxygen, oxygen    Review of Systems   As PER HPI    Objective  Range of Vitals (last 24 hours)  Heart Rate:  [62-75]   Temp:  [36.2 °C (97.2 °F)-36.7 °C (98.1 °F)]   Resp:  [16-24]   BP: (126-168)/(57-73)   SpO2:  [92 %-99 %]   Daily Weight  11/25/23 : 57.2 kg (126 lb 1.7 oz)    Body mass index is 19.75 kg/m².     Physical Exam   General: AAO*3  Skin: no rashes  Neck: supple  CVS: S1S2  Chest:CTAB  GI: soft, non tender  : no CVAT  Psych: alert,oriented  CNS: no FND    Relevant Results  Outside Hospital Results  No  Labs  Results from last 72 hours   Lab Units 11/26/23  0600 11/24/23  0243 11/23/23  2247   WBC AUTO x10*3/uL 12.4* 19.2* 21.4*   HEMOGLOBIN g/dL 9.6* 11.9* 14.3   HEMATOCRIT % 28.3* 36.0* 44.2   PLATELETS AUTO x10*3/uL 134* 169 215   NEUTROS PCT AUTO % 90.3 95.3 90.6   LYMPHS PCT AUTO % 3.8 0.6 2.7   MONOS PCT AUTO % 5.2 3.4 5.8   EOS PCT AUTO % 0.0 0.0 0.0     Results from last 72 hours   Lab Units 11/26/23  0600 11/24/23  0243 11/23/23  2247   SODIUM mmol/L 139 137 137   POTASSIUM mmol/L 5.3 4.6 4.7   CHLORIDE mmol/L 108* 105 103   CO2 mmol/L 27 25 28   BUN mg/dL 36* 39* 35*   CREATININE mg/dL 0.92 1.01 0.93   GLUCOSE mg/dL 145* 204* 163*   CALCIUM mg/dL 7.5* 7.4* 8.2*   ANION GAP mmol/L 9* 12 11   EGFR mL/min/1.73m*2 84 75 83     Results from last 72 hours   Lab Units 11/26/23  0600 11/23/23  2247   ALK PHOS U/L 46 84   BILIRUBIN TOTAL mg/dL 0.6 0.7   PROTEIN TOTAL g/dL 4.6* 6.5   ALT U/L 23 45   AST U/L 22 22   ALBUMIN g/dL 2.7* 4.2     Estimated Creatinine Clearance: 51.8 mL/min (by C-G formula based on SCr of 0.92 mg/dL).  C-Reactive Protein   Date Value Ref Range Status   10/17/2023 6.86 (H) <1.00 mg/dL Final     No  "results found for: \"HIV1X2\", \"HIVCONF\", \"PIYEBP8EH\"  Hepatitis C AB   Date Value Ref Range Status   10/17/2023 Nonreactive Nonreactive Final     Comment:     Results from patients taking biotin supplements or receiving high-dose biotin therapy should be interpreted with caution due to possible interference with this test. Providers may contact their local laboratory for further information.     Microbiology  Susceptibility data from last 90 days.  Collected Specimen Info Organism   10/16/23 Blood culture from Peripheral Venipuncture Staphylococcus epidermidis     Staphylococcus hominis        Assessment/Plan     COVID 19  pneumonia  COPD  HTN  HLD  DM  Stroke  Prostate cancer    Suggest:  Presenting with sepsis criteria also found positive for covid  Blood cx ngtd  Chest x ray reviewed. Patient is on room air  C/w remdesivir  C/w steroids   C/w zosyn   Trend wbc and temps          Ulises Silver MD  "

## 2023-11-27 LAB
ANION GAP SERPL CALC-SCNC: 9 MMOL/L (ref 10–20)
BASOPHILS # BLD AUTO: 0.01 X10*3/UL (ref 0–0.1)
BASOPHILS NFR BLD AUTO: 0.1 %
BUN SERPL-MCNC: 27 MG/DL (ref 6–23)
CALCIUM SERPL-MCNC: 7.7 MG/DL (ref 8.6–10.3)
CHLORIDE SERPL-SCNC: 105 MMOL/L (ref 98–107)
CO2 SERPL-SCNC: 27 MMOL/L (ref 21–32)
CREAT SERPL-MCNC: 0.86 MG/DL (ref 0.5–1.3)
CRP SERPL-MCNC: 2.56 MG/DL
EOSINOPHIL # BLD AUTO: 0 X10*3/UL (ref 0–0.4)
EOSINOPHIL NFR BLD AUTO: 0 %
ERYTHROCYTE [DISTWIDTH] IN BLOOD BY AUTOMATED COUNT: 14.6 % (ref 11.5–14.5)
GFR SERPL CREATININE-BSD FRML MDRD: 88 ML/MIN/1.73M*2
GLUCOSE BLD MANUAL STRIP-MCNC: 113 MG/DL (ref 74–99)
GLUCOSE BLD MANUAL STRIP-MCNC: 132 MG/DL (ref 74–99)
GLUCOSE BLD MANUAL STRIP-MCNC: 136 MG/DL (ref 74–99)
GLUCOSE BLD MANUAL STRIP-MCNC: 147 MG/DL (ref 74–99)
GLUCOSE BLD MANUAL STRIP-MCNC: 148 MG/DL (ref 74–99)
GLUCOSE BLD MANUAL STRIP-MCNC: 150 MG/DL (ref 74–99)
GLUCOSE SERPL-MCNC: 155 MG/DL (ref 74–99)
HCT VFR BLD AUTO: 30.6 % (ref 41–52)
HGB BLD-MCNC: 10.4 G/DL (ref 13.5–17.5)
IMM GRANULOCYTES # BLD AUTO: 0.06 X10*3/UL (ref 0–0.5)
IMM GRANULOCYTES NFR BLD AUTO: 0.6 % (ref 0–0.9)
LYMPHOCYTES # BLD AUTO: 0.48 X10*3/UL (ref 0.8–3)
LYMPHOCYTES NFR BLD AUTO: 4.8 %
MCH RBC QN AUTO: 31.5 PG (ref 26–34)
MCHC RBC AUTO-ENTMCNC: 34 G/DL (ref 32–36)
MCV RBC AUTO: 93 FL (ref 80–100)
MONOCYTES # BLD AUTO: 0.35 X10*3/UL (ref 0.05–0.8)
MONOCYTES NFR BLD AUTO: 3.5 %
NEUTROPHILS # BLD AUTO: 9.13 X10*3/UL (ref 1.6–5.5)
NEUTROPHILS NFR BLD AUTO: 91 %
NRBC BLD-RTO: 0 /100 WBCS (ref 0–0)
PLATELET # BLD AUTO: 150 X10*3/UL (ref 150–450)
POTASSIUM SERPL-SCNC: 4.9 MMOL/L (ref 3.5–5.3)
RBC # BLD AUTO: 3.3 X10*6/UL (ref 4.5–5.9)
SODIUM SERPL-SCNC: 136 MMOL/L (ref 136–145)
WBC # BLD AUTO: 10 X10*3/UL (ref 4.4–11.3)

## 2023-11-27 PROCEDURE — 86140 C-REACTIVE PROTEIN: CPT | Performed by: INTERNAL MEDICINE

## 2023-11-27 PROCEDURE — 36415 COLL VENOUS BLD VENIPUNCTURE: CPT | Performed by: INTERNAL MEDICINE

## 2023-11-27 PROCEDURE — 96372 THER/PROPH/DIAG INJ SC/IM: CPT | Performed by: INTERNAL MEDICINE

## 2023-11-27 PROCEDURE — 2500000004 HC RX 250 GENERAL PHARMACY W/ HCPCS (ALT 636 FOR OP/ED): Performed by: INTERNAL MEDICINE

## 2023-11-27 PROCEDURE — 94660 CPAP INITIATION&MGMT: CPT

## 2023-11-27 PROCEDURE — 82947 ASSAY GLUCOSE BLOOD QUANT: CPT

## 2023-11-27 PROCEDURE — 97166 OT EVAL MOD COMPLEX 45 MIN: CPT | Mod: GO

## 2023-11-27 PROCEDURE — 80048 BASIC METABOLIC PNL TOTAL CA: CPT | Performed by: INTERNAL MEDICINE

## 2023-11-27 PROCEDURE — 1100000001 HC PRIVATE ROOM DAILY

## 2023-11-27 PROCEDURE — 99233 SBSQ HOSP IP/OBS HIGH 50: CPT | Performed by: PHYSICIAN ASSISTANT

## 2023-11-27 PROCEDURE — 85025 COMPLETE CBC W/AUTO DIFF WBC: CPT | Performed by: INTERNAL MEDICINE

## 2023-11-27 PROCEDURE — 2500000005 HC RX 250 GENERAL PHARMACY W/O HCPCS: Performed by: INTERNAL MEDICINE

## 2023-11-27 PROCEDURE — 97162 PT EVAL MOD COMPLEX 30 MIN: CPT | Mod: GP

## 2023-11-27 RX ADMIN — DEXAMETHASONE 6 MG: 6 TABLET ORAL at 09:30

## 2023-11-27 RX ADMIN — PIPERACILLIN SODIUM AND TAZOBACTAM SODIUM 3.38 G: 3; .375 INJECTION, SOLUTION INTRAVENOUS at 22:31

## 2023-11-27 RX ADMIN — HEPARIN SODIUM 5000 UNITS: 5000 INJECTION INTRAVENOUS; SUBCUTANEOUS at 18:05

## 2023-11-27 RX ADMIN — PIPERACILLIN SODIUM AND TAZOBACTAM SODIUM 3.38 G: 3; .375 INJECTION, SOLUTION INTRAVENOUS at 03:55

## 2023-11-27 RX ADMIN — PIPERACILLIN SODIUM AND TAZOBACTAM SODIUM 3.38 G: 3; .375 INJECTION, SOLUTION INTRAVENOUS at 16:00

## 2023-11-27 RX ADMIN — Medication 100 MG: at 05:59

## 2023-11-27 RX ADMIN — PIPERACILLIN SODIUM AND TAZOBACTAM SODIUM 3.38 G: 3; .375 INJECTION, SOLUTION INTRAVENOUS at 09:30

## 2023-11-27 RX ADMIN — HEPARIN SODIUM 5000 UNITS: 5000 INJECTION INTRAVENOUS; SUBCUTANEOUS at 10:05

## 2023-11-27 ASSESSMENT — COGNITIVE AND FUNCTIONAL STATUS - GENERAL
EATING MEALS: A LITTLE
DAILY ACTIVITIY SCORE: 11
WALKING IN HOSPITAL ROOM: TOTAL
CLIMB 3 TO 5 STEPS WITH RAILING: TOTAL
DRESSING REGULAR UPPER BODY CLOTHING: A LOT
DRESSING REGULAR LOWER BODY CLOTHING: TOTAL
DRESSING REGULAR UPPER BODY CLOTHING: A LITTLE
DAILY ACTIVITIY SCORE: 15
PERSONAL GROOMING: A LOT
TOILETING: A LOT
TURNING FROM BACK TO SIDE WHILE IN FLAT BAD: A LOT
MOBILITY SCORE: 12
MOVING FROM LYING ON BACK TO SITTING ON SIDE OF FLAT BED WITH BEDRAILS: A LOT
HELP NEEDED FOR BATHING: A LOT
HELP NEEDED FOR BATHING: TOTAL
TURNING FROM BACK TO SIDE WHILE IN FLAT BAD: A LITTLE
TOILETING: A LOT
CLIMB 3 TO 5 STEPS WITH RAILING: TOTAL
DRESSING REGULAR UPPER BODY CLOTHING: A LOT
STANDING UP FROM CHAIR USING ARMS: A LOT
PERSONAL GROOMING: A LITTLE
MOVING FROM LYING ON BACK TO SITTING ON SIDE OF FLAT BED WITH BEDRAILS: A LOT
STANDING UP FROM CHAIR USING ARMS: A LOT
MOVING TO AND FROM BED TO CHAIR: TOTAL
DRESSING REGULAR LOWER BODY CLOTHING: A LOT
DAILY ACTIVITIY SCORE: 11
DRESSING REGULAR LOWER BODY CLOTHING: TOTAL
WALKING IN HOSPITAL ROOM: TOTAL
HELP NEEDED FOR BATHING: TOTAL
EATING MEALS: A LITTLE
MOVING TO AND FROM BED TO CHAIR: A LOT
MOVING FROM LYING ON BACK TO SITTING ON SIDE OF FLAT BED WITH BEDRAILS: A LITTLE
MOVING TO AND FROM BED TO CHAIR: TOTAL
CLIMB 3 TO 5 STEPS WITH RAILING: TOTAL
STANDING UP FROM CHAIR USING ARMS: A LOT
EATING MEALS: A LITTLE
MOBILITY SCORE: 9
TOILETING: A LOT
MOBILITY SCORE: 9
PERSONAL GROOMING: A LOT
WALKING IN HOSPITAL ROOM: TOTAL
TURNING FROM BACK TO SIDE WHILE IN FLAT BAD: A LOT

## 2023-11-27 ASSESSMENT — ENCOUNTER SYMPTOMS
JOINT SWELLING: 0
LIGHT-HEADEDNESS: 0
WHEEZING: 0
CONSTIPATION: 0
FACIAL SWELLING: 0
DIZZINESS: 0
FATIGUE: 0
VOMITING: 0
HEADACHES: 0
FEVER: 0
COUGH: 0
BACK PAIN: 0
ABDOMINAL PAIN: 0
FLANK PAIN: 0
SORE THROAT: 0
TROUBLE SWALLOWING: 0
APPETITE CHANGE: 0
NAUSEA: 0
DIAPHORESIS: 0
HALLUCINATIONS: 0
BLOOD IN STOOL: 0
HEMATURIA: 0
CHEST TIGHTNESS: 0
PALPITATIONS: 0
SHORTNESS OF BREATH: 1
NUMBNESS: 0
WEAKNESS: 0
BRUISES/BLEEDS EASILY: 0
DYSURIA: 0
EYE PAIN: 0
FREQUENCY: 0
DIARRHEA: 0
CHILLS: 0
WOUND: 0

## 2023-11-27 ASSESSMENT — PAIN SCALES - GENERAL: PAINLEVEL_OUTOF10: 0 - NO PAIN

## 2023-11-27 ASSESSMENT — PAIN - FUNCTIONAL ASSESSMENT
PAIN_FUNCTIONAL_ASSESSMENT: 0-10
PAIN_FUNCTIONAL_ASSESSMENT: 0-10

## 2023-11-27 NOTE — PROGRESS NOTES
Occupational Therapy    Evaluation    Patient Name: Hai Laboy  MRN: [id number]  Today's Date: 11/27/2023  Time Calculation  Start Time: 1203  Stop Time: 1223  Time Calculation (min): 20 min        Assessment:  OT Assessment: OT eval completed. Pt presents with decrease strength, poor endurance and impared balance. Current level of assist is MOD A x2 for mobility  and anticipate MAX  A for ADLs. Pt would benefit from further OT to prevent further functional decline  End of Session Communication: Bedside nurse  End of Session Patient Position: Bed, 3 rail up, Alarm on (CALL LIGHT IN REACH)  OT Assessment Results: Decreased ADL status, Decreased endurance, Decreased safe judgment during ADL, Decreased functional mobility, Decreased upper extremity strength  Strengths: Support of extended family/friends  Plan:  OT Frequency: 3 times per week  Equipment Recommended upon Discharge:  (TBD)  OT Recommended Transfer Status: Assist of 2  OT - OK to Discharge: Yes (ok for dc to next level of care once medically cleared)       Subjective   Current Problem:  1. Acute respiratory failure due to COVID-19 (CMS/HCC)        2. COVID-19        3. Acute on chronic respiratory failure with hypoxia and hypercapnia (CMS/HCC)          General:  General  Reason for Referral: impaired mobility and ADLs d/t (DX: COVID-19)  Referred By: SARAH  Past Medical History Relevant to Rehab: SOB; DX: COVID, SEPSIS, RESPIRATORY FAILURE METABOLIC ENCEPHALOPATHY, PNEUMONIA; HX: COPD, C DIFF/COLITIS, WRIST SURG, DM, HTN, CVA  Co-Treatment: PT  Co-Treatment Reason: Co-eval to maximize safety during mobility  Prior to Session Communication: Bedside nurse  Patient Position Received: Bed, 3 rail up, Alarm on (COVID ISOLATION ROOM 2321, ALERT IV  AGREEES TO MOBILIZE, O2 off upon arrival to room, place back on patient by therapist.)  Precautions:  Medical Precautions: Fall precautions, Infection precautions, Oxygen therapy device and L/min (O2 NC)  Vital  Signs:  SpO2:  (at rest SPO2 94% after activity oxygen sat in 80's return above 90% recovery time 30 - 60 seconds)  Pain:  Pain Assessment  Pain Assessment: 0-10  Pain Score:  (Pt reports chest discomfort upon exertion however did not rate)    Objective   Cognition:  Orientation Level: Disoriented to situation, Disoriented to time  Attention: Exceptions to WFL  Other (Comment): non - verbal most of session, nods head to answer questions or uses fingers to respond to questions           Home Living:  Type of Home:  (Diffculty assessing PLOF , pt non-verbal however nods head to yes and no questions. Pt reports lives w/ friend, assist with ADLs and IADLs, ambulation w/ cane)  Lives With: Friends  Home Layout: One level  Prior Function:     IADL History:     ADL:  ADL Comments: Simulated UB ADL while perofrming UE AROM screen, pt fatigues easily with movement to BUE anticipate MAX A for UB ADLs. Limited standing tolerance 30-60 sec before demo signs of fatigue, anticipate MAX A for LB ADLs  Activity Tolerance:  Endurance: Tolerates 10 - 20 min exercise with multiple rests, Decreased tolerance for upright activites  Bed Mobility/Transfers: Bed Mobility  Bed Mobility:  (MOD X2 SUPINE<>SIT, SITS EOB FOR TOTATL 6 MIN SBA, SLOUCHES  DURING SITTING)    Transfers  Transfer:  (SIT<>STAND MOD X2 ARMINARM, LEGS UNSTEADY TAKES SUPPORT ON BACK OF LEGS FROM  BED SMALL STEPS FOOT FLAT WIDE TEJINDER)      Ambulation/Gait Training:     Sitting Balance:  Static Sitting Balance  Static Sitting-Balance Support: Feet supported, Bilateral upper extremity supported (bed rail)  Static Sitting-Level of Assistance: Close supervision  Standing Balance:  Static Standing Balance  Static Standing-Balance Support: Bilateral upper extremity supported  Static Standing-Level of Assistance: Moderate assistance   Modalities:     Vision:   Sensation:  Sensation Comment: no c/o  Strength:  Strength Comments: No formal MMT applied, pt to fatigue to apply  resistance. Per clinical observation 3/5  BUE grossly  Perception:     Coordination:      Hand Function:  Gross Grasp: Functional  Coordination: Functional  Extremities: RUE   RUE : Within Functional Limits and LUE   LUE: Within Functional Limits        Outcome Measures:[facility] Daily Activity  Putting on and taking off regular lower body clothing: Total  Bathing (including washing, rinsing, drying): Total  Putting on and taking off regular upper body clothing: A lot  Toileting, which includes using toilet, bedpan or urinal: A lot  Taking care of personal grooming such as brushing teeth: A lot  Eating Meals: A little  Daily Activity - Total Score: 11        Education Documentation  Body Mechanics, taught by Marguerite Gordon OT at 11/27/2023  4:23 PM.  Learner: Patient  Readiness: Acceptance  Method: Demonstration  Response: Needs Reinforcement    Precautions, taught by Marguerite Gordon OT at 11/27/2023  4:23 PM.  Learner: Patient  Readiness: Acceptance  Method: Demonstration  Response: Needs Reinforcement    ADL Training, taught by Marguerite Gordon OT at 11/27/2023  4:23 PM.  Learner: Patient  Readiness: Acceptance  Method: Demonstration  Response: Needs Reinforcement    Education Comments  No comments found.        OP EDUCATION:  Education  Individual(s) Educated: Patient  Education Provided: Fall precautons, Risk and benefits of OT discussed with patient or other, POC discussed and agreed upon  Risk and Benefits Discussed with Patient/Caregiver/Other: yes  Patient/Caregiver Demonstrated Understanding: yes  Plan of Care Discussed and Agreed Upon: yes  Patient Response to Education:  (pt non-verbal however nod's head for understanding)    Goals:  Encounter Problems       Encounter Problems (Active)       ADLs       Patient with complete upper body dressing with minimal assist  level of assistance donning and doffing all UE clothes with no adaptive equipment while edge of bed  (Not Progressing)       Start:  11/27/23    Expected  End:  12/11/23       Simulated UB ADL, MAX A d/t poor BUE endurance         Patient will complete daily grooming tasks brushing teeth, washing face/hair, and unsupported sitting with stand by assist level of assistance and PRN adaptive equipment while edge of bed . (Not Progressing)       Start:  11/27/23    Expected End:  12/11/23                       TRANSFERS       Patient will complete sit to stand transfer with minimal assist  level of assistance and front wheeled walker in order to improve safety and prepare for out of bed mobility. (Not Progressing)       Start:  11/27/23    Expected End:  12/11/23       MOD A x2 for STS

## 2023-11-27 NOTE — PROGRESS NOTES
Notified by Nichelle Campbell RN [facility]  that pt preference for SNF is [facility], Referral to be sent    11.28.23 [id number] [facility] Precert has been sent to  Case Management for submission.       11.29.23 [id number]  Precert approved

## 2023-11-27 NOTE — CARE PLAN
Problem: Mobility  Goal: STG - Patient will ambulate  Description: MIN X1 A FWW, 50-75 FT  Outcome: Not Progressing  Goal: STRENGTHENING  Description: 20+ REPS RROM INCREASINGSSTRENGTH TO STABILIZE OOB ACTIVITIES  Outcome: Not Progressing     Problem: Transfers  Goal: STG - Patient will perform bed mobility  Description: MIN A USING RAILS  Outcome: Not Progressing  Goal: STG - Patient will transfer sit to and from stand  Description: FWW MIN A USING PROPER SAFETY TECHNIQUES  Outcome: Not Progressing

## 2023-11-27 NOTE — PROGRESS NOTES
Physical Therapy    Physical Therapy Evaluation    Patient Name: Hai Laboy  MRN: [id number]  Today's Date: 11/27/2023   Time Calculation  Start Time: 1204  Stop Time: 1224  Time Calculation (min): 20 min    Assessment/Plan   PT Assessment  PT Assessment Results: Decreased strength, Decreased endurance, Impaired balance, Decreased mobility, Decreased safety awareness, Impaired judgement, Pain  Rehab Prognosis: Fair  Evaluation/Treatment Tolerance: Patient limited by fatigue, Patient limited by pain  Strengths: Support of extended family/friends  End of Session Communication: Bedside nurse  Assessment Comment:  (NEEDS 2 A FRO MOBILTIY AT BEDSIDE, VERY FATIGUED W/ MOBILITY, MOD SOB, HIGH FALLRISK WILLNEED SKLLED REHAB ON DISCH)  End of Session Patient Position: Bed, 3 rail up, Alarm on (CALL LIGHT IN REACH)  IP OR SWING BED PT PLAN  Inpatient or Swing Bed: Inpatient  PT Plan  Treatment/Interventions: Bed mobility, Transfer training, Gait training, Balance training, Strengthening, Endurance training  PT Plan: Skilled PT  PT Frequency: 3 times per week  PT Discharge Recommendations: Moderate intensity level of continued care  Equipment Recommended upon Discharge:  (TBD)  PT Recommended Transfer Status: Assist x2 (FWW)  PT - OK to Discharge: Yes (TO NEXT LOC WHEN MEDICALLY CLEARED)      Subjective   General Visit Information:  General  Reason for Referral: IMPAIRED MOBILTIY  Referred By: SARAH  Past Medical History Relevant to Rehab: SOB; DX: COVID, SEPSIS, RESPIRATORY FAILURE METABOLIC ENCEPHALOPATHY, PNEUMONIA; HX: COPD, C DIFF/COLITIS, WRIST SURG, DM, HTN, CVA  Co-Treatment: OT  Co-Treatment Reason: FACILITATE MOBILITY SAFETY  Prior to Session Communication: Bedside nurse  Patient Position Received: Bed, 3 rail up, Alarm on (COVID ISOLATION ROOM 2321, ALERT IV  AGREEES TO MOBILIZE, 2L O2 OFF PT., OT REPLACED IN NOSE)  Home Living:  Home Living  Home Living Comments:  (1 LEVL HOEM 1 STEP TO ENTER, FRIEND HELPS W/ ADL  & IADL, TAKES TO APPTS, PT. VAGUE AS TO WHETHER THE FRIEND LIVES W/ HIM, AMB W/ CANE)       Precautions:  Precautions  Medical Precautions: Fall precautions, Infection precautions, Oxygen therapy device and L/min (2L O2 NC)  Vital Signs:  Vital Signs  SpO2:  (SATS DROP TO MID 80'S W/ ACTIVITY)    Objective   Pain:  Pain Assessment  Pain Score:  (C/O CHEST PAIN W/ SOB/ MOBILTIY, DOES NOT RATE)  Cognition:  Cognition  Orientation Level: Disoriented to situation, Disoriented to time  Attention: Exceptions to WFL  Other (Comment):  (PT. NOT VERBALIZING AT TIME OF EVAL, HOLDS FINGER UP FOR SOME ANSWERS AND NODS/SHAKES HEAD)                Activity Tolerance  Endurance: Tolerates 10 - 20 min exercise with multiple rests, Decreased tolerance for upright activites    Sensation  Sensation Comment: NO S/O    Strength  Strength Comments: ROM WFL IN LEGS, STRENGTH NOTED TO BE 3-/5, POOR MUSCUALR ENDURANCE                     Static Sitting Balance  Static Sitting-Comment/Number of Minutes: FAIR  Dynamic Sitting Balance  Dynamic Sitting-Comments: FAIR-    Static Standing Balance  Static Standing-Comment/Number of Minutes: FAIR-  Dynamic Standing Balance  Dynamic Standing-Comments: POOR+  Functional Assessments:  Bed Mobility  Bed Mobility:  (MOD X2 SUPINE<>SIT, SITS EOB FOR TOTATL 6 MIN SBA, SLOUCHES  DURING SITTING)    Transfers  Transfer:  (SIT<>STAND MOD X2 ARMINARM, LEGS UNSTEADY TAKES SUPPORT ON BACK OF LEGS FROM  BED SMALL STEPS FOOT FLAT WIDE TEJINDER)    Ambulation/Gait Training  Ambulation/Gait Training Performed:  (MOD XW JEYSON ARM SUPPORT ADN BED SUPPORT ON BACK OF LEGS,SMALL STEPS TO R TOWARDS HOB, FOOT FLAT , 2 FT, MOD SOB)                    Outcome Measures:  [facility] Basic Mobility  Turning from your back to your side while in a flat bed without using bedrails: A lot  Moving from lying on your back to sitting on the side of a flat bed without using bedrails: A lot  Moving to and from bed to chair (including a  wheelchair): Total  Standing up from a chair using your arms (e.g. wheelchair or bedside chair): A lot  To walk in hospital room: Total  Climbing 3-5 steps with railing: Total  Basic Mobility - Total Score: 9    Encounter Problems       Encounter Problems (Active)       Mobility       STG - Patient will ambulate (Not Progressing)       Start:  11/27/23    Expected End:  12/13/23       MIN X1 A FWW, 50-75 FT         STRENGTHENING (Not Progressing)       Start:  11/27/23    Expected End:  12/18/23       20+ REPS RROM INCREASINGSSTRENGTH TO STABILIZE OOB ACTIVITIES            Pain - Adult          Transfers       STG - Patient will perform bed mobility (Not Progressing)       Start:  11/27/23    Expected End:  12/12/23       MIN A USING RAILS         STG - Patient will transfer sit to and from stand (Not Progressing)       Start:  11/27/23    Expected End:  12/13/23       FWW MIN A USING PROPER SAFETY TECHNIQUES                Education Documentation  Mobility Training, taught by Morena Frey PT at 11/27/2023  1:55 PM.  Learner: Patient  Readiness: Acceptance  Method: Explanation  Response: Needs Reinforcement  Comment: ROLEOF IP REHAB, I MPORTANCE OF MOBILITY IN RECOVERY, PACIGN ACTIVITY    Education Comments  No comments found.

## 2023-11-27 NOTE — PROGRESS NOTES
Hai Laboy is a 78 y.o. male on day 3 of admission presenting with Acute respiratory failure due to COVID-19 (CMS/[facility]).      Subjective   Hai Laboy is a 78 y.o. male with COPD, hypertension, hyperlipidemia, type 2 diabetes mellitus, prostate cancer, and previous stroke who presented 11/23/23 with shortness of breath. CXR with pneumonia. Started on BiPAP then weaned to NC. Lactate negative. CRP 2.56. D-dimer 1224. Bl cx negative x3 days    11/27/23: No acute events overnight. Vitals stable, 95-97% on 2L. Labs largely unremarkable.  Mentation improving to most recent baseline          Review of Systems   Constitutional:  Negative for appetite change, chills, diaphoresis, fatigue and fever.   HENT:  Negative for congestion, ear pain, facial swelling, hearing loss, nosebleeds, sore throat, tinnitus and trouble swallowing.    Eyes:  Negative for pain.   Respiratory:  Positive for shortness of breath. Negative for cough, chest tightness and wheezing.    Cardiovascular:  Negative for chest pain, palpitations and leg swelling.   Gastrointestinal:  Negative for abdominal pain, blood in stool, constipation, diarrhea, nausea and vomiting.   Genitourinary:  Negative for dysuria, flank pain, frequency, hematuria and urgency.   Musculoskeletal:  Negative for back pain and joint swelling.   Skin:  Negative for rash and wound.   Neurological:  Negative for dizziness, syncope, weakness, light-headedness, numbness and headaches.   Hematological:  Does not bruise/bleed easily.   Psychiatric/Behavioral:  Negative for behavioral problems, hallucinations and suicidal ideas.           Objective     Last Recorded Vitals  /60 (BP Location: Right arm, Patient Position: Lying)   Pulse 61   Temp 36.4 °C (97.6 °F) (Temporal)   Resp 18   Wt 57.2 kg (126 lb 1.7 oz)   SpO2 96%     Image Results  XR chest 1 view    Result Date: 11/23/2023  Interpreted By:  Alvin Johnston, STUDY: XR CHEST 1 VIEW;  11/23/2023 11:06 pm   INDICATION:  Signs/Symptoms:SOB.   COMPARISON: 11/12/2023   ACCESSION NUMBER(S): [id number]   ORDERING CLINICIAN: KATIE COOK   FINDINGS: The cardiac silhouette is normal in size. Atherosclerotic calcification of the thoracic aorta. There is pulmonary emphysema. There stable irregular opacities compatible with calcified pleural plaques. There is basilar atelectasis. No significant pleural effusion. No pneumothorax.       Pulmonary emphysema and stable bilateral irregular dense opacities compatible with calcified pleural plaques.   MACRO: None   Signed by: Alvin Johnston 11/23/2023 11:36 PM Dictation workstation:   [id number]    CT head wo IV contrast    Result Date: 11/12/2023  Interpreted By:  Sabrina Ramirez, STUDY: CT HEAD WO IV CONTRAST;  11/12/2023 6:01 pm   INDICATION: Signs/Symptoms:confusion.   COMPARISON: 10/16/2023   ACCESSION NUMBER(S): [id number]   ORDERING CLINICIAN: CASSIDY MEREDITH   TECHNIQUE: Axial noncontrast CT images of the head.   FINDINGS: BRAIN PARENCHYMA: Extensive parenchymal volume loss, nonspecific white matter changes and calcifications of the carotid arteries. No mass effect or midline shift. Focus of encephalomalacia in the right cerebellar hemisphere and extending from the left middle cerebellar peduncle into the left cerebellar hemisphere, similar to prior imaging. Punctate calcifications within the right sulci similar to prior imaging.   HEMORRHAGE: No acute intracranial hemorrhage. VENTRICLES and EXTRA-AXIAL SPACES: Margin of the lateral and 3rd ventricles are again noted which may be seen with central white matter loss or normal pressure hydrocephalus.. Ex vacuo dilatation of the 4th ventricle likely related to prior infarct. EXTRACRANIAL SOFT TISSUES: Within normal limits. PARANASAL SINUSES/MASTOIDS: The visualized paranasal sinuses and mastoid air cells are aerated. CALVARIUM: No depressed skull fracture. No destructive osseous lesion.   OTHER FINDINGS: None.       No acute intracranial  hemorrhage or mass effect.   Chronic white matter changes and sequela of remote posterior fossa infarcts again noted.   Ventricular prominence again noted.   MACRO: None.   Signed by: Sabrina Ramirez 11/12/2023 6:33 PM Dictation workstation:   [id number]    XR chest 1 view    Result Date: 11/12/2023  Interpreted By:  Bhavesh Barros, STUDY: XR CHEST 1 VIEW;  11/12/2023 4:33 pm   INDICATION: Signs/Symptoms:cough, sob.   COMPARISON: 11/07/2023.   ACCESSION NUMBER(S): [id number]   ORDERING CLINICIAN: CASSIDY MEREDITH   FINDINGS: No consolidation. No pleural effusion or pneumothorax. Hyperinflated lungs suggestive of COPD. Right-sided pleural calcifications unchanged. Left pleural calcification also present. Asymmetric elevation of the left hemidiaphragm. Atherosclerosis. Normal heart size. No acute osseous abnormality.       No acute cardiopulmonary abnormality.   Signed by: Bhavesh Barros 11/12/2023 4:45 PM Dictation workstation:   [id number]    CT angio chest for pulmonary embolism    Result Date: 11/7/2023  Interpreted By:  Ramana Ortega, STUDY: CT ANGIO CHEST FOR PULMONARY EMBOLISM; 11/7/2023 1:10 pm   INDICATION: Signs/Symptoms:SOB.   COMPARISON: CT chest dated 10/16/2023.   ACCESSION NUMBER(S): [id number]   ORDERING CLINICIAN: TRISHA LANDON   TECHNIQUE: Contiguous axial CT images were obtained through the chest at 2 mm slice thickness following administration of intravenous contrast utilizing pulmonary artery bolus tracking. 50 cc of Omnipaque 350 was administered. The images were then reconstructed in the coronal and sagittal planes. MIP images were created and reviewed.   FINDINGS: POTENTIAL LIMITATIONS OF THE STUDY: None   HEART and VESSELS: There is dense opacification of the pulmonary arterial system. No intraluminal filling defect is seen within the pulmonary arteries to suggest acute pulmonary embolus.   The heart is within normal limits for size. No pericardial effusion is identified. Dense  atherosclerotic calcifications are seen in the coronary arteries. Mild aortic valve calcifications are present.   Moderate atherosclerotic calcifications are seen in the aortic arch and descending thoracic aorta , including at the origins of the arch vessels. The thoracic aorta is within normal limits for course and caliber, without evidence of aneurysm.   MEDIASTINUM and SHANTA, LOWER NECK AND AXILLA: Evaluation of the visualized neck base demonstrates no gross mass or lymphadenopathy.   There is no gross axillary, hilar or mediastinal lymphadenopathy identified.   LUNGS and AIRWAYS: The trachea and central airways are grossly patent without evidence of endobronchial lesion.   Mild-to-moderate emphysematous changes are seen in the lungs bilaterally. Focal airspace consolidation is seen in the posteromedial aspect of the left mid lung, similar to the prior study, and may represent scarring/chronic atelectasis. Dense pleural calcifications are seen in the right lung. No discrete pulmonary nodules or masses are seen, though evaluation is limited within the regions of airspace consolidation..   UPPER ABDOMEN: Evaluation of the visualized upper abdomen demonstrates no discrete mass or lymphadenopathy.   CHEST WALL and OSSEOUS STRUCTURES: There is no evidence of acute fracture identified. Multilevel degenerative changes are seen in the thoracic spine.       1. No evidence of acute pulmonary embolus. 2. Left lower lobe airspace consolidation, most consistent with scarring/chronic atelectasis. 3. Emphysematous changes in the lungs bilaterally. 4. Extensive pleural calcifications in the right chest.   MACRO: None.     Signed by: Ramana Ortega 11/7/2023 1:30 PM Dictation workstation:   [id number]    XR chest 2 views    Result Date: 11/7/2023  Interpreted By:  Aretha Skelton, STUDY: XR CHEST 2 VIEWS;  11/7/2023 1:11 pm   INDICATION: Signs/Symptoms:cough.   COMPARISON: 10/16/2023   ACCESSION NUMBER(S): [id number]   ORDERING  CLINICIAN: TRISHA LANDON   FINDINGS: Prominent calcified pleural plaques are seen. Emphysematous changes are noted. Elevation of the left hemidiaphragm is again seen, which is unchanged. The heart is not enlarged. No consolidation, pleural effusion pneumothorax is noted.       Chronic lung changes.     MACRO: None   Signed by: Aretha Skelton 11/7/2023 1:16 PM Dictation workstation:   [id number]       Lab Results  Results for orders placed or performed during the hospital encounter of 11/23/23 (from the past 24 hour(s))   POCT GLUCOSE   Result Value Ref Range    POCT Glucose 113 (H) 74 - 99 mg/dL   POCT GLUCOSE   Result Value Ref Range    POCT Glucose 158 (H) 74 - 99 mg/dL   CBC and Auto Differential   Result Value Ref Range    WBC 10.0 4.4 - 11.3 x10*3/uL    nRBC 0.0 0.0 - 0.0 /100 WBCs    RBC 3.30 (L) 4.50 - 5.90 x10*6/uL    Hemoglobin 10.4 (L) 13.5 - 17.5 g/dL    Hematocrit 30.6 (L) 41.0 - 52.0 %    MCV 93 80 - 100 fL    MCH 31.5 26.0 - 34.0 pg    MCHC 34.0 32.0 - 36.0 g/dL    RDW 14.6 (H) 11.5 - 14.5 %    Platelets 150 150 - 450 x10*3/uL    Neutrophils % 91.0 40.0 - 80.0 %    Immature Granulocytes %, Automated 0.6 0.0 - 0.9 %    Lymphocytes % 4.8 13.0 - 44.0 %    Monocytes % 3.5 2.0 - 10.0 %    Eosinophils % 0.0 0.0 - 6.0 %    Basophils % 0.1 0.0 - 2.0 %    Neutrophils Absolute 9.13 (H) 1.60 - 5.50 x10*3/uL    Immature Granulocytes Absolute, Automated 0.06 0.00 - 0.50 x10*3/uL    Lymphocytes Absolute 0.48 (L) 0.80 - 3.00 x10*3/uL    Monocytes Absolute 0.35 0.05 - 0.80 x10*3/uL    Eosinophils Absolute 0.00 0.00 - 0.40 x10*3/uL    Basophils Absolute 0.01 0.00 - 0.10 x10*3/uL   Basic Metabolic Panel   Result Value Ref Range    Glucose 155 (H) 74 - 99 mg/dL    Sodium 136 136 - 145 mmol/L    Potassium 4.9 3.5 - 5.3 mmol/L    Chloride 105 98 - 107 mmol/L    Bicarbonate 27 21 - 32 mmol/L    Anion Gap 9 (L) 10 - 20 mmol/L    Urea Nitrogen 27 (H) 6 - 23 mg/dL    Creatinine 0.86 0.50 - 1.30 mg/dL    eGFR 88 >60  mL/min/1.73m*2    Calcium 7.7 (L) 8.6 - 10.3 mg/dL   C-reactive protein   Result Value Ref Range    C-Reactive Protein 2.56 (H) <1.00 mg/dL   POCT GLUCOSE   Result Value Ref Range    POCT Glucose 150 (H) 74 - 99 mg/dL   POCT GLUCOSE   Result Value Ref Range    POCT Glucose 148 (H) 74 - 99 mg/dL   POCT GLUCOSE   Result Value Ref Range    POCT Glucose 147 (H) 74 - 99 mg/dL        Medications  Scheduled medications:  dexAMETHasone, 6 mg, oral, Daily   Or  dexAMETHasone, 6 mg, oral, Daily   Or  dexAMETHasone, 6 mg, intravenous, Daily  heparin (porcine), 5,000 Units, subcutaneous, q8h  insulin regular, 0-15 Units, subcutaneous, TID with meals  piperacillin-tazobactam, 3.375 g, intravenous, q6h  polyethylene glycol, 17 g, oral, Daily  remdesivir, 100 mg, intravenous, q24h      Continuous medications:     PRN medications:  PRN medications: dextrose 10 % in water (D10W), dextrose, glucagon, oxygen, oxygen     Physical Exam  Constitutional:       General: He is not in acute distress.     Appearance: Normal appearance.      Comments: Cachexia   HENT:      Head: Normocephalic and atraumatic.      Right Ear: External ear normal.      Left Ear: External ear normal.      Nose: Nose normal.      Mouth/Throat:      Mouth: Mucous membranes are moist.      Pharynx: Oropharynx is clear.   Eyes:      Extraocular Movements: Extraocular movements intact.      Conjunctiva/sclera: Conjunctivae normal.      Pupils: Pupils are equal, round, and reactive to light.   Cardiovascular:      Rate and Rhythm: Normal rate and regular rhythm.      Pulses: Normal pulses.      Heart sounds: Normal heart sounds.   Pulmonary:      Effort: Pulmonary effort is normal. No respiratory distress.      Breath sounds: No wheezing, rhonchi or rales.      Comments: NC in place  Abdominal:      General: Abdomen is flat. Bowel sounds are normal.      Palpations: Abdomen is soft.      Tenderness: There is no abdominal tenderness. There is no right CVA tenderness,  left CVA tenderness, guarding or rebound.      Comments: Scaphoid   Musculoskeletal:         General: No swelling. Normal range of motion.      Cervical back: Normal range of motion and neck supple.   Skin:     General: Skin is warm and dry.      Capillary Refill: Capillary refill takes less than 2 seconds.      Findings: No lesion or rash.   Neurological:      General: No focal deficit present.      Mental Status: He is alert. Mental status is at baseline. He is disoriented.      Comments: Unknown year or month   Psychiatric:         Mood and Affect: Mood normal.         Behavior: Behavior normal.                  Code Status  Full Code     Assessment/Plan      Acute exacerbation of chronic obstructive pulmonary disease (COPD) (CMS/[facility])  Assessment & Plan  Steroids for COPD/COVID    HTN (hypertension)  Assessment & Plan  Continue home medications    Acute metabolic encephalopathy  Assessment & Plan  Appears to be back to his baseline from most recent hospitalization    Acute pneumonia  Assessment & Plan  Zosyn  Appreciate ID recommendations    Hyperlipidemia  Assessment & Plan  Continue home medications    * Acute respiratory failure due to COVID-19 (CMS/[facility])  Assessment & Plan  Supplemental oxygen  Wean as tolerated  Continue remdesivir  Continue Decadron  Follow cultures  ID recommendations appreciated           Malnutrition          I agree with the dietitian's malnutrition diagnosis.    DVT ppx: subcutaneous Heparin     Please see orders for more complete plan    Tressa Davila PA-C

## 2023-11-27 NOTE — ASSESSMENT & PLAN NOTE
Supplemental oxygen  Wean as tolerated  Continue remdesivir  Continue Decadron  Follow cultures  ID recommendations appreciated

## 2023-11-27 NOTE — CARE PLAN
The patient's goals for the shift include      The clinical goals for the shift include pt will maintain a pulse ox of 90 or greater    Over the shift, the patient did not make progress toward the following goals. Barriers to progression include . Recommendations to address these barriers include .

## 2023-11-27 NOTE — CARE PLAN
Problem: ADLs  Goal: Patient with complete upper body dressing with minimal assist  level of assistance donning and doffing all UE clothes with no adaptive equipment while edge of bed   Description: Simulated UB ADL, MAX A d/t poor BUE endurance  Outcome: Not Progressing  Goal: Patient will complete daily grooming tasks brushing teeth, washing face/hair, and unsupported sitting with stand by assist level of assistance and PRN adaptive equipment while edge of bed .  Outcome: Not Progressing     Problem: TRANSFERS  Goal: Patient will complete sit to stand transfer with minimal assist  level of assistance and front wheeled walker in order to improve safety and prepare for out of bed mobility.  Description: MOD A x2 for STS  Outcome: Not Progressing

## 2023-11-28 LAB
ANION GAP SERPL CALC-SCNC: 11 MMOL/L (ref 10–20)
BACTERIA BLD CULT: NORMAL
BACTERIA BLD CULT: NORMAL
BASOPHILS # BLD AUTO: 0.01 X10*3/UL (ref 0–0.1)
BASOPHILS NFR BLD AUTO: 0.1 %
BUN SERPL-MCNC: 25 MG/DL (ref 6–23)
CALCIUM SERPL-MCNC: 8.1 MG/DL (ref 8.6–10.3)
CHLORIDE SERPL-SCNC: 103 MMOL/L (ref 98–107)
CO2 SERPL-SCNC: 29 MMOL/L (ref 21–32)
CREAT SERPL-MCNC: 0.87 MG/DL (ref 0.5–1.3)
EOSINOPHIL # BLD AUTO: 0 X10*3/UL (ref 0–0.4)
EOSINOPHIL NFR BLD AUTO: 0 %
ERYTHROCYTE [DISTWIDTH] IN BLOOD BY AUTOMATED COUNT: 14.3 % (ref 11.5–14.5)
GFR SERPL CREATININE-BSD FRML MDRD: 87 ML/MIN/1.73M*2
GLUCOSE BLD MANUAL STRIP-MCNC: 106 MG/DL (ref 74–99)
GLUCOSE BLD MANUAL STRIP-MCNC: 84 MG/DL (ref 74–99)
GLUCOSE BLD MANUAL STRIP-MCNC: 85 MG/DL (ref 74–99)
GLUCOSE BLD MANUAL STRIP-MCNC: 88 MG/DL (ref 74–99)
GLUCOSE SERPL-MCNC: 119 MG/DL (ref 74–99)
HCT VFR BLD AUTO: 34.5 % (ref 41–52)
HGB BLD-MCNC: 11.9 G/DL (ref 13.5–17.5)
IMM GRANULOCYTES # BLD AUTO: 0.1 X10*3/UL (ref 0–0.5)
IMM GRANULOCYTES NFR BLD AUTO: 0.9 % (ref 0–0.9)
LYMPHOCYTES # BLD AUTO: 0.64 X10*3/UL (ref 0.8–3)
LYMPHOCYTES NFR BLD AUTO: 6.1 %
MCH RBC QN AUTO: 31.1 PG (ref 26–34)
MCHC RBC AUTO-ENTMCNC: 34.5 G/DL (ref 32–36)
MCV RBC AUTO: 90 FL (ref 80–100)
MONOCYTES # BLD AUTO: 0.64 X10*3/UL (ref 0.05–0.8)
MONOCYTES NFR BLD AUTO: 6.1 %
NEUTROPHILS # BLD AUTO: 9.14 X10*3/UL (ref 1.6–5.5)
NEUTROPHILS NFR BLD AUTO: 86.8 %
NRBC BLD-RTO: 0 /100 WBCS (ref 0–0)
PLATELET # BLD AUTO: 162 X10*3/UL (ref 150–450)
POTASSIUM SERPL-SCNC: 5.1 MMOL/L (ref 3.5–5.3)
RBC # BLD AUTO: 3.83 X10*6/UL (ref 4.5–5.9)
SODIUM SERPL-SCNC: 138 MMOL/L (ref 136–145)
WBC # BLD AUTO: 10.5 X10*3/UL (ref 4.4–11.3)

## 2023-11-28 PROCEDURE — 2500000004 HC RX 250 GENERAL PHARMACY W/ HCPCS (ALT 636 FOR OP/ED): Performed by: INTERNAL MEDICINE

## 2023-11-28 PROCEDURE — 82374 ASSAY BLOOD CARBON DIOXIDE: CPT | Performed by: INTERNAL MEDICINE

## 2023-11-28 PROCEDURE — 1100000001 HC PRIVATE ROOM DAILY

## 2023-11-28 PROCEDURE — 36415 COLL VENOUS BLD VENIPUNCTURE: CPT | Performed by: INTERNAL MEDICINE

## 2023-11-28 PROCEDURE — 82947 ASSAY GLUCOSE BLOOD QUANT: CPT

## 2023-11-28 PROCEDURE — 2500000001 HC RX 250 WO HCPCS SELF ADMINISTERED DRUGS (ALT 637 FOR MEDICARE OP): Performed by: PHYSICIAN ASSISTANT

## 2023-11-28 PROCEDURE — 94660 CPAP INITIATION&MGMT: CPT

## 2023-11-28 PROCEDURE — 99233 SBSQ HOSP IP/OBS HIGH 50: CPT | Performed by: PHYSICIAN ASSISTANT

## 2023-11-28 PROCEDURE — 2500000005 HC RX 250 GENERAL PHARMACY W/O HCPCS: Performed by: INTERNAL MEDICINE

## 2023-11-28 PROCEDURE — 96372 THER/PROPH/DIAG INJ SC/IM: CPT | Performed by: INTERNAL MEDICINE

## 2023-11-28 PROCEDURE — 85025 COMPLETE CBC W/AUTO DIFF WBC: CPT | Performed by: INTERNAL MEDICINE

## 2023-11-28 RX ORDER — SODIUM CHLORIDE 0.9 % (FLUSH) 0.9 %
SYRINGE (ML) INJECTION
Status: DISPENSED
Start: 2023-11-28 | End: 2023-11-28

## 2023-11-28 RX ORDER — LISINOPRIL 20 MG/1
20 TABLET ORAL DAILY
Status: DISCONTINUED | OUTPATIENT
Start: 2023-11-28 | End: 2023-11-29 | Stop reason: HOSPADM

## 2023-11-28 RX ADMIN — LISINOPRIL 20 MG: 20 TABLET ORAL at 12:57

## 2023-11-28 RX ADMIN — PIPERACILLIN SODIUM AND TAZOBACTAM SODIUM 3.38 G: 3; .375 INJECTION, SOLUTION INTRAVENOUS at 11:54

## 2023-11-28 RX ADMIN — HEPARIN SODIUM 5000 UNITS: 5000 INJECTION INTRAVENOUS; SUBCUTANEOUS at 11:54

## 2023-11-28 RX ADMIN — PIPERACILLIN SODIUM AND TAZOBACTAM SODIUM 3.38 G: 3; .375 INJECTION, SOLUTION INTRAVENOUS at 03:44

## 2023-11-28 RX ADMIN — DEXAMETHASONE 6 MG: 6 TABLET ORAL at 11:54

## 2023-11-28 RX ADMIN — Medication 100 MG: at 05:35

## 2023-11-28 RX ADMIN — HEPARIN SODIUM 5000 UNITS: 5000 INJECTION INTRAVENOUS; SUBCUTANEOUS at 21:14

## 2023-11-28 RX ADMIN — HEPARIN SODIUM 5000 UNITS: 5000 INJECTION INTRAVENOUS; SUBCUTANEOUS at 03:45

## 2023-11-28 RX ADMIN — Medication 2 L/MIN: at 02:20

## 2023-11-28 RX ADMIN — PIPERACILLIN SODIUM AND TAZOBACTAM SODIUM 3.38 G: 3; .375 INJECTION, SOLUTION INTRAVENOUS at 18:18

## 2023-11-28 RX ADMIN — POLYETHYLENE GLYCOL 3350 17 G: 17 POWDER, FOR SOLUTION ORAL at 11:54

## 2023-11-28 ASSESSMENT — PAIN SCALES - GENERAL
PAINLEVEL_OUTOF10: 0 - NO PAIN
PAINLEVEL_OUTOF10: 0 - NO PAIN

## 2023-11-28 ASSESSMENT — COGNITIVE AND FUNCTIONAL STATUS - GENERAL
TURNING FROM BACK TO SIDE WHILE IN FLAT BAD: A LOT
DRESSING REGULAR LOWER BODY CLOTHING: TOTAL
TURNING FROM BACK TO SIDE WHILE IN FLAT BAD: A LOT
PATIENT BASELINE BEDBOUND: UNABLE TO ASSESS AT THIS TIME
EATING MEALS: A LITTLE
EATING MEALS: A LITTLE
TOILETING: A LOT
DRESSING REGULAR UPPER BODY CLOTHING: A LOT
STANDING UP FROM CHAIR USING ARMS: A LOT
PERSONAL GROOMING: A LOT
CLIMB 3 TO 5 STEPS WITH RAILING: TOTAL
MOBILITY SCORE: 10
MOBILITY SCORE: 9
PERSONAL GROOMING: A LOT
DAILY ACTIVITIY SCORE: 11
MOVING FROM LYING ON BACK TO SITTING ON SIDE OF FLAT BED WITH BEDRAILS: A LOT
WALKING IN HOSPITAL ROOM: TOTAL
DRESSING REGULAR LOWER BODY CLOTHING: TOTAL
CLIMB 3 TO 5 STEPS WITH RAILING: TOTAL
HELP NEEDED FOR BATHING: TOTAL
WALKING IN HOSPITAL ROOM: TOTAL
DRESSING REGULAR UPPER BODY CLOTHING: A LOT
STANDING UP FROM CHAIR USING ARMS: A LOT
MOVING FROM LYING ON BACK TO SITTING ON SIDE OF FLAT BED WITH BEDRAILS: A LITTLE
HELP NEEDED FOR BATHING: TOTAL
DAILY ACTIVITIY SCORE: 11
MOVING TO AND FROM BED TO CHAIR: TOTAL
TOILETING: A LOT
MOVING TO AND FROM BED TO CHAIR: TOTAL

## 2023-11-28 ASSESSMENT — ENCOUNTER SYMPTOMS
WEAKNESS: 0
DIAPHORESIS: 0
WOUND: 0
APPETITE CHANGE: 0
BLOOD IN STOOL: 0
PALPITATIONS: 0
JOINT SWELLING: 0
HALLUCINATIONS: 0
DYSURIA: 0
NAUSEA: 0
CONSTIPATION: 0
SORE THROAT: 0
BACK PAIN: 0
WHEEZING: 0
FATIGUE: 0
ABDOMINAL PAIN: 0
FLANK PAIN: 0
FREQUENCY: 0
HEADACHES: 0
COUGH: 0
CHEST TIGHTNESS: 0
HEMATURIA: 0
NUMBNESS: 0
LIGHT-HEADEDNESS: 0
SHORTNESS OF BREATH: 1
VOMITING: 0
BRUISES/BLEEDS EASILY: 0
DIARRHEA: 0
DIZZINESS: 0
FEVER: 0
EYE PAIN: 0
TROUBLE SWALLOWING: 0
FACIAL SWELLING: 0
CHILLS: 0

## 2023-11-28 ASSESSMENT — PAIN - FUNCTIONAL ASSESSMENT: PAIN_FUNCTIONAL_ASSESSMENT: 0-10

## 2023-11-28 NOTE — PROGRESS NOTES
Hai Laboy is a 78 y.o. male on day 4 of admission presenting with Acute respiratory failure due to COVID-19 (CMS/HCC).      Subjective   Hai Laboy is a 78 y.o. male with COPD, hypertension, hyperlipidemia, type 2 diabetes mellitus, prostate cancer, and previous stroke who presented 11/23/23 with shortness of breath. CXR with pneumonia. Started on BiPAP then weaned to NC. Lactate negative. CRP 2.56. D-dimer 1224. Bl cx negative x4 days    11/28/23: No acute events overnight. Vitals stable, % on 2L. Labs largely unremarkable.  Start dc planning to SNF. Completed remdesivir.          Review of Systems   Constitutional:  Negative for appetite change, chills, diaphoresis, fatigue and fever.   HENT:  Negative for congestion, ear pain, facial swelling, hearing loss, nosebleeds, sore throat, tinnitus and trouble swallowing.    Eyes:  Negative for pain.   Respiratory:  Positive for shortness of breath. Negative for cough, chest tightness and wheezing.    Cardiovascular:  Negative for chest pain, palpitations and leg swelling.   Gastrointestinal:  Negative for abdominal pain, blood in stool, constipation, diarrhea, nausea and vomiting.   Genitourinary:  Negative for dysuria, flank pain, frequency, hematuria and urgency.   Musculoskeletal:  Negative for back pain and joint swelling.   Skin:  Negative for rash and wound.   Neurological:  Negative for dizziness, syncope, weakness, light-headedness, numbness and headaches.   Hematological:  Does not bruise/bleed easily.   Psychiatric/Behavioral:  Negative for behavioral problems, hallucinations and suicidal ideas.           Objective     Last Recorded Vitals  /75 (BP Location: Right arm, Patient Position: Lying)   Pulse 72   Temp 36.2 °C (97.1 °F) (Temporal)   Resp 16   Wt 57.2 kg (126 lb 1.7 oz)   SpO2 100%     Image Results  XR chest 1 view    Result Date: 11/23/2023  Interpreted By:  Alvin Johnston, STUDY: XR CHEST 1 VIEW;  11/23/2023 11:06 pm    INDICATION: Signs/Symptoms:SOB.   COMPARISON: 11/12/2023   ACCESSION NUMBER(S): [id number]   ORDERING CLINICIAN: KATIE COOK   FINDINGS: The cardiac silhouette is normal in size. Atherosclerotic calcification of the thoracic aorta. There is pulmonary emphysema. There stable irregular opacities compatible with calcified pleural plaques. There is basilar atelectasis. No significant pleural effusion. No pneumothorax.       Pulmonary emphysema and stable bilateral irregular dense opacities compatible with calcified pleural plaques.   MACRO: None   Signed by: Alvin Johnston 11/23/2023 11:36 PM Dictation workstation:   [id number]    CT head wo IV contrast    Result Date: 11/12/2023  Interpreted By:  Sabrina Ramirez, STUDY: CT HEAD WO IV CONTRAST;  11/12/2023 6:01 pm   INDICATION: Signs/Symptoms:confusion.   COMPARISON: 10/16/2023   ACCESSION NUMBER(S): [id number]   ORDERING CLINICIAN: CASSIDY MEREDITH   TECHNIQUE: Axial noncontrast CT images of the head.   FINDINGS: BRAIN PARENCHYMA: Extensive parenchymal volume loss, nonspecific white matter changes and calcifications of the carotid arteries. No mass effect or midline shift. Focus of encephalomalacia in the right cerebellar hemisphere and extending from the left middle cerebellar peduncle into the left cerebellar hemisphere, similar to prior imaging. Punctate calcifications within the right sulci similar to prior imaging.   HEMORRHAGE: No acute intracranial hemorrhage. VENTRICLES and EXTRA-AXIAL SPACES: Margin of the lateral and 3rd ventricles are again noted which may be seen with central white matter loss or normal pressure hydrocephalus.. Ex vacuo dilatation of the 4th ventricle likely related to prior infarct. EXTRACRANIAL SOFT TISSUES: Within normal limits. PARANASAL SINUSES/MASTOIDS: The visualized paranasal sinuses and mastoid air cells are aerated. CALVARIUM: No depressed skull fracture. No destructive osseous lesion.   OTHER FINDINGS: None.       No acute  intracranial hemorrhage or mass effect.   Chronic white matter changes and sequela of remote posterior fossa infarcts again noted.   Ventricular prominence again noted.   MACRO: None.   Signed by: Sabrina Ramirez 11/12/2023 6:33 PM Dictation workstation:   [id number]    XR chest 1 view    Result Date: 11/12/2023  Interpreted By:  Bhavesh Barros, STUDY: XR CHEST 1 VIEW;  11/12/2023 4:33 pm   INDICATION: Signs/Symptoms:cough, sob.   COMPARISON: 11/07/2023.   ACCESSION NUMBER(S): [id number]   ORDERING CLINICIAN: CASSIDY MEREDITH   FINDINGS: No consolidation. No pleural effusion or pneumothorax. Hyperinflated lungs suggestive of COPD. Right-sided pleural calcifications unchanged. Left pleural calcification also present. Asymmetric elevation of the left hemidiaphragm. Atherosclerosis. Normal heart size. No acute osseous abnormality.       No acute cardiopulmonary abnormality.   Signed by: Bhavesh Barros 11/12/2023 4:45 PM Dictation workstation:   [id number]    CT angio chest for pulmonary embolism    Result Date: 11/7/2023  Interpreted By:  Ramana Ortega, STUDY: CT ANGIO CHEST FOR PULMONARY EMBOLISM; 11/7/2023 1:10 pm   INDICATION: Signs/Symptoms:SOB.   COMPARISON: CT chest dated 10/16/2023.   ACCESSION NUMBER(S): [id number]   ORDERING CLINICIAN: TRISHA LANDON   TECHNIQUE: Contiguous axial CT images were obtained through the chest at 2 mm slice thickness following administration of intravenous contrast utilizing pulmonary artery bolus tracking. 50 cc of Omnipaque 350 was administered. The images were then reconstructed in the coronal and sagittal planes. MIP images were created and reviewed.   FINDINGS: POTENTIAL LIMITATIONS OF THE STUDY: None   HEART and VESSELS: There is dense opacification of the pulmonary arterial system. No intraluminal filling defect is seen within the pulmonary arteries to suggest acute pulmonary embolus.   The heart is within normal limits for size. No pericardial effusion is  identified. Dense atherosclerotic calcifications are seen in the coronary arteries. Mild aortic valve calcifications are present.   Moderate atherosclerotic calcifications are seen in the aortic arch and descending thoracic aorta , including at the origins of the arch vessels. The thoracic aorta is within normal limits for course and caliber, without evidence of aneurysm.   MEDIASTINUM and SHANTA, LOWER NECK AND AXILLA: Evaluation of the visualized neck base demonstrates no gross mass or lymphadenopathy.   There is no gross axillary, hilar or mediastinal lymphadenopathy identified.   LUNGS and AIRWAYS: The trachea and central airways are grossly patent without evidence of endobronchial lesion.   Mild-to-moderate emphysematous changes are seen in the lungs bilaterally. Focal airspace consolidation is seen in the posteromedial aspect of the left mid lung, similar to the prior study, and may represent scarring/chronic atelectasis. Dense pleural calcifications are seen in the right lung. No discrete pulmonary nodules or masses are seen, though evaluation is limited within the regions of airspace consolidation..   UPPER ABDOMEN: Evaluation of the visualized upper abdomen demonstrates no discrete mass or lymphadenopathy.   CHEST WALL and OSSEOUS STRUCTURES: There is no evidence of acute fracture identified. Multilevel degenerative changes are seen in the thoracic spine.       1. No evidence of acute pulmonary embolus. 2. Left lower lobe airspace consolidation, most consistent with scarring/chronic atelectasis. 3. Emphysematous changes in the lungs bilaterally. 4. Extensive pleural calcifications in the right chest.   MACRO: None.     Signed by: Ramana Ortega 11/7/2023 1:30 PM Dictation workstation:   [id number]    XR chest 2 views    Result Date: 11/7/2023  Interpreted By:  Aretha Skelton, STUDY: XR CHEST 2 VIEWS;  11/7/2023 1:11 pm   INDICATION: Signs/Symptoms:cough.   COMPARISON: 10/16/2023   ACCESSION NUMBER(S):  [id number]   ORDERING CLINICIAN: TRISHA LANDON   FINDINGS: Prominent calcified pleural plaques are seen. Emphysematous changes are noted. Elevation of the left hemidiaphragm is again seen, which is unchanged. The heart is not enlarged. No consolidation, pleural effusion pneumothorax is noted.       Chronic lung changes.     MACRO: None   Signed by: Aretha Skelton 11/7/2023 1:16 PM Dictation workstation:   [id number]       Lab Results  Results for orders placed or performed during the hospital encounter of 11/23/23 (from the past 24 hour(s))   POCT GLUCOSE   Result Value Ref Range    POCT Glucose 136 (H) 74 - 99 mg/dL   POCT GLUCOSE   Result Value Ref Range    POCT Glucose 132 (H) 74 - 99 mg/dL   CBC and Auto Differential   Result Value Ref Range    WBC 10.5 4.4 - 11.3 x10*3/uL    nRBC 0.0 0.0 - 0.0 /100 WBCs    RBC 3.83 (L) 4.50 - 5.90 x10*6/uL    Hemoglobin 11.9 (L) 13.5 - 17.5 g/dL    Hematocrit 34.5 (L) 41.0 - 52.0 %    MCV 90 80 - 100 fL    MCH 31.1 26.0 - 34.0 pg    MCHC 34.5 32.0 - 36.0 g/dL    RDW 14.3 11.5 - 14.5 %    Platelets 162 150 - 450 x10*3/uL    Neutrophils % 86.8 40.0 - 80.0 %    Immature Granulocytes %, Automated 0.9 0.0 - 0.9 %    Lymphocytes % 6.1 13.0 - 44.0 %    Monocytes % 6.1 2.0 - 10.0 %    Eosinophils % 0.0 0.0 - 6.0 %    Basophils % 0.1 0.0 - 2.0 %    Neutrophils Absolute 9.14 (H) 1.60 - 5.50 x10*3/uL    Immature Granulocytes Absolute, Automated 0.10 0.00 - 0.50 x10*3/uL    Lymphocytes Absolute 0.64 (L) 0.80 - 3.00 x10*3/uL    Monocytes Absolute 0.64 0.05 - 0.80 x10*3/uL    Eosinophils Absolute 0.00 0.00 - 0.40 x10*3/uL    Basophils Absolute 0.01 0.00 - 0.10 x10*3/uL   Basic Metabolic Panel   Result Value Ref Range    Glucose 119 (H) 74 - 99 mg/dL    Sodium 138 136 - 145 mmol/L    Potassium 5.1 3.5 - 5.3 mmol/L    Chloride 103 98 - 107 mmol/L    Bicarbonate 29 21 - 32 mmol/L    Anion Gap 11 10 - 20 mmol/L    Urea Nitrogen 25 (H) 6 - 23 mg/dL    Creatinine 0.87 0.50 - 1.30 mg/dL     eGFR 87 >60 mL/min/1.73m*2    Calcium 8.1 (L) 8.6 - 10.3 mg/dL   POCT GLUCOSE   Result Value Ref Range    POCT Glucose 88 74 - 99 mg/dL   POCT GLUCOSE   Result Value Ref Range    POCT Glucose 84 74 - 99 mg/dL        Medications  Scheduled medications:  dexAMETHasone, 6 mg, oral, Daily   Or  dexAMETHasone, 6 mg, oral, Daily   Or  dexAMETHasone, 6 mg, intravenous, Daily  heparin (porcine), 5,000 Units, subcutaneous, q8h  insulin regular, 0-15 Units, subcutaneous, TID with meals  lisinopril, 20 mg, oral, Daily  piperacillin-tazobactam, 3.375 g, intravenous, q6h  polyethylene glycol, 17 g, oral, Daily  sodium chloride 0.9%, , ,       Continuous medications:     PRN medications:  PRN medications: dextrose 10 % in water (D10W), dextrose, glucagon, oxygen, oxygen, sodium chloride 0.9%     Physical Exam  Constitutional:       General: He is not in acute distress.     Appearance: Normal appearance.      Comments: Cachexia   HENT:      Head: Normocephalic and atraumatic.      Right Ear: External ear normal.      Left Ear: External ear normal.      Nose: Nose normal.      Mouth/Throat:      Mouth: Mucous membranes are moist.      Pharynx: Oropharynx is clear.   Eyes:      Extraocular Movements: Extraocular movements intact.      Conjunctiva/sclera: Conjunctivae normal.      Pupils: Pupils are equal, round, and reactive to light.   Cardiovascular:      Rate and Rhythm: Normal rate and regular rhythm.      Pulses: Normal pulses.      Heart sounds: Normal heart sounds.   Pulmonary:      Effort: Pulmonary effort is normal. No respiratory distress.      Breath sounds: No wheezing, rhonchi or rales.      Comments: NC in place  Abdominal:      General: Abdomen is flat. Bowel sounds are normal.      Palpations: Abdomen is soft.      Tenderness: There is no abdominal tenderness. There is no right CVA tenderness, left CVA tenderness, guarding or rebound.      Comments: Scaphoid   Musculoskeletal:         General: No swelling. Normal  range of motion.      Cervical back: Normal range of motion and neck supple.   Skin:     General: Skin is warm and dry.      Capillary Refill: Capillary refill takes less than 2 seconds.      Findings: No lesion or rash.   Neurological:      General: No focal deficit present.      Mental Status: He is alert. Mental status is at baseline. He is disoriented.      Comments: Unknown year or month   Psychiatric:         Mood and Affect: Mood normal.         Behavior: Behavior normal.                  Code Status  Full Code     Assessment/Plan      Acute exacerbation of chronic obstructive pulmonary disease (COPD) (CMS/[facility])  Assessment & Plan  Steroids for COPD/COVID    HTN (hypertension)  Assessment & Plan  Continue home medications    Acute metabolic encephalopathy  Assessment & Plan  Appears to be back to his baseline from most recent hospitalization    Acute pneumonia  Assessment & Plan  Zosyn  Appreciate ID recommendations    Hyperlipidemia  Assessment & Plan  Continue home medications    * Acute respiratory failure due to COVID-19 (CMS/[facility])  Assessment & Plan  Supplemental oxygen  Wean as tolerated  Continue remdesivir  Continue Decadron  Follow cultures  ID recommendations appreciated           Malnutrition          I agree with the dietitian's malnutrition diagnosis.    DVT ppx: subcutaneous Heparin     Please see orders for more complete plan    Tressa Davila PA-C

## 2023-11-28 NOTE — CARE PLAN
The patient's goals for the shift include  none (patient shaking head no)    The clinical goals for the shift include pt will maintain a pulse ox of 90 or greater    Over the shift, the patient did not make progress toward the following goals. Barriers to progression include . Recommendations to address these barriers include .

## 2023-11-29 VITALS
BODY MASS INDEX: 19.79 KG/M2 | HEART RATE: 75 BPM | WEIGHT: 126.1 LBS | RESPIRATION RATE: 18 BRPM | DIASTOLIC BLOOD PRESSURE: 75 MMHG | HEIGHT: 67 IN | TEMPERATURE: 96.6 F | SYSTOLIC BLOOD PRESSURE: 125 MMHG | OXYGEN SATURATION: 94 %

## 2023-11-29 LAB
GLUCOSE BLD MANUAL STRIP-MCNC: 120 MG/DL (ref 74–99)
GLUCOSE BLD MANUAL STRIP-MCNC: 122 MG/DL (ref 74–99)
GLUCOSE BLD MANUAL STRIP-MCNC: 159 MG/DL (ref 74–99)
STAPHYLOCOCCUS SPEC CULT: NORMAL

## 2023-11-29 PROCEDURE — 2500000001 HC RX 250 WO HCPCS SELF ADMINISTERED DRUGS (ALT 637 FOR MEDICARE OP): Performed by: PHYSICIAN ASSISTANT

## 2023-11-29 PROCEDURE — 97110 THERAPEUTIC EXERCISES: CPT | Mod: GP,CQ

## 2023-11-29 PROCEDURE — 97535 SELF CARE MNGMENT TRAINING: CPT | Mod: GO,CO

## 2023-11-29 PROCEDURE — 82947 ASSAY GLUCOSE BLOOD QUANT: CPT

## 2023-11-29 PROCEDURE — 94660 CPAP INITIATION&MGMT: CPT

## 2023-11-29 PROCEDURE — 3E0333Z INTRODUCTION OF ANTI-INFLAMMATORY INTO PERIPHERAL VEIN, PERCUTANEOUS APPROACH: ICD-10-PCS | Performed by: INTERNAL MEDICINE

## 2023-11-29 PROCEDURE — 96372 THER/PROPH/DIAG INJ SC/IM: CPT | Performed by: INTERNAL MEDICINE

## 2023-11-29 PROCEDURE — 97530 THERAPEUTIC ACTIVITIES: CPT | Mod: GO,CO

## 2023-11-29 PROCEDURE — 2500000004 HC RX 250 GENERAL PHARMACY W/ HCPCS (ALT 636 FOR OP/ED): Performed by: INTERNAL MEDICINE

## 2023-11-29 PROCEDURE — 99239 HOSP IP/OBS DSCHRG MGMT >30: CPT | Performed by: PHYSICIAN ASSISTANT

## 2023-11-29 PROCEDURE — 97530 THERAPEUTIC ACTIVITIES: CPT | Mod: GP,CQ

## 2023-11-29 RX ORDER — AMOXICILLIN AND CLAVULANATE POTASSIUM 875; 125 MG/1; MG/1
1 TABLET, FILM COATED ORAL 2 TIMES DAILY
Qty: 6 TABLET | Refills: 0
Start: 2023-11-29 | End: 2023-12-02

## 2023-11-29 RX ORDER — DEXAMETHASONE 6 MG/1
6 TABLET ORAL DAILY
Qty: 4 TABLET | Refills: 0
Start: 2023-11-30 | End: 2023-12-04

## 2023-11-29 RX ADMIN — HEPARIN SODIUM 5000 UNITS: 5000 INJECTION INTRAVENOUS; SUBCUTANEOUS at 11:28

## 2023-11-29 RX ADMIN — DEXAMETHASONE 6 MG: 6 TABLET ORAL at 09:35

## 2023-11-29 RX ADMIN — PIPERACILLIN SODIUM AND TAZOBACTAM SODIUM 3.38 G: 3; .375 INJECTION, SOLUTION INTRAVENOUS at 11:28

## 2023-11-29 RX ADMIN — POLYETHYLENE GLYCOL 3350 17 G: 17 POWDER, FOR SOLUTION ORAL at 09:35

## 2023-11-29 RX ADMIN — PIPERACILLIN SODIUM AND TAZOBACTAM SODIUM 3.38 G: 3; .375 INJECTION, SOLUTION INTRAVENOUS at 06:28

## 2023-11-29 RX ADMIN — LISINOPRIL 20 MG: 20 TABLET ORAL at 09:35

## 2023-11-29 RX ADMIN — HEPARIN SODIUM 5000 UNITS: 5000 INJECTION INTRAVENOUS; SUBCUTANEOUS at 06:28

## 2023-11-29 RX ADMIN — PIPERACILLIN SODIUM AND TAZOBACTAM SODIUM 3.38 G: 3; .375 INJECTION, SOLUTION INTRAVENOUS at 00:07

## 2023-11-29 ASSESSMENT — COGNITIVE AND FUNCTIONAL STATUS - GENERAL
MOBILITY SCORE: 13
EATING MEALS: A LITTLE
DRESSING REGULAR UPPER BODY CLOTHING: A LOT
STANDING UP FROM CHAIR USING ARMS: A LOT
CLIMB 3 TO 5 STEPS WITH RAILING: A LOT
PERSONAL GROOMING: A LOT
HELP NEEDED FOR BATHING: A LOT
MOVING TO AND FROM BED TO CHAIR: A LOT
DAILY ACTIVITIY SCORE: 12
MOVING FROM LYING ON BACK TO SITTING ON SIDE OF FLAT BED WITH BEDRAILS: A LOT
DRESSING REGULAR LOWER BODY CLOTHING: TOTAL
TOILETING: A LOT
TURNING FROM BACK TO SIDE WHILE IN FLAT BAD: A LOT
MOBILITY SCORE: 12
CLIMB 3 TO 5 STEPS WITH RAILING: TOTAL
DRESSING REGULAR UPPER BODY CLOTHING: A LITTLE
HELP NEEDED FOR BATHING: A LOT
WALKING IN HOSPITAL ROOM: A LOT
DRESSING REGULAR LOWER BODY CLOTHING: A LITTLE
EATING MEALS: A LITTLE
TURNING FROM BACK TO SIDE WHILE IN FLAT BAD: A LITTLE
MOVING TO AND FROM BED TO CHAIR: A LOT
DAILY ACTIVITIY SCORE: 16
PERSONAL GROOMING: A LITTLE
TOILETING: A LOT
STANDING UP FROM CHAIR USING ARMS: A LOT
MOVING FROM LYING ON BACK TO SITTING ON SIDE OF FLAT BED WITH BEDRAILS: A LITTLE
WALKING IN HOSPITAL ROOM: A LOT

## 2023-11-29 ASSESSMENT — PAIN - FUNCTIONAL ASSESSMENT
PAIN_FUNCTIONAL_ASSESSMENT: WONG-BAKER FACES
PAIN_FUNCTIONAL_ASSESSMENT: 0-10

## 2023-11-29 ASSESSMENT — PAIN SCALES - GENERAL
PAINLEVEL_OUTOF10: 0 - NO PAIN
PAINLEVEL_OUTOF10: 0 - NO PAIN

## 2023-11-29 ASSESSMENT — ACTIVITIES OF DAILY LIVING (ADL)
HOME_MANAGEMENT_TIME_ENTRY: 30
BATHING_LEVEL_OF_ASSISTANCE: MAXIMUM ASSISTANCE;MAXIMUM VERBAL CUES;TACTILE CUES
BATHING_WHERE_ASSESSED: BED LEVEL

## 2023-11-29 ASSESSMENT — PAIN SCALES - WONG BAKER: WONGBAKER_NUMERICALRESPONSE: NO HURT

## 2023-11-29 NOTE — PROGRESS NOTES
Patient is cleared for discharge today. Per nursing, patient is disoriented x 2. Call to pt's POA Aaron Sky and advised of discharge today. IMM delivered verbally and a copy sent Certified Mail to POA.

## 2023-11-29 NOTE — NURSING NOTE
Order received for discharge to [facility]. Iv d/cd. Report called to staff at the [facility]. PAS here to transport pt.

## 2023-11-29 NOTE — PROGRESS NOTES
Occupational Therapy    OT Treatment    Patient Name: Hai Laboy  MRN: [id number]  Today's Date: 11/29/2023  Time Calculation  Start Time: 0937  Stop Time: 1028  Time Calculation (min): 51 min         Assessment:  Evaluation/Treatment Tolerance: Patient limited by fatigue  Medical Staff Made Aware: Yes  End of Session Communication: Bedside nurse  End of Session Patient Position: Bed, 3 rail up  OT Assessment Results: Decreased cognition, Decreased endurance, Decreased upper extremity strength, Decreased safe judgment during ADL, Decreased ADL status, Decreased functional mobility  Evaluation/Treatment Tolerance: Patient limited by fatigue  Medical Staff Made Aware: Yes    Plan:  Treatment Interventions: ADL retraining, Functional transfer training, Endurance training, Patient/family training, Cognitive reorientation  Treatment Interventions: ADL retraining, Functional transfer training, Endurance training, Patient/family training, Cognitive reorientation  Subjective Pt did answer yes several times    Current Problem:  Patient Active Problem List   Diagnosis    Atherosclerosis of both carotid arteries    BPH (benign prostatic hyperplasia)    Cerebrovascular accident (CVA) (CMS/HCC)    COPD (chronic obstructive pulmonary disease) (CMS/HCC)    Hyperlipidemia    Lung nodule seen on imaging study    New onset type 2 diabetes mellitus (CMS/HCC)    Prostate cancer (CMS/HCC)    Urinary urgency    Routine general medical examination at health care facility    Acute pneumonia    Acute hypoxic respiratory failure (CMS/HCC)    Altered mental status    Encephalopathy    Cerebral ventriculomegaly    Sepsis (CMS/HCC)    Occlusion of right vertebral artery    Acute exacerbation of chronic obstructive pulmonary disease (COPD) (CMS/HCC)    Acute metabolic encephalopathy    History of stroke    HTN (hypertension)    Acute respiratory failure due to COVID-19 (CMS/HCC)       General:  OT Received On: 11/29/23       Pain:  Pain  Assessment  Pain Assessment: Black-Baker FACES  Pain Score: 0 - No pain  Black-Baker FACES Pain Rating: No hurt  Objective      Activities of Daily Living:          LE Bathing  LE Bathing Level of Assistance: Maximum assistance, Maximum verbal cues, Tactile cues  LE Bathing Where Assessed: Bed level  UE Dressing  UE Dressing Level of Assistance: Moderate assistance  UE Dressing Where Assessed: Bed level  LE Dressing  LE Dressing: Yes  Adult Briefs Level of Assistance: Dependent, Maximum assistance, Moderate verbal cues  LE Dressing Where Assessed: Bed level       Functional Standing Tolerance:  Time: static standing x 4 trial approx 2-3 min each  Activity: BUE support  Functional Standing Tolerance Comments: Pt freq with retrolean and pushing backwards    Bed Mobility/Transfers: Bed Mobility  Bed Mobility: Yes  Bed Mobility 1  Bed Mobility 1: Supine to sitting, Sitting to supine  Level of Assistance 1: Moderate assistance, Moderate verbal cues  Bed Mobility Comments 1: A X2           Therapy/Activity:  Static standing and static sitting EOB.           Strength:         Outcome Measures:[facility] Daily Activity  Putting on and taking off regular lower body clothing: Total  Bathing (including washing, rinsing, drying): A lot  Putting on and taking off regular upper body clothing: A lot  Toileting, which includes using toilet, bedpan or urinal: A lot  Taking care of personal grooming such as brushing teeth: A lot  Eating Meals: A little  Daily Activity - Total Score: 12  Education Documentation  Body Mechanics, taught by JOSE Douglass at 11/29/2023 10:59 AM.  Learner: Patient  Readiness: Acceptance  Method: Explanation, Demonstration  Response: Verbalizes Understanding, Needs Reinforcement    Precautions, taught by JOSE Douglass at 11/29/2023 10:59 AM.  Learner: Patient  Readiness: Acceptance  Method: Explanation, Demonstration  Response: Verbalizes Understanding, Needs Reinforcement    ADL Training,  taught by JOSE Douglass at 11/29/2023 10:59 AM.  Learner: Patient  Readiness: Acceptance  Method: Explanation, Demonstration  Response: Verbalizes Understanding, Needs Reinforcement    Mobility Training, taught by JOSE Douglass at 11/29/2023 10:59 AM.  Learner: Patient  Readiness: Acceptance  Method: Explanation, Demonstration  Response: Verbalizes Understanding, Needs Reinforcement    Education Comments  No comments found.        EDUCATION:  Education  Individual(s) Educated: Patient  Education Provided: Fall precautons, Risk and benefits of OT discussed with patient or other, POC discussed and agreed upon  Risk and Benefits Discussed with Patient/Caregiver/Other: yes  Patient/Caregiver Demonstrated Understanding: yes  Plan of Care Discussed and Agreed Upon: yes  Patient Response to Education:  (pt non-verbal however nod's head for understanding)    Goals:     Encounter Problems (Active)       ADLs       Patient with complete upper body dressing with minimal assist  level of assistance donning and doffing all UE clothes with no adaptive equipment while edge of bed  (Not Progressing)       Start:  11/27/23    Expected End:  12/11/23       Simulated UB ADL, MAX A d/t poor BUE endurance         Patient will complete daily grooming tasks brushing teeth, washing face/hair, and unsupported sitting with stand by assist level of assistance and PRN adaptive equipment while edge of bed . (Not Progressing)       Start:  11/27/23    Expected End:  12/11/23                       TRANSFERS       Patient will complete sit to stand transfer with minimal assist  level of assistance and front wheeled walker in order to improve safety and prepare for out of bed mobility. (Not Progressing)       Start:  11/27/23    Expected End:  12/11/23       MOD A x2 for STS

## 2023-11-29 NOTE — DISCHARGE SUMMARY
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            Hospital Course   Hai Laboy is a 78 y.o. male with COPD, hypertension, hyperlipidemia, type 2 diabetes mellitus, prostate cancer, and previous stroke who presented 11/23/23 with shortness of breath. CXR with pneumonia. Started on BiPAP then weaned to NC. Lactate negative. CRP 2.56. D-dimer 1224. Bl cx negative x3 days. Patient weaned to RA. He feels well, discharged to SNF.     Consultations: Infectious Disease consulted- treatment options were discussed and plan of care agreed upon.    Pertinent Physical Exam At Time of Discharge  Constitutional:       General: He is not in acute distress.     Appearance: Normal appearance.      Comments: Cachexia   HENT:      Head: Normocephalic and atraumatic.      Right Ear: External ear normal.      Left Ear: External ear normal.      Nose: Nose normal.      Mouth/Throat:      Mouth: Mucous membranes are moist.      Pharynx: Oropharynx is clear.   Eyes:      Extraocular Movements: Extraocular movements intact.      Conjunctiva/sclera: Conjunctivae normal.      Pupils: Pupils are equal, round, and reactive to light.   Cardiovascular:      Rate and Rhythm: Normal rate and regular rhythm.      Pulses: Normal pulses.      Heart sounds: Normal heart sounds.   Pulmonary:      Effort: Pulmonary effort is normal. No respiratory distress.      Breath sounds: No wheezing, rhonchi or rales.      Comments: NC in place  Abdominal:      General: Abdomen is flat. Bowel sounds are normal.      Palpations: Abdomen is soft.      Tenderness: There is no  abdominal tenderness. There is no right CVA tenderness, left CVA tenderness, guarding or rebound.      Comments: Scaphoid   Musculoskeletal:         General: No swelling. Normal range of motion.      Cervical back: Normal range of motion and neck supple.   Skin:     General: Skin is warm and dry.      Capillary Refill: Capillary refill takes less than 2 seconds.      Findings: No lesion or rash.   Neurological:      General: No focal deficit present.      Mental Status: He is alert. Mental status is at baseline. He is disoriented.      Comments: Unknown year or month   Psychiatric:         Mood and Affect: Mood normal.         Behavior: Behavior normal.     Code Status  Full Code     Home Medications     Medication List      START taking these medications     amoxicillin-pot clavulanate 875-125 mg tablet; Commonly known as:   Augmentin; Take 1 tablet (875 mg) by mouth 2 times a day for 3 days.   dexAMETHasone 6 mg tablet; Commonly known as: Decadron; Take 1 tablet (6   mg) by mouth once daily for 4 doses. Do not start before November 30, 2023.; Start taking on: November 30, 2023     CONTINUE taking these medications     * albuterol 90 mcg/actuation inhaler   * albuterol 2.5 mg /3 mL (0.083 %) nebulizer solution   aspirin 81 mg EC tablet   atorvastatin 10 mg tablet; Commonly known as: Lipitor   clopidogrel 75 mg tablet; Commonly known as: Plavix; TAKE 1 TABLET BY   MOUTH ONCE DAILY.   Gemtesa 75 mg tablet; Generic drug: vibegron; Take 1 tablet (75 mg) by   mouth once daily.   guaiFENesin 1,200 mg tablet extended release 12hr; Commonly known as:   Mucinex; Take 1 tablet (1,200 mg) by mouth 2 times a day for 14 days. Do   not crush, chew, or split.   lisinopril 40 mg tablet   multivitamin tablet   polyethylene glycol 17 gram packet; Commonly known as: Glycolax,   Miralax; Take 17 g by mouth once daily. Do not start before October 21, 2023.   Trelegy Ellipta 100-62.5-25 mcg blister with device; Generic drug:    fluticasone-umeclidin-vilanter  * This list has 2 medication(s) that are the same as other medications   prescribed for you. Read the directions carefully, and ask your doctor or   other care provider to review them with you.     STOP taking these medications     doxycycline 100 mg capsule; Commonly known as: Vibramycin   predniSONE 10 mg tablet; Commonly known as: Deltasone       Outpatient Follow-Up  Future Appointments   Date Time Provider Department Center   12/8/2023 11:00 AM Damian Belle MD [id number] [facility]         At the time of discharge, patient's pain was controlled with oral analgesia, patient was urinating, having BMs, sleeping, and eating well. Follow up recommendations are in discharge paperwork. Discharge plan was discussed with the patient/family and all of the questions were answered. Medications were ordered to be delivered to bedside prior to discharge.     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

## 2023-11-29 NOTE — PROGRESS NOTES
Hai Laboy is a 78 y.o. male on day 5 of admission presenting with Acute respiratory failure due to COVID-19 (CMS/HCC).    Subjective   Interval History:  no new complaints    Review of Systems  As above    Objective   Range of Vitals (last 24 hours)  Heart Rate:  [66-96]   Temp:  [36.2 °C (97.1 °F)-36.4 °C (97.6 °F)]   Resp:  [16-31]   BP: (112-143)/(62-76)   SpO2:  [91 %-97 %]   Daily Weight  11/25/23 : 57.2 kg (126 lb 1.7 oz)    Body mass index is 19.75 kg/m².    Physical Exam  General: AAO*3, frail   Skin: no rashes  Neck: supple  CVS: S1S2  Chest:CTAB  GI: soft, non tender  : no CVAT  Psych: alert,oriented  CNS: no FND      Antibiotics  methylPREDNISolone sod succinate (PF) (SOLU-Medrol) injection 125 mg  ipratropium-albuteroL (Duo-Neb) 0.5-2.5 mg/3 mL nebulizer solution 6 mL  dexAMETHasone (Decadron) injection 6 mg  sodium chloride 0.9 % bolus 1,000 mL  remdesivir (Veklury) 200 mg in sodium chloride 0.9% 250 mL IV  remdesivir (Veklury) 100 mg in sodium chloride 0.9% 250 mL IV  dexAMETHasone (Decadron) injection 6 mg  heparin (porcine) injection 5,000 Units  dexAMETHasone (Decadron) tablet 6 mg  dexAMETHasone oral liquid 6 mg  dexAMETHasone (Decadron) injection 6 mg  polyethylene glycol (Glycolax, Miralax) packet 17 g  remdesivir (Veklury) 200 mg in sodium chloride 0.9% 250 mL IV  remdesivir (Veklury) 100 mg in sodium chloride 0.9% 250 mL IV  remdesivir (Veklury) 200 mg in sodium chloride 0.9% 250 mL IV  remdesivir (Veklury) 100 mg in sodium chloride 0.9% 250 mL IV  piperacillin-tazobactam-dextrose (Zosyn) IV 4.5 g  piperacillin-tazobactam-dextrose (Zosyn) IV 3.375 g  oxygen (O2) therapy  dextrose 50 % injection 25 g  glucagon (Glucagen) injection 1 mg  dextrose 10 % in water (D10W) infusion  insulin regular (HumuLIN R) injection 0-15 Units  oxygen (O2) therapy  glycopyrrolate (Robinul) 200 mcg/mL injection 0.2 mg  lactated Ringer's bolus 1,000 mL  sodium chloride 0.9 % bolus 1,000 mL  sodium chloride 0.9%  flush  - Omnicell Override Pull  sodium chloride 0.9% flush  - Omnicell Override Pull  lisinopril tablet 20 mg  dexAMETHasone (Decadron) tablet  amoxicillin-clavulanate (Augmentin) 875-125 mg tablet, 1 BID for 10 days, #20, Ref 0      Relevant Results  Labs  Results from last 72 hours   Lab Units 11/28/23  0344 11/27/23  0401   WBC AUTO x10*3/uL 10.5 10.0   HEMOGLOBIN g/dL 11.9* 10.4*   HEMATOCRIT % 34.5* 30.6*   PLATELETS AUTO x10*3/uL 162 150   NEUTROS PCT AUTO % 86.8 91.0   LYMPHS PCT AUTO % 6.1 4.8   MONOS PCT AUTO % 6.1 3.5   EOS PCT AUTO % 0.0 0.0     Results from last 72 hours   Lab Units 11/28/23  0344 11/27/23  0401   SODIUM mmol/L 138 136   POTASSIUM mmol/L 5.1 4.9   CHLORIDE mmol/L 103 105   CO2 mmol/L 29 27   BUN mg/dL 25* 27*   CREATININE mg/dL 0.87 0.86   GLUCOSE mg/dL 119* 155*   CALCIUM mg/dL 8.1* 7.7*   ANION GAP mmol/L 11 9*   EGFR mL/min/1.73m*2 87 88         Estimated Creatinine Clearance: 54.8 mL/min (by C-G formula based on SCr of 0.87 mg/dL).  C-Reactive Protein   Date Value Ref Range Status   11/27/2023 2.56 (H) <1.00 mg/dL Final   10/17/2023 6.86 (H) <1.00 mg/dL Final          Assessment/Plan   COVID 19  pneumonia  COPD  HTN  HLD  DM  Stroke  Prostate cancer     Suggest:    Blood cx ngtd  Chest x ray reviewed. Patient is on room air  S/p remdesivir   steroids   S/p zosyn   Trend wbc and temps       Ulises Silver MD

## 2023-11-29 NOTE — PROGRESS NOTES
Physical Therapy    Physical Therapy Treatment    Patient Name: Hai Laboy  MRN: [id number]  Today's Date: 11/29/2023  Time Calculation  Start Time: 0938  Stop Time: 1028  Time Calculation (min): 50 min       Assessment/Plan   PT Assessment  End of Session Communication: Bedside nurse, PCT/NA/CTA  Assessment Comment: pt tolerated sitting up in chair for 30 min  End of Session Patient Position: Bed, 3 rail up, Alarm on     PT Plan  Treatment/Interventions: Bed mobility, Transfer training, Gait training, Balance training, Strengthening, Endurance training  PT Plan: Skilled PT  PT Frequency: 3 times per week  PT Discharge Recommendations: Moderate intensity level of continued care  Equipment Recommended upon Discharge:  (TBD)  PT Recommended Transfer Status: Assist x2 (FWW)  PT - OK to Discharge: Yes (TO NEXT LOC WHEN MEDICALLY CLEARED)      General Visit Information:   PT  Visit  PT Received On: 11/29/23  General  Prior to Session Communication: Bedside nurse  Patient Position Received: Bed, 3 rail up, Alarm on    Subjective     Objective   Pain:  Pain Assessment  Pain Assessment: 0-10  Pain Score: 0 - No pain  Cognition:  Cognition  Orientation Level: Disoriented to situation, Disoriented to time    Treatments:  Therapeutic Exercise  Therapeutic Exercise Performed: Yes  Therapeutic Exercise Activity 1: pt completed seated LE exercises: LAQ x 10 reps, hip flexion x 10 reps, hip adduction x 10 reps    Balance/Neuromuscular Re-Education  Balance/Neuromuscular Re-Education Activity Performed: Yes  Balance/Neuromuscular Re-Education Activity 1: pt sat EOB for 10 min with CGA/Min to maintain; pt compelted 4 static stands for 2-3 min each with Mod Ax1 to maintain    Bed Mobility  Bed Mobility: Yes  Bed Mobility 1  Bed Mobility 1: Supine to sitting, Sitting to supine  Level of Assistance 1: Moderate assistance, Moderate verbal cues  Bed Mobility Comments 1: Ax2  Bed Mobility 2  Bed Mobility  2: Rolling right, Rolling  left  Level of Assistance 2: Moderate assistance, Moderate verbal cues  Bed Mobility Comments 2: Ax2     Transfers  Transfer: Yes  Transfer 1  Technique 1: Sit to stand  Transfer Device 1:  (arm in arm)  Transfer Level of Assistance 1: Moderate assistance, +2, Maximum verbal cues  Transfers 2  Transfer to 2:  (bed<->chair)  Technique 2: Stand pivot  Transfer Device 2:  (arm in arm)  Transfer Level of Assistance 2: Moderate assistance, +2, Maximum verbal cues  Trials/Comments 2: pt cued to stand tall and to increase step length. pt atempts to impulsively sit prior to being safely infront of chair/EOB    Outcome Measures:  [facility] Basic Mobility  Turning from your back to your side while in a flat bed without using bedrails: A lot  Moving from lying on your back to sitting on the side of a flat bed without using bedrails: A lot  Moving to and from bed to chair (including a wheelchair): A lot  Standing up from a chair using your arms (e.g. wheelchair or bedside chair): A lot  To walk in hospital room: A lot  Climbing 3-5 steps with railing: A lot  Basic Mobility - Total Score: 12    Education Documentation  Mobility Training, taught by Ashleigh Cervantes PTA at 11/29/2023 10:46 AM.  Learner: Patient  Readiness: Acceptance  Method: Explanation  Response: Needs Reinforcement    Education Comments  No comments found.        OP EDUCATION:       Encounter Problems       Encounter Problems (Active)       Mobility       STG - Patient will ambulate (Progressing)       Start:  11/27/23    Expected End:  12/13/23       MIN X1 A FWW, 50-75 FT         STRENGTHENING (Progressing)       Start:  11/27/23    Expected End:  12/18/23       20+ REPS RROM INCREASINGSSTRENGTH TO STABILIZE OOB ACTIVITIES            Pain - Adult          Transfers       STG - Patient will perform bed mobility (Progressing)       Start:  11/27/23    Expected End:  12/12/23       MIN A USING RAILS         STG - Patient will transfer sit to and from stand  (Progressing)       Start:  11/27/23    Expected End:  12/13/23       FWW MIN A USING PROPER SAFETY TECHNIQUES

## 2024-11-11 NOTE — PROGRESS NOTES
11/27/23 1515   Discharge Planning   Living Arrangements Alone   Support Systems Friends/neighbors   Home or Post Acute Services Post acute facilities (Rehab/SNF/etc)   Type of Post Acute Facility Services Skilled nursing     Pt admitted from [facility] where he was receiving rehab.  I met with pt who was minimally interactive but was nodding y/n to questions.  He did confirm to return to [facility] on DC.  ZOË Do given referral to send to facility.  TCC to continue to follow to manage discharge planning and monitor for discharge needs.    In an effort to ensure that our patients [employer], a Team Member has reviewed your chart and identified an opportunity to provide the best care possible. An attempt was made to discuss or schedule due or overdue Preventive or Chronic Condition care.Care Gaps identified: Wellness Visits.    The Outcome was Contact was not made, letter/portal message sent.  We are attempting to schedule a Well child. If you have any questions or need help with scheduling, contact your primary care provider..   Type of Appointment needed: Primary Care Visit